# Patient Record
Sex: FEMALE | Race: WHITE | Employment: OTHER | URBAN - METROPOLITAN AREA
[De-identification: names, ages, dates, MRNs, and addresses within clinical notes are randomized per-mention and may not be internally consistent; named-entity substitution may affect disease eponyms.]

---

## 2017-01-27 ENCOUNTER — HOSPITAL ENCOUNTER (OUTPATIENT)
Dept: WOMENS IMAGING | Age: 82
Discharge: HOME | End: 2017-01-27
Attending: FAMILY MEDICINE | Admitting: FAMILY MEDICINE

## 2017-01-27 DIAGNOSIS — Z12.31 VISIT FOR SCREENING MAMMOGRAM: ICD-10-CM

## 2018-01-30 ENCOUNTER — HOSPITAL ENCOUNTER (OUTPATIENT)
Dept: WOMENS IMAGING | Age: 83
Discharge: HOME | End: 2018-01-30
Attending: FAMILY MEDICINE | Admitting: FAMILY MEDICINE

## 2018-01-30 DIAGNOSIS — Z12.31 VISIT FOR SCREENING MAMMOGRAM: ICD-10-CM

## 2018-02-02 ENCOUNTER — HOSPITAL ENCOUNTER (OUTPATIENT)
Dept: WOMENS IMAGING | Age: 83
Discharge: HOME | End: 2018-02-02
Attending: FAMILY MEDICINE | Admitting: FAMILY MEDICINE

## 2018-02-02 DIAGNOSIS — R92.8 ABNORMAL MAMMOGRAM: ICD-10-CM

## 2018-02-02 DIAGNOSIS — R92.8 OTHER ABNORMAL AND INCONCLUSIVE FINDINGS ON DIAGNOSTIC IMAGING OF BREAST: ICD-10-CM

## 2018-02-02 NOTE — PLAN OF CARE
Dr. Helen العلي spoke to patient regarding recommendation for breast biopsy. RN and patient discussed medical history, allergies, and current medication list. Biopsy procedure explained to patient and printed education and instructions were provided as well. Patient denies any further questions at this time. Doctor's office was called and report was faxed. Requesting order for biopsy from MD at this time.

## 2018-02-09 ENCOUNTER — HOSPITAL ENCOUNTER (OUTPATIENT)
Dept: WOMENS IMAGING | Age: 83
Discharge: HOME | End: 2018-02-09
Attending: FAMILY MEDICINE | Admitting: FAMILY MEDICINE

## 2018-02-09 VITALS
DIASTOLIC BLOOD PRESSURE: 53 MMHG | OXYGEN SATURATION: 96 % | SYSTOLIC BLOOD PRESSURE: 122 MMHG | RESPIRATION RATE: 18 BRPM | HEART RATE: 81 BPM

## 2018-02-09 DIAGNOSIS — R92.8 ABNORMAL MAMMOGRAM: ICD-10-CM

## 2018-02-09 DIAGNOSIS — R92.8 OTHER ABNORMAL AND INCONCLUSIVE FINDINGS ON DIAGNOSTIC IMAGING OF BREAST: ICD-10-CM

## 2018-02-13 ENCOUNTER — TELEPHONE (OUTPATIENT)
Dept: WOMENS IMAGING | Age: 83
End: 2018-02-13

## 2018-02-13 NOTE — TELEPHONE ENCOUNTER
Spoke to [place] to give her the breast biopsy results. An appt has been set up for her to see Dr. Bella Magana tomorrow, 2/14 at 10:30. Her  will go with her. She states her breast is a little tender and bruised but otherwise she feels fine. Dr. Bernstein Ree aware of path results and will make his addendum when he returns. Will send results to PCP now.

## 2018-02-14 ENCOUNTER — OFFICE VISIT (OUTPATIENT)
Dept: SURGERY | Age: 83
End: 2018-02-14

## 2018-02-14 VITALS — DIASTOLIC BLOOD PRESSURE: 83 MMHG | SYSTOLIC BLOOD PRESSURE: 137 MMHG | HEART RATE: 104 BPM

## 2018-02-14 DIAGNOSIS — Z17.0 MALIGNANT NEOPLASM OF UPPER-OUTER QUADRANT OF LEFT BREAST IN FEMALE, ESTROGEN RECEPTOR POSITIVE (HCC): Primary | ICD-10-CM

## 2018-02-14 DIAGNOSIS — C50.412 MALIGNANT NEOPLASM OF UPPER-OUTER QUADRANT OF LEFT BREAST IN FEMALE, ESTROGEN RECEPTOR POSITIVE (HCC): Primary | ICD-10-CM

## 2018-02-14 PROCEDURE — 4040F PNEUMOC VAC/ADMIN/RCVD: CPT | Performed by: SURGERY

## 2018-02-14 PROCEDURE — G8419 CALC BMI OUT NRM PARAM NOF/U: HCPCS | Performed by: SURGERY

## 2018-02-14 PROCEDURE — 1036F TOBACCO NON-USER: CPT | Performed by: SURGERY

## 2018-02-14 PROCEDURE — 1123F ACP DISCUSS/DSCN MKR DOCD: CPT | Performed by: SURGERY

## 2018-02-14 PROCEDURE — G8400 PT W/DXA NO RESULTS DOC: HCPCS | Performed by: SURGERY

## 2018-02-14 PROCEDURE — 1090F PRES/ABSN URINE INCON ASSESS: CPT | Performed by: SURGERY

## 2018-02-14 PROCEDURE — 99204 OFFICE O/P NEW MOD 45 MIN: CPT | Performed by: SURGERY

## 2018-02-14 PROCEDURE — G8427 DOCREV CUR MEDS BY ELIG CLIN: HCPCS | Performed by: SURGERY

## 2018-02-14 PROCEDURE — G8484 FLU IMMUNIZE NO ADMIN: HCPCS | Performed by: SURGERY

## 2018-02-14 RX ORDER — VENLAFAXINE HYDROCHLORIDE 150 MG/1
150 CAPSULE, EXTENDED RELEASE ORAL EVERY EVENING
COMMUNITY

## 2018-02-14 RX ORDER — CYANOCOBALAMIN (VITAMIN B-12) 1000 MCG
1 TABLET, EXTENDED RELEASE ORAL 2 TIMES DAILY WITH MEALS
COMMUNITY
End: 2021-11-02

## 2018-02-14 RX ORDER — CLONAZEPAM 0.5 MG/1
0.5 TABLET ORAL 2 TIMES DAILY
COMMUNITY

## 2018-02-14 RX ORDER — MULTIVIT WITH MINERALS/LUTEIN
250 TABLET ORAL 3 TIMES DAILY
COMMUNITY
End: 2021-11-02

## 2018-02-14 RX ORDER — ALENDRONATE SODIUM 70 MG/1
70 TABLET ORAL
COMMUNITY

## 2018-02-14 NOTE — PATIENT INSTRUCTIONS
Testing results were reviewed with patient and  today. Exam done today in office was unremarkable. Surgical options were reviewed in office (mastectomy vs lumpectomy)  Will need consultation with oncology (Dr Ruiz Snyder) to discuss further treatment (radiation). Risks and benefits of surgery were discussed  Outpatient procedure with general anesthesia. Will need to schedule H&P with PCP for surgical clearance.   Discuss options and return to office with decision

## 2018-02-14 NOTE — PROGRESS NOTES
regular rhythm, normal S1, S2, no murmurs, rubs, clicks or gallops  Abdomen: soft, non-tender, non-distended, no masses or organomegaly  Extremities: bilateral upper extremities without edema      Imaging: all imaging pertinent to this visit/exam was reviewed      Assessment:  1. Malignant neoplasm of upper-outer quadrant of left breast in female, estrogen receptor positive ([address]         Plan:  1. Discussed breast biopsy results with patient. Reviewed surgical options, including lumpectomy, mastectomy, and mastectomy with reconstruction. Also reviewed the sentinal node procedure. Recommendations were made following standard NCCN guidelines. She wishes to think about her options and I will see her back next week for further discussion.

## 2018-02-21 ENCOUNTER — OFFICE VISIT (OUTPATIENT)
Dept: SURGERY | Age: 83
End: 2018-02-21

## 2018-02-21 VITALS — SYSTOLIC BLOOD PRESSURE: 132 MMHG | HEART RATE: 96 BPM | DIASTOLIC BLOOD PRESSURE: 78 MMHG

## 2018-02-21 DIAGNOSIS — C50.412 MALIGNANT NEOPLASM OF UPPER-OUTER QUADRANT OF LEFT BREAST IN FEMALE, ESTROGEN RECEPTOR POSITIVE (HCC): Primary | ICD-10-CM

## 2018-02-21 DIAGNOSIS — Z17.0 MALIGNANT NEOPLASM OF UPPER-OUTER QUADRANT OF LEFT BREAST IN FEMALE, ESTROGEN RECEPTOR POSITIVE (HCC): Primary | ICD-10-CM

## 2018-02-21 PROCEDURE — 4040F PNEUMOC VAC/ADMIN/RCVD: CPT | Performed by: SURGERY

## 2018-02-21 PROCEDURE — G8484 FLU IMMUNIZE NO ADMIN: HCPCS | Performed by: SURGERY

## 2018-02-21 PROCEDURE — 99213 OFFICE O/P EST LOW 20 MIN: CPT | Performed by: SURGERY

## 2018-02-21 PROCEDURE — 1123F ACP DISCUSS/DSCN MKR DOCD: CPT | Performed by: SURGERY

## 2018-02-21 PROCEDURE — 1036F TOBACCO NON-USER: CPT | Performed by: SURGERY

## 2018-02-21 PROCEDURE — 1090F PRES/ABSN URINE INCON ASSESS: CPT | Performed by: SURGERY

## 2018-02-21 PROCEDURE — G8419 CALC BMI OUT NRM PARAM NOF/U: HCPCS | Performed by: SURGERY

## 2018-02-21 PROCEDURE — G8400 PT W/DXA NO RESULTS DOC: HCPCS | Performed by: SURGERY

## 2018-02-21 PROCEDURE — G8427 DOCREV CUR MEDS BY ELIG CLIN: HCPCS | Performed by: SURGERY

## 2018-02-21 NOTE — LETTER
CONSENT TO OPERATION  AND/OR OTHER PROCEDURE(S)          PATIENT : Leanne Bajwa   YOB: 1935      DATE : 2/21/18          1. I request and consent that Dr. Nikhil Nichole,  and/or his associates or assistants perform an operation and/or procedures on the above patient at  [facility] [place], to treat the condition(s) which appear indicated by the diagnostic studies already performed. I have been told that in general terms the nature, purpose and reasonable expectations of the operation and/or procedure(s) are:     Left Breast Needle Localized Lumpectomy X 2 with Left Axillary [place] Node Biopsy with Radio Isotope with Gamma Probe       2. It has been explained to me by the informing physician that during the course of the operation and/or procedure(s) unforeseen conditions may be revealed that necessitate an extension of the original operation and/or procedure(s) or different operation and/or procedures than those set forth in Paragraph 1. I therefore authorize and request that my physician and/or his associates or assistants perform such operations and/or procedures as are necessary and desirable in the exercise of professional judgment. The authority granted under this Paragraph 2 shall extend to all conditions that require treatment and are known to my physician at the time the operation is commenced. 3. I have been made aware by the informing physician of certain risks and consequences that are associated with the operation and/or procedure(s) described in Paragraph 1, the reasonable alternative methods or treatment, the possible consequences, the possibility that the operation and/or procedure(s) may be unsuccessful and the possibility of complications. I understand the reasonably known risks to be:  ? Bleeding  ? Infection  ? Poor Healing  ? Possible Damage to Nerve, Vessel, Tendon/Muscle or Bone  ? Need for further Treatment/Surgery (re excision)  ? Stiffness  ?  Pain ? Residual or Recurrent Symptoms  ? Anesthetic and/or Medical Risks  ? 4. I have also been informed by the informing physician that there are other risks from both known and unknown causes that are attendant to the performance of any surgical procedure. I am aware that the practice of medicine and surgery is not an exact science, and that no guarantees have been made to me concerning the results of the operation and/or procedure(s). 5. I   CONSENT / REFUSE CONSENT  (strike the phrase that does not apply) to the taking of photographs before, during and/or after the operation or procedure for scientific/educational purposes. 6. I consent to the administration of anesthesia and to the use of such anesthetics as may be deemed advisable by the anesthesiologist who has been engaged by me or my physician. 7. I certify that I have read and understand the above consent to operation and/or other procedure(s); that the explanations therein referred to were made to me by the informing physician in advance of my signing this consent; that all blanks or statements requiring insertion or completion were filled in and inapplicable paragraphs, if any, were stricken before I signed; and that all questions asked by me about the operation and/or procedure(s) which I have consented to have been fully answered in a satisfactory manner.               _______________________  Witness        Signature Of Patient          _______________________  Informing Physician         Signature of Informing Physician           If patient is unable to sign or is a minor, complete one of the following:    (A)  Patient is a minor   years of age. (B)  Patient is unable to sign because: The undersigned represents that he or she is duly authorized to execute this consent for and on behalf of the above named patient.                Witness               o  Parent  o  Guardian   o  Spouse       o  Other (specify)

## 2018-02-21 NOTE — PROGRESS NOTES
Patient Name: Janna Hinojosa  YOB: 1935  Primary Care Physician: Martina Avelar MD    Chief Complaint:    Chief Complaint   Patient presents with   [facility] Surgical Consult     Patient is here today to discuss surgical details regarding mastectomy vs lumpectomy    Pt had a recent mammogram showing 2 areas of microcalcification in the upper outer left breast. The more lateral area is 1.1 cm, 2:00, 5 cm from nipple. The biopsy showed DCIS with microinvasion. 3.5 cm medial to this is a second area 3 mm, 2-3:00, 2.7 cm from nipple. This showed DCIS. Both ER/GA positive. She has not felt any masses. No breast pain or nipple discharge. No family history of breast cancer. No history of HRT. Here to discuss surgery.  and daughter present. Breast History:  History of Previous Breast Biopsy:no  Self Breast Exams Completed:no-  Family History of Breast Cancer:no-  Family History of Other Cancers:yes-mother rectal cancer age 80  [employer] Decent:no  Bra Size: 39 C    Gyne History:  : 2, Para: 2,   Age of Menarche: 17 years  Age of Menopause: 42 years   LMP: 42  Age of first pregnancy: 42 years  Age of first live Birth: 42 years  Breast Feeding (total time): no  History of Hysterectomy / JAMIL-BSO: no  History of OCP's/HRT:no  History of Ovarian Cancer: no  Family History of Ovarian Cancer: no  History of Gyne Surgery: yes-tubal ligation    Past Medical History:   Diagnosis Date    Asthma     GERD (gastroesophageal reflux disease)        Past Surgical History:   Procedure Laterality Date     SECTION            Current Outpatient Prescriptions   Medication Sig Dispense Refill    alendronate (FOSAMAX) 70 MG tablet Take 70 mg by mouth every 7 days      clonazePAM (KLONOPIN) 0.5 MG tablet Take 0.5 mg by mouth 2 times daily as needed.       RaNITidine HCl (RANITIDINE 75 PO) Take by mouth      venlafaxine (EFFEXOR XR) 150 MG extended release capsule Take 150 mg by mouth daily ([facility]         Plan:  1. She wishes to proceed with needle localized left breast lumpectomy x 2 for clinical stage 1 (TmiN0) ductal carcinoma with left axillary sentinel node biopsy. Risks, benefits, and alternatives were reviewed with the patient. Questions were answered, and she is agreeable to proceed. Patient was seen with total face to face time of 22 minutes. More than 50% of this visit was counseling and education regarding future surgery.

## 2018-03-08 ENCOUNTER — TELEPHONE (OUTPATIENT)
Dept: SURGERY | Age: 83
End: 2018-03-08

## 2018-03-13 ENCOUNTER — SURG/PROC ORDERS (OUTPATIENT)
Dept: ANESTHESIOLOGY | Age: 83
End: 2018-03-13

## 2018-03-13 ENCOUNTER — PAT TELEPHONE (OUTPATIENT)
Dept: PREADMISSION TESTING | Age: 83
End: 2018-03-13

## 2018-03-13 VITALS — BODY MASS INDEX: 29.88 KG/M2 | WEIGHT: 175 LBS | HEIGHT: 64 IN

## 2018-03-13 RX ORDER — SODIUM CHLORIDE 9 MG/ML
INJECTION, SOLUTION INTRAVENOUS CONTINUOUS
Status: CANCELLED | OUTPATIENT
Start: 2018-03-13

## 2018-03-13 RX ORDER — SODIUM CHLORIDE 0.9 % (FLUSH) 0.9 %
10 SYRINGE (ML) INJECTION EVERY 12 HOURS SCHEDULED
Status: CANCELLED | OUTPATIENT
Start: 2018-03-13

## 2018-03-13 RX ORDER — SODIUM CHLORIDE 0.9 % (FLUSH) 0.9 %
10 SYRINGE (ML) INJECTION PRN
Status: CANCELLED | OUTPATIENT
Start: 2018-03-13

## 2018-03-13 RX ORDER — BISMUTH SUBSALICYLATE 262 MG/1
262 TABLET, CHEWABLE ORAL 4 TIMES DAILY PRN
COMMUNITY
Start: 2018-01-21 | End: 2021-11-02

## 2018-03-13 RX ORDER — TRIAMTERENE AND HYDROCHLOROTHIAZIDE 75; 50 MG/1; MG/1
TABLET ORAL
COMMUNITY
Start: 2018-03-02 | End: 2021-11-02

## 2018-03-13 RX ORDER — ALBUTEROL SULFATE 90 UG/1
2 AEROSOL, METERED RESPIRATORY (INHALATION) EVERY 6 HOURS PRN
COMMUNITY

## 2018-03-13 NOTE — PRE-PROCEDURE INSTRUCTIONS
at the hospital until the child is discharged. Please do not bring other children with you. For your comfort, please wear simple loose fitting clothing to the hospital.  Please do not bring valuables. Do not wear any make-up or nail polish on your fingers or toes      For your safety, please do not wear any jewelry or body piercing's on the day of surgery. All jewelry must be removed. If you have dentures, they will be removed before going to operating room. For your convenience, we will provide you with a container. If you wear contact lenses or glasses, they will be removed, please bring a case for them. If you have a living will and a durable power of  for healthcare, please bring in a copy. As part of our patient safety program to minimize surgical site infections, we ask you to do the following:    · Please notify your surgeon if you develop any illness between         now and the  day of your surgery. · This includes a cough, cold, fever, sore throat, nausea,         or vomiting, and diarrhea, etc.  ·  Please notify your surgeon if you experience dizziness, shortness         of breath or blurred vision between now and the time of your surgery. Do not shave your operative site 96 hours prior to surgery. For face and neck surgery, men may use an electric razor 48 hours   prior to surgery. You may shower the night before surgery or the morning of   your surgery with an antibacterial soap. You will need to bring a photo ID and insurance card    [facility] has an onsite pharmacy, would you like to utilize our pharmacy     If you will be staying overnight and use a C-pap machine, please bring   your C-pap to hospital     Our goal is to provide you with excellent care, therefore, visitors will be limited to two(2) in the room at a time so that we may focus on providing this care for you.           Please contact pre-admission testing if you have any further

## 2018-03-15 ENCOUNTER — HOSPITAL ENCOUNTER (OUTPATIENT)
Dept: SURGERY | Age: 83
Discharge: HOME | End: 2018-03-15
Admitting: SURGERY

## 2018-03-15 VITALS
DIASTOLIC BLOOD PRESSURE: 78 MMHG | RESPIRATION RATE: 18 BRPM | HEART RATE: 85 BPM | SYSTOLIC BLOOD PRESSURE: 145 MMHG | WEIGHT: 175 LBS | BODY MASS INDEX: 29.88 KG/M2 | TEMPERATURE: 98.5 F | HEIGHT: 64 IN | OXYGEN SATURATION: 93 %

## 2018-03-15 DIAGNOSIS — R92.8 ABNORMAL MAMMOGRAM: ICD-10-CM

## 2018-03-15 DIAGNOSIS — C50.412 MALIGNANT NEOPLASM OF UPPER-OUTER QUADRANT OF LEFT BREAST IN FEMALE, ESTROGEN RECEPTOR POSITIVE (HCC): ICD-10-CM

## 2018-03-15 DIAGNOSIS — Z17.0 MALIGNANT NEOPLASM OF UPPER-OUTER QUADRANT OF LEFT BREAST IN FEMALE, ESTROGEN RECEPTOR POSITIVE (HCC): ICD-10-CM

## 2018-03-15 LAB
ANION GAP SERPL CALCULATED.3IONS-SCNC: 10 MMOL/L (ref 3–16)
BUN BLDV-MCNC: 30 MG/DL (ref 7–20)
CALCIUM SERPL-MCNC: 9.8 MG/DL (ref 8.3–10.6)
CHLORIDE BLD-SCNC: 104 MMOL/L (ref 99–110)
CO2: 28 MMOL/L (ref 21–32)
CREAT SERPL-MCNC: 0.9 MG/DL (ref 0.6–1.2)
GFR AFRICAN AMERICAN: >60
GFR NON-AFRICAN AMERICAN: >60
GLUCOSE BLD-MCNC: 98 MG/DL (ref 70–99)
HCT VFR BLD CALC: 38.7 % (ref 36–48)
HEMOGLOBIN: 12.8 G/DL (ref 12–16)
MCH RBC QN AUTO: 31.5 PG (ref 26–34)
MCHC RBC AUTO-ENTMCNC: 33.2 G/DL (ref 31–36)
MCV RBC AUTO: 94.7 FL (ref 80–100)
PDW BLD-RTO: 13.2 % (ref 12.4–15.4)
PLATELET # BLD: 204 K/UL (ref 135–450)
PMV BLD AUTO: 8.3 FL (ref 5–10.5)
POTASSIUM REFLEX MAGNESIUM: 4.3 MMOL/L (ref 3.5–5.1)
RBC # BLD: 4.08 M/UL (ref 4–5.2)
SODIUM BLD-SCNC: 142 MMOL/L (ref 136–145)
WBC # BLD: 6.7 K/UL (ref 4–11)

## 2018-03-15 PROCEDURE — 38525 BIOPSY/REMOVAL LYMPH NODES: CPT | Performed by: SURGERY

## 2018-03-15 PROCEDURE — 14001 TIS TRNFR TRUNK 10.1-30SQCM: CPT | Performed by: SURGERY

## 2018-03-15 PROCEDURE — 38900 IO MAP OF SENT LYMPH NODE: CPT | Performed by: SURGERY

## 2018-03-15 PROCEDURE — 19301 PARTIAL MASTECTOMY: CPT | Performed by: SURGERY

## 2018-03-15 RX ORDER — ONDANSETRON 2 MG/ML
4 INJECTION INTRAMUSCULAR; INTRAVENOUS
Status: ACTIVE | OUTPATIENT
Start: 2018-03-15 | End: 2018-03-15

## 2018-03-15 RX ORDER — OXYCODONE HYDROCHLORIDE AND ACETAMINOPHEN 5; 325 MG/1; MG/1
2 TABLET ORAL PRN
Status: ACTIVE | OUTPATIENT
Start: 2018-03-15 | End: 2018-03-15

## 2018-03-15 RX ORDER — SODIUM CHLORIDE 9 MG/ML
INJECTION, SOLUTION INTRAVENOUS CONTINUOUS
Status: DISCONTINUED | OUTPATIENT
Start: 2018-03-15 | End: 2018-03-16 | Stop reason: HOSPADM

## 2018-03-15 RX ORDER — MORPHINE SULFATE 2 MG/ML
2 INJECTION, SOLUTION INTRAMUSCULAR; INTRAVENOUS EVERY 5 MIN PRN
Status: DISCONTINUED | OUTPATIENT
Start: 2018-03-15 | End: 2018-03-16 | Stop reason: HOSPADM

## 2018-03-15 RX ORDER — FENTANYL CITRATE 50 UG/ML
50 INJECTION, SOLUTION INTRAMUSCULAR; INTRAVENOUS EVERY 5 MIN PRN
Status: DISCONTINUED | OUTPATIENT
Start: 2018-03-15 | End: 2018-03-16 | Stop reason: HOSPADM

## 2018-03-15 RX ORDER — MORPHINE SULFATE 2 MG/ML
1 INJECTION, SOLUTION INTRAMUSCULAR; INTRAVENOUS EVERY 5 MIN PRN
Status: DISCONTINUED | OUTPATIENT
Start: 2018-03-15 | End: 2018-03-16 | Stop reason: HOSPADM

## 2018-03-15 RX ORDER — ACETAMINOPHEN 325 MG/1
650 TABLET ORAL
Status: COMPLETED | OUTPATIENT
Start: 2018-03-15 | End: 2018-03-15

## 2018-03-15 RX ORDER — MEPERIDINE HYDROCHLORIDE 25 MG/ML
12.5 INJECTION INTRAMUSCULAR; INTRAVENOUS; SUBCUTANEOUS EVERY 5 MIN PRN
Status: DISCONTINUED | OUTPATIENT
Start: 2018-03-15 | End: 2018-03-16 | Stop reason: HOSPADM

## 2018-03-15 RX ORDER — OXYCODONE HYDROCHLORIDE AND ACETAMINOPHEN 5; 325 MG/1; MG/1
1 TABLET ORAL PRN
Status: ACTIVE | OUTPATIENT
Start: 2018-03-15 | End: 2018-03-15

## 2018-03-15 RX ORDER — HYDROCODONE BITARTRATE AND ACETAMINOPHEN 5; 325 MG/1; MG/1
1 TABLET ORAL EVERY 6 HOURS PRN
Qty: 8 TABLET | Refills: 0 | Status: SHIPPED | OUTPATIENT
Start: 2018-03-15 | End: 2018-03-22

## 2018-03-15 RX ORDER — SODIUM CHLORIDE 0.9 % (FLUSH) 0.9 %
10 SYRINGE (ML) INJECTION PRN
Status: DISCONTINUED | OUTPATIENT
Start: 2018-03-15 | End: 2018-03-16 | Stop reason: HOSPADM

## 2018-03-15 RX ORDER — SODIUM CHLORIDE 0.9 % (FLUSH) 0.9 %
10 SYRINGE (ML) INJECTION EVERY 12 HOURS SCHEDULED
Status: DISCONTINUED | OUTPATIENT
Start: 2018-03-15 | End: 2018-03-16 | Stop reason: HOSPADM

## 2018-03-15 RX ORDER — LIDOCAINE AND PRILOCAINE 25; 25 MG/G; MG/G
CREAM TOPICAL ONCE
Status: COMPLETED | OUTPATIENT
Start: 2018-03-15 | End: 2018-03-15

## 2018-03-15 RX ORDER — FENTANYL CITRATE 50 UG/ML
25 INJECTION, SOLUTION INTRAMUSCULAR; INTRAVENOUS EVERY 5 MIN PRN
Status: DISCONTINUED | OUTPATIENT
Start: 2018-03-15 | End: 2018-03-16 | Stop reason: HOSPADM

## 2018-03-15 RX ADMIN — LIDOCAINE AND PRILOCAINE: 25; 25 CREAM TOPICAL at 07:27

## 2018-03-15 RX ADMIN — ACETAMINOPHEN 650 MG: 325 TABLET ORAL at 09:26

## 2018-03-15 RX ADMIN — SODIUM CHLORIDE: 9 INJECTION, SOLUTION INTRAVENOUS at 09:25

## 2018-03-15 ASSESSMENT — LIFESTYLE VARIABLES: SMOKING_STATUS: 0

## 2018-03-15 ASSESSMENT — PAIN - FUNCTIONAL ASSESSMENT: PAIN_FUNCTIONAL_ASSESSMENT: 0-10

## 2018-03-15 ASSESSMENT — PAIN SCALES - GENERAL
PAINLEVEL_OUTOF10: 0
PAINLEVEL_OUTOF10: 4
PAINLEVEL_OUTOF10: 0

## 2018-03-15 ASSESSMENT — PAIN DESCRIPTION - ONSET: ONSET: ON-GOING

## 2018-03-15 ASSESSMENT — ENCOUNTER SYMPTOMS: SHORTNESS OF BREATH: 1

## 2018-03-15 ASSESSMENT — PAIN DESCRIPTION - PROGRESSION: CLINICAL_PROGRESSION: NOT CHANGED

## 2018-03-15 ASSESSMENT — PAIN DESCRIPTION - ORIENTATION: ORIENTATION: LEFT

## 2018-03-15 ASSESSMENT — PAIN DESCRIPTION - PAIN TYPE: TYPE: SURGICAL PAIN

## 2018-03-15 ASSESSMENT — PAIN DESCRIPTION - DESCRIPTORS: DESCRIPTORS: ACHING

## 2018-03-15 ASSESSMENT — PAIN DESCRIPTION - FREQUENCY: FREQUENCY: INTERMITTENT

## 2018-03-15 ASSESSMENT — PAIN DESCRIPTION - LOCATION: LOCATION: BREAST

## 2018-03-15 NOTE — ANESTHESIA POST-OP
[facility] Department of Anesthesiology  Post-Anesthesia Note       Name:  Ajit Alejandro                                         Age:  [de-identified] y.o.   MRN:  [id number]     Last Vitals & Oxygen Saturation: BP (!) 145/78   Pulse 85   Temp 98.5 °F (36.9 °C) (Temporal)   Resp 18   Ht 5' 4\" (1.626 m)   Wt 175 lb (79.4 kg)   SpO2 93%   BMI 30.04 kg/m²   Patient Vitals for the past 4 hrs:   BP Temp Temp src Pulse Resp SpO2   03/15/18 1300 (!) 145/78 - - 85 18 93 %   03/15/18 1225 135/67 - - 82 20 93 %   03/15/18 1217 (!) 140/73 - - 88 23 93 %   03/15/18 1202 137/71 - - 79 24 91 %   03/15/18 1158 - - - 81 - -   03/15/18 1152 130/72 - - 80 22 93 %   03/15/18 1148 - - - 77 20 93 %   03/15/18 1147 129/67 - - 78 21 95 %   03/15/18 1142 136/62 - - 75 20 98 %   03/15/18 1137 139/68 - - 76 21 99 %   03/15/18 1136 (!) 141/73 98.5 °F (36.9 °C) Temporal 77 20 99 %       Level of consciousness: awake, alert and oriented    Respiratory: stable     Cardiovascular: stable     Hydration: stable     PONV: stable     Post-op pain: adequate analgesia    Post-op assessment: no apparent anesthetic complications and tolerated procedure well    Complications:  none    Paulina Christiansen MD  March 15, 2018   1:21 PM

## 2018-03-15 NOTE — H&P
H&P Update    Patient's History and Physical from February 28, 2018 was reviewed. Patient examined. There has been no change.     Kaylan Whelan

## 2018-03-19 ENCOUNTER — TELEPHONE (OUTPATIENT)
Dept: SURGERY | Age: 83
End: 2018-03-19

## 2018-03-19 NOTE — TELEPHONE ENCOUNTER
Patient was given her pathology results and she scheduled post op visit for next week. She was given Dr Ad Fletcher name and phone number to schedule appointment.

## 2018-03-28 ENCOUNTER — OFFICE VISIT (OUTPATIENT)
Dept: SURGERY | Age: 83
End: 2018-03-28

## 2018-03-28 VITALS
WEIGHT: 175 LBS | DIASTOLIC BLOOD PRESSURE: 77 MMHG | BODY MASS INDEX: 30.04 KG/M2 | HEART RATE: 84 BPM | SYSTOLIC BLOOD PRESSURE: 135 MMHG

## 2018-03-28 DIAGNOSIS — C50.412 MALIGNANT NEOPLASM OF UPPER-OUTER QUADRANT OF LEFT BREAST IN FEMALE, ESTROGEN RECEPTOR POSITIVE (HCC): Primary | ICD-10-CM

## 2018-03-28 DIAGNOSIS — Z17.0 MALIGNANT NEOPLASM OF UPPER-OUTER QUADRANT OF LEFT BREAST IN FEMALE, ESTROGEN RECEPTOR POSITIVE (HCC): Primary | ICD-10-CM

## 2018-03-28 PROCEDURE — 99024 POSTOP FOLLOW-UP VISIT: CPT | Performed by: SURGERY

## 2018-03-28 NOTE — LETTER
[address] [facility] Vascular Surgery  [address]  Phone: [phone]  Fax: [phone]    Deisy Cochran MD        March 28, 2018     Sushil oRwe MD  [address] [facility] [address]  [address]    Patient: Tyrel Dekcer  MR Number: [id number]  YOB: 1935  Date of Visit: 3/28/2018    Dear Liana Shoemaker:    Freddie Baker returned today following her lumpectomy and sentinel node biopsy. 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 Microinvasion had been noted on prior bx. TisN0. ER/[place] positive. Wounds healing well. Some axillary discomfort. To see oncology tomorrow. f/u with me in 6 months with left breast diagnostic mammogram.      If you have questions, please do not hesitate to call me. I look forward to following Michelle Bruno along with you.     Sincerely,          Deisy Cochran MD

## 2018-04-11 ENCOUNTER — HOSPITAL ENCOUNTER (OUTPATIENT)
Dept: WOMENS IMAGING | Age: 83
Discharge: HOME | End: 2018-04-11
Attending: INTERNAL MEDICINE | Admitting: INTERNAL MEDICINE

## 2018-04-11 DIAGNOSIS — M81.0 AGE-RELATED OSTEOPOROSIS WITHOUT CURRENT PATHOLOGICAL FRACTURE: ICD-10-CM

## 2018-04-11 DIAGNOSIS — Z78.0 ASYMPTOMATIC AGE-RELATED POSTMENOPAUSAL STATE: ICD-10-CM

## 2018-08-14 ENCOUNTER — HOSPITAL ENCOUNTER (OUTPATIENT)
Dept: WOMENS IMAGING | Age: 83
Discharge: HOME | End: 2018-08-14
Attending: SURGERY | Admitting: SURGERY

## 2018-08-14 DIAGNOSIS — C50.412 MALIGNANT NEOPLASM OF UPPER-OUTER QUADRANT OF LEFT BREAST IN FEMALE, ESTROGEN RECEPTOR POSITIVE (HCC): ICD-10-CM

## 2018-08-14 DIAGNOSIS — Z17.0 MALIGNANT NEOPLASM OF UPPER-OUTER QUADRANT OF LEFT BREAST IN FEMALE, ESTROGEN RECEPTOR POSITIVE (HCC): ICD-10-CM

## 2018-08-14 DIAGNOSIS — C50.412 MALIGNANT NEOPLASM OF UPPER-OUTER QUADRANT OF LEFT FEMALE BREAST (HCC): ICD-10-CM

## 2018-08-22 ENCOUNTER — OFFICE VISIT (OUTPATIENT)
Dept: BREAST CENTER | Age: 83
End: 2018-08-22

## 2018-08-22 VITALS
BODY MASS INDEX: 30.04 KG/M2 | DIASTOLIC BLOOD PRESSURE: 65 MMHG | WEIGHT: 175 LBS | SYSTOLIC BLOOD PRESSURE: 148 MMHG | HEART RATE: 87 BPM

## 2018-08-22 DIAGNOSIS — Z17.0 MALIGNANT NEOPLASM OF UPPER-OUTER QUADRANT OF LEFT BREAST IN FEMALE, ESTROGEN RECEPTOR POSITIVE (HCC): Primary | ICD-10-CM

## 2018-08-22 DIAGNOSIS — C50.412 MALIGNANT NEOPLASM OF UPPER-OUTER QUADRANT OF LEFT BREAST IN FEMALE, ESTROGEN RECEPTOR POSITIVE (HCC): Primary | ICD-10-CM

## 2018-08-22 PROCEDURE — 1123F ACP DISCUSS/DSCN MKR DOCD: CPT | Performed by: SURGERY

## 2018-08-22 PROCEDURE — 1036F TOBACCO NON-USER: CPT | Performed by: SURGERY

## 2018-08-22 PROCEDURE — 1090F PRES/ABSN URINE INCON ASSESS: CPT | Performed by: SURGERY

## 2018-08-22 PROCEDURE — G8417 CALC BMI ABV UP PARAM F/U: HCPCS | Performed by: SURGERY

## 2018-08-22 PROCEDURE — G8427 DOCREV CUR MEDS BY ELIG CLIN: HCPCS | Performed by: SURGERY

## 2018-08-22 PROCEDURE — 99213 OFFICE O/P EST LOW 20 MIN: CPT | Performed by: SURGERY

## 2018-08-22 PROCEDURE — 4040F PNEUMOC VAC/ADMIN/RCVD: CPT | Performed by: SURGERY

## 2018-08-22 PROCEDURE — G8399 PT W/DXA RESULTS DOCUMENT: HCPCS | Performed by: SURGERY

## 2018-08-22 PROCEDURE — 1101F PT FALLS ASSESS-DOCD LE1/YR: CPT | Performed by: SURGERY

## 2018-08-22 RX ORDER — ANASTROZOLE 1 MG/1
1 TABLET ORAL EVERY EVENING
COMMUNITY

## 2018-08-22 ASSESSMENT — ENCOUNTER SYMPTOMS
BACK PAIN: 0
CONSTIPATION: 0
COLOR CHANGE: 0
DIARRHEA: 0
TROUBLE SWALLOWING: 0
NAUSEA: 0
SHORTNESS OF BREATH: 0
RECTAL PAIN: 0
BLOOD IN STOOL: 0
ABDOMINAL DISTENTION: 0
VOMITING: 0
ABDOMINAL PAIN: 0
COUGH: 0

## 2018-08-22 NOTE — PROGRESS NOTES
Subjective:      Patient ID: Stephenie Torres is a [de-identified] y.o. female. HPI   Chief Complaint   Patient presents with    6 Month Follow-Up     Patient presents 6 month follow up with mammogram       S/p left lumpectomy, snbx 3/2018. 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 Microinvasion had been noted on prior bx. TisN0. ER/[place] positive. Postop radiation. On Arimidex. Left mammogram 8/14/2018 showed post-therapy changes, probable benign. Recommend bilateral mammogram 2/2019. Review of Systems   Constitutional: Negative for activity change, appetite change, chills, fatigue, fever and unexpected weight change. HENT: Negative for trouble swallowing. Eyes: Negative for visual disturbance. Respiratory: Negative for cough and shortness of breath. Cardiovascular: Negative for chest pain and leg swelling. Gastrointestinal: Negative for abdominal distention, abdominal pain, blood in stool, constipation, diarrhea, nausea, rectal pain and vomiting. Genitourinary: Negative for difficulty urinating, dysuria, flank pain, frequency and hematuria. Musculoskeletal: Negative for arthralgias, back pain, neck pain and neck stiffness. Skin: Negative for color change, rash and wound. Neurological: Negative for dizziness, weakness, numbness and headaches. Hematological: Negative for adenopathy. Does not bruise/bleed easily. Psychiatric/Behavioral: Negative for confusion and sleep disturbance. All other systems reviewed and are negative. Objective:   Physical Exam   Constitutional: She is oriented to person, place, and time. She appears well-developed and well-nourished. No distress. HENT:   Head: Normocephalic and atraumatic. Neck: Normal range of motion. No tracheal deviation present. Cardiovascular: Normal rate. Pulmonary/Chest: Effort normal. No respiratory distress. She has no wheezes. Abdominal: Soft. She exhibits no distension. There is no hepatosplenomegaly.    Musculoskeletal:

## 2019-01-11 ENCOUNTER — APPOINTMENT (OUTPATIENT)
Dept: CT IMAGING | Age: 84
End: 2019-01-11
Payer: MEDICARE

## 2019-01-11 ENCOUNTER — APPOINTMENT (OUTPATIENT)
Dept: GENERAL RADIOLOGY | Age: 84
End: 2019-01-11
Payer: MEDICARE

## 2019-01-11 ENCOUNTER — HOSPITAL ENCOUNTER (EMERGENCY)
Age: 84
Discharge: HOME | End: 2019-01-11
Payer: MEDICARE

## 2019-01-11 VITALS
WEIGHT: 169.53 LBS | TEMPERATURE: 97.9 F | SYSTOLIC BLOOD PRESSURE: 133 MMHG | BODY MASS INDEX: 29.1 KG/M2 | HEART RATE: 73 BPM | OXYGEN SATURATION: 100 % | DIASTOLIC BLOOD PRESSURE: 79 MMHG | RESPIRATION RATE: 16 BRPM

## 2019-01-11 DIAGNOSIS — S70.01XA CONTUSION OF RIGHT HIP, INITIAL ENCOUNTER: ICD-10-CM

## 2019-01-11 DIAGNOSIS — S40.012A CONTUSION OF LEFT SHOULDER, INITIAL ENCOUNTER: ICD-10-CM
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
## 2019-01-11 DIAGNOSIS — S16.1XXA STRAIN OF NECK MUSCLE, INITIAL ENCOUNTER: ICD-10-CM

## 2019-01-11 PROCEDURE — 73502 X-RAY EXAM HIP UNI 2-3 VIEWS: CPT

## 2019-01-11 PROCEDURE — 70450 CT HEAD/BRAIN W/O DYE: CPT

## 2019-01-11 PROCEDURE — 73030 X-RAY EXAM OF SHOULDER: CPT

## 2019-01-11 PROCEDURE — 72125 CT NECK SPINE W/O DYE: CPT

## 2019-01-11 PROCEDURE — 6370000000 HC RX 637 (ALT 250 FOR IP): Performed by: PHYSICIAN ASSISTANT

## 2019-01-11 PROCEDURE — 99284 EMERGENCY DEPT VISIT MOD MDM: CPT

## 2019-01-11 RX ORDER — ACETAMINOPHEN 500 MG
1000 TABLET ORAL ONCE
Status: COMPLETED | OUTPATIENT
Start: 2019-01-11 | End: 2019-01-11

## 2019-01-11 RX ADMIN — ACETAMINOPHEN 1000 MG: 500 TABLET ORAL at 09:55

## 2019-01-11 ASSESSMENT — PAIN DESCRIPTION - PROGRESSION: CLINICAL_PROGRESSION: GRADUALLY WORSENING

## 2019-01-11 ASSESSMENT — ENCOUNTER SYMPTOMS
BACK PAIN: 0
SHORTNESS OF BREATH: 0
ABDOMINAL PAIN: 0
NAUSEA: 0

## 2019-01-11 ASSESSMENT — PAIN DESCRIPTION - DESCRIPTORS: DESCRIPTORS: ACHING

## 2019-01-11 ASSESSMENT — PAIN DESCRIPTION - ONSET: ONSET: GRADUAL

## 2019-01-11 ASSESSMENT — PAIN SCALES - GENERAL
PAINLEVEL_OUTOF10: 4
PAINLEVEL_OUTOF10: 4
PAINLEVEL_OUTOF10: 3

## 2019-01-11 ASSESSMENT — PAIN DESCRIPTION - LOCATION: LOCATION: SHOULDER;HIP

## 2019-01-11 ASSESSMENT — PAIN DESCRIPTION - FREQUENCY: FREQUENCY: CONTINUOUS

## 2019-02-25 ENCOUNTER — OFFICE VISIT (OUTPATIENT)
Dept: BREAST CENTER | Age: 84
End: 2019-02-25
Payer: MEDICARE

## 2019-02-25 ENCOUNTER — HOSPITAL ENCOUNTER (OUTPATIENT)
Dept: WOMENS IMAGING | Age: 84
Discharge: HOME | End: 2019-02-25
Payer: MEDICARE

## 2019-02-25 ENCOUNTER — HOSPITAL ENCOUNTER (OUTPATIENT)
Dept: ULTRASOUND IMAGING | Age: 84
Discharge: HOME | End: 2019-02-25
Payer: MEDICARE

## 2019-02-25 VITALS
DIASTOLIC BLOOD PRESSURE: 75 MMHG | BODY MASS INDEX: 30.04 KG/M2 | HEART RATE: 95 BPM | WEIGHT: 175 LBS | SYSTOLIC BLOOD PRESSURE: 153 MMHG

## 2019-02-25 DIAGNOSIS — Z17.0 MALIGNANT NEOPLASM OF UPPER-OUTER QUADRANT OF LEFT BREAST IN FEMALE, ESTROGEN RECEPTOR POSITIVE (HCC): ICD-10-CM

## 2019-02-25 DIAGNOSIS — Z17.0 MALIGNANT NEOPLASM OF UPPER-OUTER QUADRANT OF LEFT BREAST IN FEMALE, ESTROGEN RECEPTOR POSITIVE (HCC): Primary | ICD-10-CM

## 2019-02-25 DIAGNOSIS — C50.412 MALIGNANT NEOPLASM OF UPPER-OUTER QUADRANT OF LEFT BREAST IN FEMALE, ESTROGEN RECEPTOR POSITIVE (HCC): ICD-10-CM

## 2019-02-25 DIAGNOSIS — C50.412 MALIGNANT NEOPLASM OF UPPER-OUTER QUADRANT OF LEFT BREAST IN FEMALE, ESTROGEN RECEPTOR POSITIVE (HCC): Primary | ICD-10-CM

## 2019-02-25 DIAGNOSIS — N63.0 BREAST LUMP: ICD-10-CM

## 2019-02-25 DIAGNOSIS — Z90.12 STATUS POST PARTIAL MASTECTOMY OF LEFT BREAST: ICD-10-CM

## 2019-02-25 PROCEDURE — 76642 ULTRASOUND BREAST LIMITED: CPT

## 2019-02-25 PROCEDURE — 99213 OFFICE O/P EST LOW 20 MIN: CPT | Performed by: SURGERY

## 2019-02-25 PROCEDURE — G8399 PT W/DXA RESULTS DOCUMENT: HCPCS | Performed by: SURGERY

## 2019-02-25 PROCEDURE — G8427 DOCREV CUR MEDS BY ELIG CLIN: HCPCS | Performed by: SURGERY

## 2019-02-25 PROCEDURE — G0279 TOMOSYNTHESIS, MAMMO: HCPCS

## 2019-02-25 PROCEDURE — G8484 FLU IMMUNIZE NO ADMIN: HCPCS | Performed by: SURGERY

## 2019-02-25 PROCEDURE — 1036F TOBACCO NON-USER: CPT | Performed by: SURGERY

## 2019-02-25 PROCEDURE — G8417 CALC BMI ABV UP PARAM F/U: HCPCS | Performed by: SURGERY

## 2019-02-25 PROCEDURE — 1101F PT FALLS ASSESS-DOCD LE1/YR: CPT | Performed by: SURGERY

## 2019-02-25 PROCEDURE — 1090F PRES/ABSN URINE INCON ASSESS: CPT | Performed by: SURGERY

## 2019-02-25 PROCEDURE — 1123F ACP DISCUSS/DSCN MKR DOCD: CPT | Performed by: SURGERY

## 2019-02-25 PROCEDURE — 4040F PNEUMOC VAC/ADMIN/RCVD: CPT | Performed by: SURGERY

## 2020-03-27 ENCOUNTER — TELEPHONE (OUTPATIENT)
Dept: BREAST CENTER | Age: 85
End: 2020-03-27

## 2020-03-27 NOTE — TELEPHONE ENCOUNTER
Called patient left message on voice mail for patient to called back to reschedule with [place] only on Wednesday @ [facility] from 06/03/20 through 07/08/20

## 2020-05-19 ENCOUNTER — TELEPHONE (OUTPATIENT)
Dept: SURGERY | Age: 85
End: 2020-05-19

## 2020-05-19 NOTE — TELEPHONE ENCOUNTER
Left message on voice mail, rescheduled appointment from 07/01/20 to 07/08/20, 10:30 mammogram see practitioner at 11:30 due to Molly Pickards not in office on 07/01/20, Schedule has changed due to COVID-19.   At [facility]

## 2020-07-08 ENCOUNTER — OFFICE VISIT (OUTPATIENT)
Dept: BREAST CENTER | Age: 85
End: 2020-07-08
Payer: MEDICARE

## 2020-07-08 ENCOUNTER — HOSPITAL ENCOUNTER (OUTPATIENT)
Dept: WOMENS IMAGING | Age: 85
Discharge: HOME | End: 2020-07-08
Payer: MEDICARE

## 2020-07-08 VITALS — HEART RATE: 83 BPM | SYSTOLIC BLOOD PRESSURE: 128 MMHG | DIASTOLIC BLOOD PRESSURE: 70 MMHG

## 2020-07-08 PROCEDURE — G8399 PT W/DXA RESULTS DOCUMENT: HCPCS | Performed by: NURSE PRACTITIONER

## 2020-07-08 PROCEDURE — 1090F PRES/ABSN URINE INCON ASSESS: CPT | Performed by: NURSE PRACTITIONER

## 2020-07-08 PROCEDURE — 77067 SCR MAMMO BI INCL CAD: CPT

## 2020-07-08 PROCEDURE — 1123F ACP DISCUSS/DSCN MKR DOCD: CPT | Performed by: NURSE PRACTITIONER

## 2020-07-08 PROCEDURE — 99213 OFFICE O/P EST LOW 20 MIN: CPT | Performed by: NURSE PRACTITIONER

## 2020-07-08 PROCEDURE — 1036F TOBACCO NON-USER: CPT | Performed by: NURSE PRACTITIONER

## 2020-07-08 PROCEDURE — 4040F PNEUMOC VAC/ADMIN/RCVD: CPT | Performed by: NURSE PRACTITIONER

## 2020-07-08 PROCEDURE — G8421 BMI NOT CALCULATED: HCPCS | Performed by: NURSE PRACTITIONER

## 2020-07-08 PROCEDURE — G8427 DOCREV CUR MEDS BY ELIG CLIN: HCPCS | Performed by: NURSE PRACTITIONER

## 2020-07-08 NOTE — PROGRESS NOTES
Surgical Breast Oncology       CC: One year [id number] for breast check and mammogram     HPI: Karley Bar is a 82 y.o. woman here for annual follow up for breast check secondary to personal history of breast cancer. She is [facility]. 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 Microinvasion had been noted on prior bx. TisN0. ER/[place] positive. Postop radiation. On Arimidex and tolerating it well. She has no breast related concerns today. She states that she does not perform routine self breast evaluations and has not noticed any new abnormalities such as masses, skin changes, color changes,nipple discharge, or changes to the nipple-areolar complex. Mammogram today reveals no direct or indirect signs of malignancy. BI-RADS 2.           Past Medical History:   Diagnosis Date    Asthma     CKD (chronic kidney disease)     GERD (gastroesophageal reflux disease)     Neuropathy     Osteoporosis     Pernicious anemia     Scoliosis        Past Surgical History:   Procedure Laterality Date     SECTION       SECTION      x`s       Family History   Problem Relation Age of Onset    Cancer Mother     Heart Disease Father        Allergies as of 2020 - Review Complete 2020   Allergen Reaction Noted    Latex Hives and Itching 2015       Social History     Tobacco Use    Smoking status: Never Smoker    Smokeless tobacco: Never Used   Substance Use Topics    Alcohol use: No    Drug use: No       Current Outpatient Medications on File Prior to Visit   Medication Sig Dispense Refill    anastrozole (ARIMIDEX) 1 MG tablet Take 1 mg by mouth daily      albuterol sulfate  (90 Base) MCG/ACT inhaler Inhale 2 puffs into the lungs every 6 hours as needed for Wheezing      fluticasone-salmeterol (ADVAIR HFA) 115-21 MCG/ACT inhaler Inhale 2 puffs into the lungs 2 times daily      bismuth subsalicylate (PEPTO BISMOL) 262 MG chewable tablet 262 mg      triamterene-hydrochlorothiazide (MAXZIDE) 75-50 MG per tablet       Ergocalciferol (VITAMIN D2) 2000 units TABS Take 50,000 Units by mouth Twice weekly      alendronate (FOSAMAX) 70 MG tablet Take 70 mg by mouth every 7 days      clonazePAM (KLONOPIN) 0.5 MG tablet Take 0.5 mg by mouth 2 times daily as needed.  RaNITidine HCl (RANITIDINE 75 PO) Take by mouth      venlafaxine (EFFEXOR XR) 150 MG extended release capsule Take 150 mg by mouth daily      Ascorbic Acid (VITAMIN C) 250 MG tablet Take 250 mg by mouth daily      calcium citrate-vitamin D (CITRICAL + D) 315-250 MG-UNIT TABS per tablet Take 1 tablet by mouth 2 times daily (with meals)      omeprazole (PRILOSEC) 40 MG delayed release capsule Take 1 capsule by mouth daily 30 capsule 0    ALBUTEROL IN Inhale into the lungs       No current facility-administered medications on file prior to visit. Medications: documentation has been reviewed in the electronic medical record and patient office intake form. REVIEW OF SYSTEMS:  Constitutional: Negative for fever  HENT: Negative for sore throat  Eyes: Negative for redness   Respiratory: Negative for dyspnea, cough  Cardiovascular: Negative for chest pain  Gastrointestinal: Negative for vomiting, diarrhea   Genitourinary: Negative for hematuria   Musculoskeletal: Negative for arthralgias   Skin: Negative for rash  Neurological: Negative for syncope  Hematological: Negative for adenopathy  Psychiatric/Behavorial: Negative for anxiety         PHYSICAL EXAM:  /70 (Site: Left Upper Arm, Position: Sitting, Cuff Size: Medium Adult)   Pulse 83   Constitutional: She appearswell-nourished. No apparent distress. Breast: The patient was examined in the upright and supine position. Breasts are symmetrically ptotic. Right: No new masses or changes in breast contour. No skin changes of the breast or nipple areolar complex. No nipple inversion or discharge.  No erythema, thickening (peau d'orange), or dimpling. Left: well healed scar and expected post surgical changes. No new masses or changes in breast contour. No skin changes of the breast or nipple areolar complex. No nipple inversion or discharge. No erythema, thickening (peau d'orange), or dimpling. There is no axillary lymphadenopathy palpated bilaterally. Head: Normocephalic and atraumatic:   Eyes: EOM are normal. Pupils are equal, round, and reactive to light. Neck: Neck supple. No tracheal deviation present. No obvious mass. Cardiovascular: regular rate. Pulmonary: No accessory muscle use. Respirations non-labored and no wheezing. Lymphatics: No palpable supraclavicular, cervical, or axillary lymphadenopathy  Skin: No rash noted. No erythema. Neurologic: alert and oriented. Extremities: appear well perfused. No edema. No joint deformity             ASSESSMENT:  - Screening Breast Examination - stable breast examination and stable bilateral screening mammogram.    - Personal History of Breast Cancer - s/p left partial mastectomy, SLNB, [facility] 3/2018. 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 Microinvasion had been noted on prior bx. TisN0. ER/[place] positive. Postop radiation. On Arimidex  - Encounter for follow-up surveillance of breast cancer       PLAN:    Continue annual screening mammography with tomosynthesis   Continue annual clinical breast exams    Continue endocrine therapy per medical oncology    Signs/symptoms of recurrence were reviewed. She verbalizes understanding that she should notify the office if she identifies any abnormalities on self evaluation.    Follow up cancer surveillance discussed    Discussed the importance of breast awareness including the importance and technique of self breast exams   Today's imaging was reviewed and the results were discussed with the patient, all questions answered   Education provided for Healthy Lifestyle Recommendations: healthy diet, routine exercise, weight control, decreased alcohol consumption.  Follow up after annual mammogram in 1 year          LEXIS Astorga  [facility])   Surgical Breast Oncology   [phone]    All of the patient's questions were answered at this time however, she was encouraged to call the office with any further inquiries. Approximately 15 minutes of time were spent in this visit of which 50% or more of the time was related to coordination of care.

## 2020-07-31 ENCOUNTER — APPOINTMENT (OUTPATIENT)
Dept: GENERAL RADIOLOGY | Age: 85
DRG: 581 | End: 2020-07-31
Payer: MEDICARE

## 2020-07-31 ENCOUNTER — ANESTHESIA (OUTPATIENT)
Dept: OPERATING ROOM | Age: 85
DRG: 581 | End: 2020-07-31
Payer: MEDICARE

## 2020-07-31 ENCOUNTER — ANESTHESIA EVENT (OUTPATIENT)
Dept: OPERATING ROOM | Age: 85
DRG: 581 | End: 2020-07-31
Payer: MEDICARE

## 2020-07-31 ENCOUNTER — HOSPITAL ENCOUNTER (INPATIENT)
Age: 85
LOS: 3 days | Discharge: NURSING, REHAB OR LONG-TERM CARE | DRG: 581 | End: 2020-08-03
Attending: EMERGENCY MEDICINE | Admitting: INTERNAL MEDICINE
Payer: MEDICARE

## 2020-07-31 VITALS
DIASTOLIC BLOOD PRESSURE: 58 MMHG | SYSTOLIC BLOOD PRESSURE: 119 MMHG | TEMPERATURE: 96.8 F | RESPIRATION RATE: 2 BRPM | OXYGEN SATURATION: 100 %

## 2020-07-31 PROBLEM — S81.012A KNEE LACERATION, LEFT, INITIAL ENCOUNTER: Status: ACTIVE | Noted: 2020-07-31

## 2020-07-31 LAB
ANION GAP SERPL CALCULATED.3IONS-SCNC: 11 MMOL/L (ref 3–16)
APTT: 31.5 SEC (ref 24.2–36.2)
BASOPHILS ABSOLUTE: 0 K/UL (ref 0–0.2)
BASOPHILS RELATIVE PERCENT: 0.5 %
BUN BLDV-MCNC: 30 MG/DL (ref 7–20)
CALCIUM SERPL-MCNC: 9.8 MG/DL (ref 8.3–10.6)
CHLORIDE BLD-SCNC: 106 MMOL/L (ref 99–110)
CO2: 26 MMOL/L (ref 21–32)
CREAT SERPL-MCNC: 1 MG/DL (ref 0.6–1.2)
EOSINOPHILS ABSOLUTE: 0.2 K/UL (ref 0–0.6)
EOSINOPHILS RELATIVE PERCENT: 3.2 %
GFR AFRICAN AMERICAN: >60
GFR NON-AFRICAN AMERICAN: 53
GLUCOSE BLD-MCNC: 111 MG/DL (ref 70–99)
HCT VFR BLD CALC: 36.1 % (ref 36–48)
HEMOGLOBIN: 11.8 G/DL (ref 12–16)
INR BLD: 0.94 (ref 0.86–1.14)
LYMPHOCYTES ABSOLUTE: 1.3 K/UL (ref 1–5.1)
LYMPHOCYTES RELATIVE PERCENT: 21.9 %
MCH RBC QN AUTO: 31.4 PG (ref 26–34)
MCHC RBC AUTO-ENTMCNC: 32.8 G/DL (ref 31–36)
MCV RBC AUTO: 95.8 FL (ref 80–100)
MONOCYTES ABSOLUTE: 0.7 K/UL (ref 0–1.3)
MONOCYTES RELATIVE PERCENT: 11.7 %
NEUTROPHILS ABSOLUTE: 3.6 K/UL (ref 1.7–7.7)
NEUTROPHILS RELATIVE PERCENT: 62.7 %
PDW BLD-RTO: 13.7 % (ref 12.4–15.4)
PLATELET # BLD: 192 K/UL (ref 135–450)
PMV BLD AUTO: 8.2 FL (ref 5–10.5)
POTASSIUM REFLEX MAGNESIUM: 4 MMOL/L (ref 3.5–5.1)
PROTHROMBIN TIME: 10.9 SEC (ref 10–13.2)
RBC # BLD: 3.76 M/UL (ref 4–5.2)
SARS-COV-2, NAAT: NOT DETECTED
SODIUM BLD-SCNC: 143 MMOL/L (ref 136–145)
WBC # BLD: 5.7 K/UL (ref 4–11)

## 2020-07-31 PROCEDURE — 2700000000 HC OXYGEN THERAPY PER DAY

## 2020-07-31 PROCEDURE — 6360000002 HC RX W HCPCS: Performed by: NURSE ANESTHETIST, CERTIFIED REGISTERED

## 2020-07-31 PROCEDURE — U0002 COVID-19 LAB TEST NON-CDC: HCPCS

## 2020-07-31 PROCEDURE — 85025 COMPLETE CBC W/AUTO DIFF WBC: CPT

## 2020-07-31 PROCEDURE — 90715 TDAP VACCINE 7 YRS/> IM: CPT | Performed by: EMERGENCY MEDICINE

## 2020-07-31 PROCEDURE — 94760 N-INVAS EAR/PLS OXIMETRY 1: CPT

## 2020-07-31 PROCEDURE — 97530 THERAPEUTIC ACTIVITIES: CPT

## 2020-07-31 PROCEDURE — 7100000001 HC PACU RECOVERY - ADDTL 15 MIN: Performed by: ORTHOPAEDIC SURGERY

## 2020-07-31 PROCEDURE — 3700000000 HC ANESTHESIA ATTENDED CARE: Performed by: ORTHOPAEDIC SURGERY

## 2020-07-31 PROCEDURE — 2580000003 HC RX 258: Performed by: EMERGENCY MEDICINE

## 2020-07-31 PROCEDURE — 6360000002 HC RX W HCPCS: Performed by: ORTHOPAEDIC SURGERY

## 2020-07-31 PROCEDURE — 6370000000 HC RX 637 (ALT 250 FOR IP): Performed by: INTERNAL MEDICINE

## 2020-07-31 PROCEDURE — 99284 EMERGENCY DEPT VISIT MOD MDM: CPT

## 2020-07-31 PROCEDURE — 3600000002 HC SURGERY LEVEL 2 BASE: Performed by: ORTHOPAEDIC SURGERY

## 2020-07-31 PROCEDURE — 96365 THER/PROPH/DIAG IV INF INIT: CPT

## 2020-07-31 PROCEDURE — 99222 1ST HOSP IP/OBS MODERATE 55: CPT | Performed by: ORTHOPAEDIC SURGERY

## 2020-07-31 PROCEDURE — 20103 EXPL PENTRG WOUND EXTREMITY: CPT | Performed by: ORTHOPAEDIC SURGERY

## 2020-07-31 PROCEDURE — 90471 IMMUNIZATION ADMIN: CPT | Performed by: EMERGENCY MEDICINE

## 2020-07-31 PROCEDURE — 73560 X-RAY EXAM OF KNEE 1 OR 2: CPT

## 2020-07-31 PROCEDURE — 6360000002 HC RX W HCPCS: Performed by: EMERGENCY MEDICINE

## 2020-07-31 PROCEDURE — 97166 OT EVAL MOD COMPLEX 45 MIN: CPT

## 2020-07-31 PROCEDURE — 3700000001 HC ADD 15 MINUTES (ANESTHESIA): Performed by: ORTHOPAEDIC SURGERY

## 2020-07-31 PROCEDURE — 80048 BASIC METABOLIC PNL TOTAL CA: CPT

## 2020-07-31 PROCEDURE — 85610 PROTHROMBIN TIME: CPT

## 2020-07-31 PROCEDURE — 2580000003 HC RX 258: Performed by: ORTHOPAEDIC SURGERY

## 2020-07-31 PROCEDURE — 2580000003 HC RX 258: Performed by: NURSE ANESTHETIST, CERTIFIED REGISTERED

## 2020-07-31 PROCEDURE — 6360000002 HC RX W HCPCS: Performed by: INTERNAL MEDICINE

## 2020-07-31 PROCEDURE — 97162 PT EVAL MOD COMPLEX 30 MIN: CPT

## 2020-07-31 PROCEDURE — 97535 SELF CARE MNGMENT TRAINING: CPT

## 2020-07-31 PROCEDURE — 6370000000 HC RX 637 (ALT 250 FOR IP): Performed by: ORTHOPAEDIC SURGERY

## 2020-07-31 PROCEDURE — 85730 THROMBOPLASTIN TIME PARTIAL: CPT

## 2020-07-31 PROCEDURE — 7100000000 HC PACU RECOVERY - FIRST 15 MIN: Performed by: ORTHOPAEDIC SURGERY

## 2020-07-31 PROCEDURE — 36415 COLL VENOUS BLD VENIPUNCTURE: CPT

## 2020-07-31 PROCEDURE — 97116 GAIT TRAINING THERAPY: CPT

## 2020-07-31 PROCEDURE — 1200000000 HC SEMI PRIVATE

## 2020-07-31 PROCEDURE — 94640 AIRWAY INHALATION TREATMENT: CPT

## 2020-07-31 PROCEDURE — 0JQP0ZZ REPAIR LEFT LOWER LEG SUBCUTANEOUS TISSUE AND FASCIA, OPEN APPROACH: ICD-10-PCS | Performed by: ORTHOPAEDIC SURGERY

## 2020-07-31 PROCEDURE — 2500000003 HC RX 250 WO HCPCS: Performed by: NURSE ANESTHETIST, CERTIFIED REGISTERED

## 2020-07-31 PROCEDURE — 2709999900 HC NON-CHARGEABLE SUPPLY: Performed by: ORTHOPAEDIC SURGERY

## 2020-07-31 PROCEDURE — 2580000003 HC RX 258: Performed by: INTERNAL MEDICINE

## 2020-07-31 PROCEDURE — 3600000012 HC SURGERY LEVEL 2 ADDTL 15MIN: Performed by: ORTHOPAEDIC SURGERY

## 2020-07-31 RX ORDER — PANTOPRAZOLE SODIUM 40 MG/1
40 TABLET, DELAYED RELEASE ORAL
Status: DISCONTINUED | OUTPATIENT
Start: 2020-08-01 | End: 2020-08-03 | Stop reason: HOSPADM

## 2020-07-31 RX ORDER — SODIUM CHLORIDE 0.9 % (FLUSH) 0.9 %
10 SYRINGE (ML) INJECTION PRN
Status: DISCONTINUED | OUTPATIENT
Start: 2020-07-31 | End: 2020-08-03 | Stop reason: HOSPADM

## 2020-07-31 RX ORDER — ALENDRONATE SODIUM 35 MG/1
70 TABLET ORAL
Status: DISCONTINUED | OUTPATIENT
Start: 2020-07-31 | End: 2020-07-31 | Stop reason: RX

## 2020-07-31 RX ORDER — BISMUTH SUBSALICYLATE 262 MG/1
262 TABLET, CHEWABLE ORAL DAILY PRN
Status: DISCONTINUED | OUTPATIENT
Start: 2020-07-31 | End: 2020-08-03 | Stop reason: HOSPADM

## 2020-07-31 RX ORDER — FENTANYL CITRATE 50 UG/ML
50 INJECTION, SOLUTION INTRAMUSCULAR; INTRAVENOUS EVERY 5 MIN PRN
Status: DISCONTINUED | OUTPATIENT
Start: 2020-07-31 | End: 2020-07-31 | Stop reason: HOSPADM

## 2020-07-31 RX ORDER — 0.9 % SODIUM CHLORIDE 0.9 %
500 INTRAVENOUS SOLUTION INTRAVENOUS ONCE
Status: COMPLETED | OUTPATIENT
Start: 2020-07-31 | End: 2020-07-31

## 2020-07-31 RX ORDER — MAGNESIUM HYDROXIDE 1200 MG/15ML
LIQUID ORAL CONTINUOUS PRN
Status: COMPLETED | OUTPATIENT
Start: 2020-07-31 | End: 2020-07-31

## 2020-07-31 RX ORDER — SODIUM CHLORIDE 0.9 % (FLUSH) 0.9 %
10 SYRINGE (ML) INJECTION PRN
Status: DISCONTINUED | OUTPATIENT
Start: 2020-07-31 | End: 2020-07-31 | Stop reason: SDUPTHER

## 2020-07-31 RX ORDER — ASPIRIN 81 MG/1
81 TABLET ORAL 2 TIMES DAILY
Qty: 28 TABLET | Refills: 0 | Status: SHIPPED | OUTPATIENT
Start: 2020-07-31 | End: 2021-11-02

## 2020-07-31 RX ORDER — ONDANSETRON 2 MG/ML
4 INJECTION INTRAMUSCULAR; INTRAVENOUS EVERY 6 HOURS PRN
Status: DISCONTINUED | OUTPATIENT
Start: 2020-07-31 | End: 2020-08-03 | Stop reason: HOSPADM

## 2020-07-31 RX ORDER — GLYCOPYRROLATE 0.2 MG/ML
INJECTION INTRAMUSCULAR; INTRAVENOUS PRN
Status: DISCONTINUED | OUTPATIENT
Start: 2020-07-31 | End: 2020-07-31 | Stop reason: SDUPTHER

## 2020-07-31 RX ORDER — VENLAFAXINE HYDROCHLORIDE 75 MG/1
150 CAPSULE, EXTENDED RELEASE ORAL DAILY
Status: DISCONTINUED | OUTPATIENT
Start: 2020-07-31 | End: 2020-08-03 | Stop reason: HOSPADM

## 2020-07-31 RX ORDER — CALCIUM CARBONATE/VITAMIN D3 250-3.125
1 TABLET ORAL 2 TIMES DAILY WITH MEALS
Status: DISCONTINUED | OUTPATIENT
Start: 2020-07-31 | End: 2020-08-03 | Stop reason: HOSPADM

## 2020-07-31 RX ORDER — SODIUM CHLORIDE 9 MG/ML
INJECTION, SOLUTION INTRAVENOUS CONTINUOUS PRN
Status: DISCONTINUED | OUTPATIENT
Start: 2020-07-31 | End: 2020-07-31 | Stop reason: SDUPTHER

## 2020-07-31 RX ORDER — ONDANSETRON 2 MG/ML
4 INJECTION INTRAMUSCULAR; INTRAVENOUS
Status: DISCONTINUED | OUTPATIENT
Start: 2020-07-31 | End: 2020-07-31 | Stop reason: HOSPADM

## 2020-07-31 RX ORDER — BUPIVACAINE HYDROCHLORIDE 5 MG/ML
INJECTION, SOLUTION EPIDURAL; INTRACAUDAL
Status: DISCONTINUED | OUTPATIENT
Start: 2020-07-31 | End: 2020-07-31 | Stop reason: ALTCHOICE

## 2020-07-31 RX ORDER — CEFAZOLIN SODIUM 1 G/3ML
2 INJECTION, POWDER, FOR SOLUTION INTRAMUSCULAR; INTRAVENOUS
Status: DISCONTINUED | OUTPATIENT
Start: 2020-07-31 | End: 2020-07-31 | Stop reason: CLARIF

## 2020-07-31 RX ORDER — ERGOCALCIFEROL 1.25 MG/1
50000 CAPSULE ORAL WEEKLY
Status: DISCONTINUED | OUTPATIENT
Start: 2020-08-04 | End: 2020-08-03 | Stop reason: HOSPADM

## 2020-07-31 RX ORDER — SODIUM CHLORIDE 9 MG/ML
INJECTION, SOLUTION INTRAVENOUS CONTINUOUS
Status: DISCONTINUED | OUTPATIENT
Start: 2020-07-31 | End: 2020-07-31 | Stop reason: SDUPTHER

## 2020-07-31 RX ORDER — SENNA AND DOCUSATE SODIUM 50; 8.6 MG/1; MG/1
1 TABLET, FILM COATED ORAL 2 TIMES DAILY
Status: DISCONTINUED | OUTPATIENT
Start: 2020-07-31 | End: 2020-08-03 | Stop reason: HOSPADM

## 2020-07-31 RX ORDER — SODIUM CHLORIDE 0.9 % (FLUSH) 0.9 %
10 SYRINGE (ML) INJECTION EVERY 12 HOURS SCHEDULED
Status: DISCONTINUED | OUTPATIENT
Start: 2020-07-31 | End: 2020-08-03 | Stop reason: HOSPADM

## 2020-07-31 RX ORDER — MEPERIDINE HYDROCHLORIDE 25 MG/ML
12.5 INJECTION INTRAMUSCULAR; INTRAVENOUS; SUBCUTANEOUS
Status: DISCONTINUED | OUTPATIENT
Start: 2020-07-31 | End: 2020-07-31 | Stop reason: HOSPADM

## 2020-07-31 RX ORDER — IPRATROPIUM BROMIDE AND ALBUTEROL SULFATE 2.5; .5 MG/3ML; MG/3ML
1 SOLUTION RESPIRATORY (INHALATION) EVERY 4 HOURS PRN
Status: DISCONTINUED | OUTPATIENT
Start: 2020-07-31 | End: 2020-08-03 | Stop reason: HOSPADM

## 2020-07-31 RX ORDER — ASCORBIC ACID 500 MG
250 TABLET ORAL DAILY
Status: DISCONTINUED | OUTPATIENT
Start: 2020-07-31 | End: 2020-08-03 | Stop reason: HOSPADM

## 2020-07-31 RX ORDER — FENTANYL CITRATE 50 UG/ML
25 INJECTION, SOLUTION INTRAMUSCULAR; INTRAVENOUS EVERY 5 MIN PRN
Status: DISCONTINUED | OUTPATIENT
Start: 2020-07-31 | End: 2020-07-31 | Stop reason: HOSPADM

## 2020-07-31 RX ORDER — HYDROCODONE BITARTRATE AND ACETAMINOPHEN 5; 325 MG/1; MG/1
1 TABLET ORAL PRN
Status: DISCONTINUED | OUTPATIENT
Start: 2020-07-31 | End: 2020-07-31 | Stop reason: HOSPADM

## 2020-07-31 RX ORDER — TRIAMTERENE AND HYDROCHLOROTHIAZIDE 75; 50 MG/1; MG/1
1 TABLET ORAL DAILY
Status: DISCONTINUED | OUTPATIENT
Start: 2020-07-31 | End: 2020-08-03 | Stop reason: HOSPADM

## 2020-07-31 RX ORDER — HYDROCODONE BITARTRATE AND ACETAMINOPHEN 5; 325 MG/1; MG/1
2 TABLET ORAL PRN
Status: DISCONTINUED | OUTPATIENT
Start: 2020-07-31 | End: 2020-07-31 | Stop reason: HOSPADM

## 2020-07-31 RX ORDER — ACETAMINOPHEN 325 MG/1
650 TABLET ORAL EVERY 6 HOURS PRN
Status: DISCONTINUED | OUTPATIENT
Start: 2020-07-31 | End: 2020-08-03 | Stop reason: HOSPADM

## 2020-07-31 RX ORDER — ACETAMINOPHEN 650 MG/1
650 SUPPOSITORY RECTAL EVERY 6 HOURS PRN
Status: DISCONTINUED | OUTPATIENT
Start: 2020-07-31 | End: 2020-08-03 | Stop reason: HOSPADM

## 2020-07-31 RX ORDER — PROMETHAZINE HYDROCHLORIDE 25 MG/1
12.5 TABLET ORAL EVERY 6 HOURS PRN
Status: DISCONTINUED | OUTPATIENT
Start: 2020-07-31 | End: 2020-08-03 | Stop reason: HOSPADM

## 2020-07-31 RX ORDER — SODIUM CHLORIDE 0.9 % (FLUSH) 0.9 %
10 SYRINGE (ML) INJECTION EVERY 12 HOURS SCHEDULED
Status: DISCONTINUED | OUTPATIENT
Start: 2020-07-31 | End: 2020-07-31 | Stop reason: SDUPTHER

## 2020-07-31 RX ORDER — ANASTROZOLE 1 MG/1
1 TABLET ORAL DAILY
Status: DISCONTINUED | OUTPATIENT
Start: 2020-07-31 | End: 2020-08-03 | Stop reason: HOSPADM

## 2020-07-31 RX ORDER — BUDESONIDE AND FORMOTEROL FUMARATE DIHYDRATE 160; 4.5 UG/1; UG/1
2 AEROSOL RESPIRATORY (INHALATION) 2 TIMES DAILY
Status: DISCONTINUED | OUTPATIENT
Start: 2020-07-31 | End: 2020-08-03 | Stop reason: HOSPADM

## 2020-07-31 RX ORDER — SODIUM CHLORIDE 450 MG/100ML
INJECTION, SOLUTION INTRAVENOUS CONTINUOUS
Status: DISCONTINUED | OUTPATIENT
Start: 2020-07-31 | End: 2020-07-31

## 2020-07-31 RX ORDER — PROPOFOL 10 MG/ML
INJECTION, EMULSION INTRAVENOUS PRN
Status: DISCONTINUED | OUTPATIENT
Start: 2020-07-31 | End: 2020-07-31 | Stop reason: SDUPTHER

## 2020-07-31 RX ORDER — CLONAZEPAM 0.5 MG/1
0.5 TABLET ORAL 2 TIMES DAILY PRN
Status: DISCONTINUED | OUTPATIENT
Start: 2020-07-31 | End: 2020-08-03 | Stop reason: HOSPADM

## 2020-07-31 RX ORDER — POLYETHYLENE GLYCOL 3350 17 G/17G
17 POWDER, FOR SOLUTION ORAL DAILY PRN
Status: DISCONTINUED | OUTPATIENT
Start: 2020-07-31 | End: 2020-08-03 | Stop reason: HOSPADM

## 2020-07-31 RX ORDER — HYDRALAZINE HYDROCHLORIDE 20 MG/ML
5 INJECTION INTRAMUSCULAR; INTRAVENOUS EVERY 10 MIN PRN
Status: DISCONTINUED | OUTPATIENT
Start: 2020-07-31 | End: 2020-07-31 | Stop reason: HOSPADM

## 2020-07-31 RX ORDER — LIDOCAINE HYDROCHLORIDE 20 MG/ML
INJECTION, SOLUTION EPIDURAL; INFILTRATION; INTRACAUDAL; PERINEURAL PRN
Status: DISCONTINUED | OUTPATIENT
Start: 2020-07-31 | End: 2020-07-31 | Stop reason: SDUPTHER

## 2020-07-31 RX ORDER — PROMETHAZINE HYDROCHLORIDE 25 MG/ML
6.25 INJECTION, SOLUTION INTRAMUSCULAR; INTRAVENOUS
Status: DISCONTINUED | OUTPATIENT
Start: 2020-07-31 | End: 2020-07-31 | Stop reason: HOSPADM

## 2020-07-31 RX ADMIN — TETANUS TOXOID, REDUCED DIPHTHERIA TOXOID AND ACELLULAR PERTUSSIS VACCINE, ADSORBED 0.5 ML: 5; 2.5; 8; 8; 2.5 SUSPENSION INTRAMUSCULAR at 06:28

## 2020-07-31 RX ADMIN — Medication 250 MG: at 11:39

## 2020-07-31 RX ADMIN — SODIUM CHLORIDE 500 ML: 9 INJECTION, SOLUTION INTRAVENOUS at 06:29

## 2020-07-31 RX ADMIN — BUDESONIDE AND FORMOTEROL FUMARATE DIHYDRATE 2 PUFF: 160; 4.5 AEROSOL RESPIRATORY (INHALATION) at 19:40

## 2020-07-31 RX ADMIN — CALCIUM CARBONATE-CHOLECALCIFEROL TAB 250 MG-125 UNIT 250 MG: 250-125 TAB at 11:39

## 2020-07-31 RX ADMIN — TRIAMTERENE AND HYDROCHLOROTHIAZIDE 1 TABLET: 75; 50 TABLET ORAL at 13:48

## 2020-07-31 RX ADMIN — SODIUM CHLORIDE: 4.5 INJECTION, SOLUTION INTRAVENOUS at 11:38

## 2020-07-31 RX ADMIN — ACETAMINOPHEN 650 MG: 325 TABLET ORAL at 22:15

## 2020-07-31 RX ADMIN — SODIUM CHLORIDE: 9 INJECTION, SOLUTION INTRAVENOUS at 07:49

## 2020-07-31 RX ADMIN — CEFAZOLIN SODIUM 2 G: 10 INJECTION, POWDER, FOR SOLUTION INTRAVENOUS at 22:15

## 2020-07-31 RX ADMIN — BUDESONIDE AND FORMOTEROL FUMARATE DIHYDRATE 2 PUFF: 160; 4.5 AEROSOL RESPIRATORY (INHALATION) at 12:30

## 2020-07-31 RX ADMIN — CEFAZOLIN SODIUM 2 G: 10 INJECTION, POWDER, FOR SOLUTION INTRAVENOUS at 07:40

## 2020-07-31 RX ADMIN — SODIUM CHLORIDE, PRESERVATIVE FREE 10 ML: 5 INJECTION INTRAVENOUS at 11:40

## 2020-07-31 RX ADMIN — PROPOFOL 120 MG: 10 INJECTION, EMULSION INTRAVENOUS at 07:59

## 2020-07-31 RX ADMIN — CEFAZOLIN SODIUM 2 G: 10 INJECTION, POWDER, FOR SOLUTION INTRAVENOUS at 06:34

## 2020-07-31 RX ADMIN — LIDOCAINE HYDROCHLORIDE 50 MG: 20 INJECTION, SOLUTION EPIDURAL; INFILTRATION; INTRACAUDAL; PERINEURAL at 07:58

## 2020-07-31 RX ADMIN — DOCUSATE SODIUM 50 MG AND SENNOSIDES 8.6 MG 1 TABLET: 8.6; 5 TABLET, FILM COATED ORAL at 22:15

## 2020-07-31 RX ADMIN — CALCIUM CARBONATE-CHOLECALCIFEROL TAB 250 MG-125 UNIT 250 MG: 250-125 TAB at 17:40

## 2020-07-31 RX ADMIN — ANASTROZOLE 1 MG: 1 TABLET ORAL at 13:48

## 2020-07-31 RX ADMIN — SODIUM CHLORIDE, PRESERVATIVE FREE 10 ML: 5 INJECTION INTRAVENOUS at 22:17

## 2020-07-31 RX ADMIN — ENOXAPARIN SODIUM 40 MG: 40 INJECTION SUBCUTANEOUS at 11:39

## 2020-07-31 RX ADMIN — DOCUSATE SODIUM 50 MG AND SENNOSIDES 8.6 MG 1 TABLET: 8.6; 5 TABLET, FILM COATED ORAL at 11:38

## 2020-07-31 RX ADMIN — GLYCOPYRROLATE 0.2 MG: 0.2 INJECTION, SOLUTION INTRAMUSCULAR; INTRAVENOUS at 07:40

## 2020-07-31 RX ADMIN — VENLAFAXINE HYDROCHLORIDE 150 MG: 75 CAPSULE, EXTENDED RELEASE ORAL at 11:39

## 2020-07-31 RX ADMIN — CEFAZOLIN SODIUM 2 G: 10 INJECTION, POWDER, FOR SOLUTION INTRAVENOUS at 13:49

## 2020-07-31 ASSESSMENT — PULMONARY FUNCTION TESTS
PIF_VALUE: 19
PIF_VALUE: 0
PIF_VALUE: 6
PIF_VALUE: 3
PIF_VALUE: 0
PIF_VALUE: 1
PIF_VALUE: 9
PIF_VALUE: 8
PIF_VALUE: 0
PIF_VALUE: 8
PIF_VALUE: 0
PIF_VALUE: 0
PIF_VALUE: 1
PIF_VALUE: 7
PIF_VALUE: 3
PIF_VALUE: 3
PIF_VALUE: 2
PIF_VALUE: 8
PIF_VALUE: 0
PIF_VALUE: 3
PIF_VALUE: 3
PIF_VALUE: 7
PIF_VALUE: 0
PIF_VALUE: 5
PIF_VALUE: 1
PIF_VALUE: 1
PIF_VALUE: 3
PIF_VALUE: 8
PIF_VALUE: 4
PIF_VALUE: 2
PIF_VALUE: 6
PIF_VALUE: 3
PIF_VALUE: 0
PIF_VALUE: 16
PIF_VALUE: 3
PIF_VALUE: 8
PIF_VALUE: 2
PIF_VALUE: 3
PIF_VALUE: 0
PIF_VALUE: 3
PIF_VALUE: 17
PIF_VALUE: 8
PIF_VALUE: 8
PIF_VALUE: 3
PIF_VALUE: 9
PIF_VALUE: 27
PIF_VALUE: 3
PIF_VALUE: 3

## 2020-07-31 ASSESSMENT — PAIN SCALES - GENERAL
PAINLEVEL_OUTOF10: 0
PAINLEVEL_OUTOF10: 4
PAINLEVEL_OUTOF10: 5
PAINLEVEL_OUTOF10: 3
PAINLEVEL_OUTOF10: 0

## 2020-07-31 ASSESSMENT — ENCOUNTER SYMPTOMS
EYE DISCHARGE: 0
CHOKING: 0
EYE ITCHING: 0
APNEA: 0
WHEEZING: 0
SHORTNESS OF BREATH: 0
ABDOMINAL DISTENTION: 0
COUGH: 0
PHOTOPHOBIA: 0
EYE PAIN: 0
SHORTNESS OF BREATH: 0
EYE REDNESS: 0
CHEST TIGHTNESS: 0
STRIDOR: 0

## 2020-07-31 ASSESSMENT — PAIN - FUNCTIONAL ASSESSMENT
PAIN_FUNCTIONAL_ASSESSMENT: PREVENTS OR INTERFERES WITH MANY ACTIVE NOT PASSIVE ACTIVITIES
PAIN_FUNCTIONAL_ASSESSMENT: ACTIVITIES ARE NOT PREVENTED
PAIN_FUNCTIONAL_ASSESSMENT: PREVENTS OR INTERFERES SOME ACTIVE ACTIVITIES AND ADLS
PAIN_FUNCTIONAL_ASSESSMENT: 0-10

## 2020-07-31 ASSESSMENT — PAIN DESCRIPTION - LOCATION
LOCATION: KNEE

## 2020-07-31 ASSESSMENT — PAIN DESCRIPTION - ORIENTATION
ORIENTATION: LEFT

## 2020-07-31 ASSESSMENT — PAIN DESCRIPTION - ONSET
ONSET: SUDDEN
ONSET: SUDDEN
ONSET: ON-GOING

## 2020-07-31 ASSESSMENT — PAIN DESCRIPTION - FREQUENCY
FREQUENCY: CONTINUOUS
FREQUENCY: CONTINUOUS
FREQUENCY: INTERMITTENT

## 2020-07-31 ASSESSMENT — LIFESTYLE VARIABLES: SMOKING_STATUS: 0

## 2020-07-31 ASSESSMENT — PAIN SCALES - WONG BAKER
WONGBAKER_NUMERICALRESPONSE: 0
WONGBAKER_NUMERICALRESPONSE: 6
WONGBAKER_NUMERICALRESPONSE: 0

## 2020-07-31 ASSESSMENT — PAIN DESCRIPTION - DESCRIPTORS
DESCRIPTORS: DISCOMFORT
DESCRIPTORS: ACHING
DESCRIPTORS: ACHING

## 2020-07-31 ASSESSMENT — PAIN DESCRIPTION - PAIN TYPE
TYPE: ACUTE PAIN;SURGICAL PAIN
TYPE: ACUTE PAIN
TYPE: ACUTE PAIN

## 2020-07-31 ASSESSMENT — PAIN DESCRIPTION - PROGRESSION
CLINICAL_PROGRESSION: NOT CHANGED
CLINICAL_PROGRESSION: GRADUALLY IMPROVING
CLINICAL_PROGRESSION: GRADUALLY WORSENING

## 2020-07-31 NOTE — ED NOTES
Bed: B-34  Expected date:   Expected time:   Means of arrival:   Comments:  [address]     [address] [facility]  07/31/20 0884

## 2020-07-31 NOTE — H&P
Hospital Medicine History & Physical      PCP: Stef Aparicio MD    Date of Admission: 2020    Date of Service: Pt seen/examined on 2020 and Admitted to Inpatient    Chief Complaint: fall at home, left knee pain. History Of Present Illness: The patient is a 82 y.o. female w Hx of asthma, GERD, degenerative disc disease, who presents to [facility] with left knee pain after a fall. Pt reports she fell asleep in front of the TV last night. Woke up at 5am and while making her way to bed decided to make sure that her back door and garage doors are closed as she usually does. While she was standing on the lower step reaching out for the garage door she lost balance and followed the door till she collapsed on the ground. She was unable to get up. She grabbed a CD and knocked on the door till her  heard her and came to help. They called EMS. While waiting for the EMS, he brought her water and towels and she was patting her knee till she was able to slow the bleeding down and realized she had a large laceration on her left knee. Pt denies syncope. No chest pain, no SOB, no vertigo. Denies focal neurological symptoms prior or after the fall.  Reports chronic neck and low back pain that has been unchanged - follows with a rheumatologist.         Past Medical History:        Diagnosis Date    Asthma     CKD (chronic kidney disease)     Depression     GERD (gastroesophageal reflux disease)     Neuropathy     Osteoporosis     Pernicious anemia     Scoliosis        Past Surgical History:        Procedure Laterality Date     SECTION       SECTION      x`s    KNEE SURGERY Left 2020    INCISION AND DRAINAGE LEFT KNEE performed by Stephanie Gunderson MD at [place] [address]       Medications Prior to Admission:    Prior to Admission medications    Medication Sig Start Date End Date Taking? Authorizing Provider   anastrozole (ARIMIDEX) 1 MG tablet Take 1 mg by mouth daily   Yes Historical Provider, MD   albuterol sulfate  (90 Base) MCG/ACT inhaler Inhale 2 puffs into the lungs every 6 hours as needed for Wheezing   Yes Historical Provider, MD   fluticasone-salmeterol (ADVAIR HFA) 115-21 MCG/ACT inhaler Inhale 2 puffs into the lungs 2 times daily   Yes Historical Provider, MD   bismuth subsalicylate (PEPTO BISMOL) 262 MG chewable tablet 262 mg 1/21/18  Yes Historical Provider, MD   triamterene-hydrochlorothiazide (MAXZIDE) 75-50 MG per tablet  3/2/18  Yes Historical Provider, MD   Ergocalciferol (VITAMIN D2) 2000 units TABS Take 50,000 Units by mouth Twice weekly   Yes Historical Provider, MD   alendronate (FOSAMAX) 70 MG tablet Take 70 mg by mouth every 7 days   Yes Historical Provider, MD   RaNITidine HCl (RANITIDINE 75 PO) Take by mouth   Yes Historical Provider, MD   venlafaxine (EFFEXOR XR) 150 MG extended release capsule Take 150 mg by mouth daily   Yes Historical Provider, MD   Ascorbic Acid (VITAMIN C) 250 MG tablet Take 250 mg by mouth daily   Yes Historical Provider, MD   calcium citrate-vitamin D (CITRICAL + D) 315-250 MG-UNIT TABS per tablet Take 1 tablet by mouth 2 times daily (with meals)   Yes Historical Provider, MD   omeprazole (PRILOSEC) 40 MG delayed release capsule Take 1 capsule by mouth daily 2/10/18  Yes COLBY Carrasco   clonazePAM (KLONOPIN) 0.5 MG tablet Take 0.5 mg by mouth 2 times daily as needed. Historical Provider, MD   ALBUTEROL IN Inhale into the lungs    Historical Provider, MD       Allergies:  Latex    Social History:  The patient currently lives at home with . TOBACCO:   reports that she has never smoked. She has never used smokeless tobacco.  ETOH:   reports no history of alcohol use. Family History:  Reviewed in detail and negative for DM, Early CAD, Cancer, CVA.

## 2020-07-31 NOTE — PLAN OF CARE
Problem: Falls - Risk of:  Goal: Will remain free from falls  Description: Will remain free from falls  Outcome: Ongoing   Will remain free from falls. Fall prevention measures are in place. Call light, telephone and bedside table are within reach. Hourly rounding per unit protocols. Problem: Falls - Risk of:  Goal: Absence of physical injury  Description: Absence of physical injury  Outcome: Ongoing   Absence of physical injury   Problem: Pain:  Goal: Pain level will decrease  Description: Pain level will decrease  Outcome: Ongoing   Pain level will decrease  Problem: Pain:  Goal: Control of acute pain  Description: Control of acute pain  Outcome: Ongoing   Patient educated on acute pain. Taught patient to use call light to ask for pain medication. PRN pain medication given for acute pain. Will continue to monitor pain per unit protocol.     Problem: Pain:  Goal: Control of chronic pain  Description: Control of chronic pain  Outcome: Ongoing

## 2020-07-31 NOTE — ED NOTES
Patient put in hospital gown.  OR staff notified patient not to receive blood      Mable Nathan RN  07/31/20 [id number]

## 2020-07-31 NOTE — PROGRESS NOTES
Patient is resting in chair, awake and quiet. Patient is on room air. Chair alarm on and feet elevated. Call light, telephone and bedside table are within reach. Patient denies pain and refuses pain medication at this time. Will continue to monitor patient per unit protocols.  Electronically signed by Ana Ray RN on 7/31/2020 at 6:48 PM

## 2020-07-31 NOTE — ANESTHESIA POSTPROCEDURE EVALUATION
Department of Anesthesiology  Postprocedure Note    Patient: Laura Cornelius  MRN: [id number]  YOB: 1935  Date of evaluation: 7/31/2020  Time:  10:19 AM     Procedure Summary     Date:  07/31/20 Room / Location:  [address]    Anesthesia Start:  5529 Anesthesia Stop:  4827    Procedure:  INCISION AND DRAINAGE LEFT KNEE (Left Knee) Diagnosis:  (.)    Surgeon:  Leeanna Gosselin, MD Responsible Provider:  Renetta Young MD    Anesthesia Type:  general ASA Status:  3          Anesthesia Type: general    Emerald Phase I: Emerald Score: 8    Emerald Phase II:      Last vitals: Reviewed and per EMR flowsheets.        Anesthesia Post Evaluation    Patient location during evaluation: PACU  Patient participation: complete - patient participated  Level of consciousness: awake and alert  Pain score: 2  Airway patency: patent  Nausea & Vomiting: no nausea and no vomiting  Complications: no  Cardiovascular status: blood pressure returned to baseline  Respiratory status: acceptable  Hydration status: euvolemic

## 2020-07-31 NOTE — PROGRESS NOTES
To pacu from OR. Pt awake. Denies pain at present. Dressing to left knee dry and intact. Left pedal pulse palpable. IV infusing. Monitor in sinus rhythm.

## 2020-07-31 NOTE — ANESTHESIA PRE PROCEDURE
capsule by mouth daily 30 capsule 0    ALBUTEROL IN Inhale into the lungs       Vital Signs (Current)   Vitals:    20 0559 20 0700 20 0721   BP: (!) 167/79 (!) 161/74 (!) 189/83   Pulse: 70 73 76   Resp: 18 14    Temp: 98.4 °F (36.9 °C)  97.7 °F (36.5 °C)   TempSrc: Oral  Oral   SpO2: 98% 98% 97%   Weight: 169 lb 6.4 oz (76.8 kg)     Height: 5' 4\" (1.626 m)                                            BP Readings from Last 3 Encounters:   20 (!) 189/83   20 128/19 (!) 153/75     Vital Signs Statistics (for past 48 hrs)     Temp  Av.1 °F (36.7 °C)  Min: 97.7 °F (36.5 °C)   Min taken time: 20  Max: 98.4 °F (36.9 °C)   Max taken time: 20  Pulse  Av  Min: 79   Min taken time: 20 0559  Max: 68   Max taken time: 20 0721  Resp  Av  Min: 15   Min taken time: 2000  Max: 25   Max taken time: 20 0559  BP  Min: 161/74   Min taken time: 20 0700  Max: 189/83   Max taken time: 20 0721  MAP (mmHg)  Av  Min: 103   Min taken time: 20 0559  Max: 119   Max taken time: 2021  SpO2  Av.7 %  Min: 97 %   Min taken time: 20  Max: 98 %   Max taken time: 20 07  BP Readings from Last 3 Encounters:   20 (!) 189/83   20 128/70   19 (!) 153/75       BMI  Body mass index is 29.08 kg/m². Estimated body mass index is 29.08 kg/m² as calculated from the following:    Height as of this encounter: 5' 4\" (1.626 m). Weight as of this encounter: 169 lb 6.4 oz (76.8 kg).     CBC   Lab Results   Component Value Date    WBC 5.7 2020    RBC 3.76 2020    HGB 11.8 2020    HCT 36.1 2020    MCV 95.8 2020    RDW 13.7 2020     2020     CMP    Lab Results   Component Value Date     2020    K 4.0 2020     2020    CO2 26 2020    BUN 30 2020    CREATININE 1.0 2020    GFRAA >60 2020 AGRATIO 1.4 02/10/2018    LABGLOM 53 07/31/2020    GLUCOSE 111 07/31/2020    PROT 7.3 02/10/2018    CALCIUM 9.8 07/31/2020    BILITOT 0.6 02/10/2018    ALKPHOS 62 02/10/2018    AST 26 02/10/2018    ALT 15 02/10/2018     BMP    Lab Results   Component Value Date     07/31/2020    K 4.0 07/31/2020     07/31/2020    CO2 26 07/31/2020    BUN 30 07/31/2020    CREATININE 1.0 07/31/2020    CALCIUM 9.8 07/31/2020    GFRAA >60 07/31/2020    LABGLOM 53 07/31/2020    GLUCOSE 111 07/31/2020     POCGlucose  Recent Labs     07/31/20  0630   GLUCOSE 111*      Coags    Lab Results   Component Value Date    PROTIME 10.9 07/31/2020    INR 0.94 07/31/2020    APTT 31.5 38/34/6521     HCG (If Applicable) No results found for: PREGTESTUR, PREGSERUM, HCG, HCGQUANT   ABGs No results found for: PHART, PO2ART, PAS1JVF, GIH8XHU, BEART, O2TSEWKE   Type & Screen (If Applicable)  No results found for: LABABO, LABRH                         BMI: Wt Readings from Last 3 Encounters:       NPO Status:  >8h                          Anesthesia Evaluation  Patient summary reviewed no history of anesthetic complications:   Airway: Mallampati: II  TM distance: >3 FB   Neck ROM: full  Mouth opening: > = 3 FB Dental: normal exam         Pulmonary: breath sounds clear to auscultation  (+) asthma:     (-) COPD, shortness of breath, sleep apnea and not a current smoker                           Cardiovascular:  Exercise tolerance: good (>4 METS),       (-) hypertension, past MI and CABG/stent      Rhythm: regular  Rate: normal                    Neuro/Psych:   (+) psychiatric history:   (-) seizures, TIA and CVA           GI/Hepatic/Renal:   (+) GERD:, renal disease: CRI,           Endo/Other:        (-) hypothyroidism, hyperthyroidism               Abdominal:           Vascular:                                        Anesthesia Plan      general     ASA 3       Induction: intravenous.     MIPS: Postoperative opioids intended and Prophylactic antiemetics administered. Anesthetic plan and risks discussed with patient. Plan discussed with CRNA. This pre-anesthesia assessment may be used as a history and physical.    DOS STAFF ADDENDUM:    Pt seen and examined, chart reviewed (including anesthesia, drug and allergy history). No interval changes to history and physical examination. Anesthetic plan, risks, benefits, alternatives, and personnel involved discussed with patient. Patient verbalized an understanding and agrees to proceed.       Renetta Young MD  July 31, 2020  7:27 AM

## 2020-07-31 NOTE — PROGRESS NOTES
amb 10' CGA w/ RW from EOB to commode and voided bladder. Pt then amb 21' CGA w/ RW from bathroom to [facility] chair with good control of RW. Currenty patient is performing below baseline and would benefit from continued therapy while in the acute setting. D/t decreased help from family members and patient presentation, anticipate pt to tolerate mod/high intensity therapy to improve strength, endurance, and independence. Will continue to follow and progress and progress as patient tolerates. Prognosis: Good  Decision Making: Medium Complexity  History: as noted  PT Education: Goals;PT Role;Plan of Care  REQUIRES PT FOLLOW UP: Yes  Activity Tolerance  Activity Tolerance: Patient Tolerated treatment well       Patient Diagnosis(es): The encounter diagnosis was Laceration of knee with complication, initial encounter. has a past medical history of Asthma, CKD (chronic kidney disease), Depression, GERD (gastroesophageal reflux disease), Neuropathy, Osteoporosis, Pernicious anemia, and Scoliosis. has a past surgical history that includes  section;  section; and knee surgery (Left, 2020). Restrictions  Restrictions/Precautions  Restrictions/Precautions: Fall Risk, Weight Bearing  Lower Extremity Weight Bearing Restrictions  Left Lower Extremity Weight Bearing: Weight Bearing As Tolerated  Position Activity Restriction  Other position/activity restrictions: Per Dr. Sergo Helm consult note 2020: \"Ms. Bajwa 82 y.o.  female presents today to [facility] ED via EMS for evaluation of a left knee injury which occurred when she was at home and fell on her left knee with open large laceration. \"  Vision/Hearing  Vision: Impaired  Vision Exceptions: Wears glasses at all times  Hearing: Within functional limits     Subjective  General  Chart Reviewed: Yes  Patient assessed for rehabilitation services?: Yes  Additional Pertinent Hx: Per Dr. Sergo Helm consult note 2020: \"Ms. Bajwa 82 y.o.   female presents today to [facility] ED via EMS for evaluation of a left knee injury which occurred when she was at home and fell on her left knee with open large laceration. \"  Family / Caregiver Present: No  Referring Practitioner: Bella Solo MD  Referral Date : 07/31/20  Follows Commands: Within Functional Limits  Subjective  Subjective: Pt lying in bed reports having to go to the bathroom. Agreeable to therapy. Pleasant patient. Pain Screening  Patient Currently in Pain: (Reporting mild/moderate pain in knee)  Orientation  Orientation  Overall Orientation Status: Within Functional Limits  Social/Functional History  Social/Functional History  Lives With: Spouse  Type of Home: House  Home Layout: Multi-level(3 floors 7 steps to bedroom and bathroom)  Home Access: Stairs to enter with rails  Entrance Stairs - Number of Steps: 7, 14 total steps to get to bedroom and bathroom  Entrance Stairs - Rails: Left  Bathroom Shower/Tub: Walk-in shower  Bathroom Toilet: Standard(one toliet is taller in the othe bathroom)  Bathroom Equipment: Shower chair, Grab bars in shower  Bathroom Accessibility: Walker accessible  Home Equipment: 4 wheeled walker, Rolling walker, [address], [employer] Help From: Family  ADL Assistance: [address]: Independent  Homemaking Responsibilities: Yes(Does need assist wit grocery shopping)  Ambulation Assistance: Independent  Transfer Assistance: Independent  Active :  Yes  Additional Comments: Reports having decreased balance for about a month, but no falls  Cognition   Cognition  Overall Cognitive Status: WFL    Objective  AROM RLE (degrees)  RLE AROM: WFL  AROM LLE (degrees)  LLE AROM : WFL  Strength RLE  Strength RLE: WFL  Strength LLE  Comment: did not assess  Bed mobility  Supine to Sit: Stand by assistance(HOB elevated, use of hand rails, and increased time)  Sit to Supine: Unable to assess(Pt in BS chair at end of session)  Transfers  Sit to Stand: Minimal Assistance(w/ cues for proper hand placement)  Stand to sit: Contact guard assistance;Minimal Assistance  Comment: Pt min A for 1st sit> stand transfer, had a small LOB and had to sit back down.  Second sit>stand attempt CGA/min A. Sit>stand from commode min/mod A  Ambulation  Ambulation?: Yes  WB Status: WBAT  Ambulation 1  Surface: level tile  Device: Rolling Walker  Assistance: Contact guard assistance  Quality of Gait: slow, step to pattern w/ decreased step length and height. good control of RW  Gait Deviations: Slow Sindi;Decreased step length;Decreased step height  Distance: 10', 21'  Comments: Pt amb to commode and voided bladder and then amb to BS chair  Stairs/Curb  Stairs?: No  Balance  Posture: Good(improved w/ cues to stand upright)  Sitting - Static: Good  Sitting - Dynamic: Good  Standing - Static: [email or web address], small LOB that required pt to sit back on EOB)  Standing - Dynamic: [email or web address])    Plan   Plan  Times per week: 5-7  Current Treatment Recommendations: Strengthening, Endurance Training, Balance Training, Stair training, Gait Training, Home Exercise Program  Safety Devices  Type of devices: Left in chair, Call light within reach, Chair alarm in place, Nurse notified, Patient at risk for falls    AM-PAC Score  AM-PAC Inpatient Mobility Raw Score : 17 (07/31/20 1614)  AM-PAC Inpatient T-Scale Score : 42.13 (07/31/20 1614)  Mobility Inpatient CMS 0-100% Score: 50.57 (07/31/20 1614)  Mobility Inpatient CMS G-Code Modifier : CK (07/31/20 1614)    Goals  Short term goals  Time Frame for Short term goals: acute DC  Short term goal 1: amb 50' SBA w/ RW  Short term goal 2: transfers CGA/SBA  Short term goal 3: ascend/descend stairs CGA  Patient Goals   Patient goals : none stated at this time       Therapy Time   Individual Concurrent Group Co-treatment   Time In [address] [place] [address]         Time Out 1605         Minutes [address] Physical Therapist  I attest that I was present for and made a skilled & mindful clinical judgement during the evaluation and/or treatment of this patient on 7/31/2020  Electronically signed by Alma Delia Bazan, PT [id number] on 7/31/2020 at 4:48 PM

## 2020-07-31 NOTE — CONSULTS
[facility] Orthopedic Surgery  Consult Note        This patient is seen in consultation at the request of Dr Asa Diehl MD    Reason for Consult:  Left knee traumatic open wound. CHIEF COMPLAINT:  Left knee cut wound/ fall    History Obtained From:  patient, spouse, electronic medical record    HISTORY OF PRESENT ILLNESS:    Ms. Tammie Myers 82 y.o.  female presents today to [facility] ED via EMS for evaluation of a left knee injury which occurred when she was at home and fell on her left knee with open large laceration. She was first seen and evaluated in [facility] ED, where she was evaluated, and I was consulted. She is complaining of left knee pain, open wound and swelling. This is better with elevation and worse with bearing any wt. The pain is sharp and not radiating. No other complaint. Past Medical History:        Diagnosis Date    Asthma     CKD (chronic kidney disease)     Depression     GERD (gastroesophageal reflux disease)     Neuropathy     Osteoporosis     Pernicious anemia     Scoliosis        Past Surgical History:        Procedure Laterality Date     SECTION       SECTION      x`s       Medications prior to admission:   Prior to Admission medications    Medication Sig Start Date End Date Taking?  Authorizing Provider   anastrozole (ARIMIDEX) 1 MG tablet Take 1 mg by mouth daily    Historical Provider, MD   albuterol sulfate  (90 Base) MCG/ACT inhaler Inhale 2 puffs into the lungs every 6 hours as needed for Wheezing    Historical Provider, MD   fluticasone-salmeterol (ADVAIR HFA) 115-21 MCG/ACT inhaler Inhale 2 puffs into the lungs 2 times daily    Historical Provider, MD   bismuth subsalicylate (PEPTO BISMOL) 262 MG chewable tablet 262 mg 18   Historical Provider, MD   triamterene-hydrochlorothiazide (MAXZIDE) 75-50 MG per tablet  3/2/18   Historical Provider, MD   Ergocalciferol (VITAMIN D2) 2000 units TABS Take 50,000 Units by mouth Twice weekly Historical Provider, MD   alendronate (FOSAMAX) 70 MG tablet Take 70 mg by mouth every 7 days    Historical Provider, MD   clonazePAM (KLONOPIN) 0.5 MG tablet Take 0.5 mg by mouth 2 times daily as needed.     Historical Provider, MD   RaNITidine HCl (RANITIDINE 75 PO) Take by mouth    Historical Provider, MD   venlafaxine (EFFEXOR XR) 150 MG extended release capsule Take 150 mg by mouth daily    Historical Provider, MD   Ascorbic Acid (VITAMIN C) 250 MG tablet Take 250 mg by mouth daily    Historical Provider, MD   calcium citrate-vitamin D (CITRICAL + D) 315-250 MG-UNIT TABS per tablet Take 1 tablet by mouth 2 times daily (with meals)    Historical Provider, MD   omeprazole (PRILOSEC) 40 MG delayed release capsule Take 1 capsule by mouth daily 2/10/18   COLBY Cao   ALBUTEROL IN Inhale into the lungs    Historical Provider, MD       Current Medications:   Current Facility-Administered Medications: ceFAZolin (ANCEF) 2 g in dextrose 5 % 100 mL IVPB, 2 g, Intravenous, Once  0.9 % sodium chloride bolus, 500 mL, Intravenous, Once    Allergies:  Latex    Social History     Socioeconomic History    Marital status:      Spouse name: Not on file    Number of children: Not on file    Years of education: Not on file    Highest education level: Not on file   Occupational History    Not on file   Social Needs    Financial resource strain: Not on file    Food insecurity     Worry: Not on file     Inability: Not on file    Transportation needs     Medical: Not on file     Non-medical: Not on file   Tobacco Use    Smoking status: Never Smoker    Smokeless tobacco: Never Used   Substance and Sexual Activity    Alcohol use: No    Drug use: No    Sexual activity: Not Currently   Lifestyle    Physical activity     Days per week: Not on file     Minutes per session: Not on file    Stress: Not on file   Relationships    Social connections     Talks on phone: Not on file     Gets together: Not on file Attends Zoroastrianism service: Not on file     Active member of club or organization: Not on file     Attends meetings of clubs or organizations: Not on file     Relationship status: Not on file    Intimate partner violence     Fear of current or ex partner: Not on file     Emotionally abused: Not on file     Physically abused: Not on file     Forced sexual activity: Not on file   Other Topics Concern    Not on file   Social History Narrative    Not on file       Family History:  Family History   Problem Relation Age of Onset    Cancer Mother     Heart Disease Father        REVIEW OF SYSTEMS:   CONSTITUTIONAL: Denies unexplained weight loss, fevers, chills or fatigue  NEUROLOGICAL: Denies unsteady gait or progressive weakness    PSYCHOLOGICAL: Denies anxiety, depression   SKIN: Denies skin changes, delayed healing, rash, itching   HEMATOLOGIC: Denies easy bleeding or bruising  ENDOCRINE: Denies excessive thirst, urination, heat/cold  RESPIRATORY: Denies current dyspnea, cough  CARDIOVASCULAR: Negative for chest pain at this time. EYES: Negative for photophobia and visual disturbance. ENT:  Negative for rhinorrhea, epistaxis, sore throat, or hearing loss. GI: Denies nausea, vomiting, diarrhea   : Denies bowel or bladder issues   MUSCULOSKELETAL: Left knee pain and wound. All other ROS reviewed in chart or with patient or family and are grossly negative. PHYSICAL EXAMINATION:  Ms. Kelly Beavers is a very pleasant 82 y.o. female who seen today in no acute distress, awake, alert, and oriented. She is well nourished and groomed. Patient with normal affect. Body mass index is 29.08 kg/m². . Skin warm and dry. Resting respiratory rate is 16. Resp deep and easy.  Pulse is with regular rate and rhythm    BP (!) 167/79   Pulse 70   Temp 98.4 °F (36.9 °C) (Oral)   Resp 18   Ht 5' 4\" (1.626 m)   Wt 169 lb 6.4 oz (76.8 kg)   SpO2 98%   BMI 29.08 kg/m²        Airway is intact  Chest: chest clear, no wheezing, rales, normal symmetric air entry, no tachypnea, retractions or cyanosis  Heart: regular rate and rhythm ; heart sounds normal   Hearing intact, pupil equal and reactive bilateral  Lymphatics; No groin or axillary enlarged lymph nodes. Neck; No swelling  Abdomen; soft, non distended. MUSCULOSKELETAL:   Examination of both knees showing a decreased range of motion of the left knee compare to the other side. There is moderate swelling that can be seen, as well as ecchymosis with large anterior laceration with ability to activley extend the knee. She has intact sensation and good distal pulses. She has tenderness on deep palpation over the left knee. NEUROLOGIC:   Sensory:    Touch:                     Right Upper Extremity:  normal                   Left Upper Extremity:  normal                  Right Lower Extremity:  normal                  Left Lower Extremity:  normal                DATA:    CBC:   Lab Results   Component Value Date    WBC 5.7 07/31/2020    RBC 3.76 07/31/2020    HGB 11.8 07/31/2020    HCT 36.1 07/31/2020    MCV 95.8 07/31/2020    MCH 31.4 07/31/2020    MCHC 32.8 07/31/2020    RDW 13.7 07/31/2020     07/31/2020    MPV 8.2 07/31/2020     WBC:    Lab Results   Component Value Date    WBC 5.7 07/31/2020     PT/INR:  No results found for: PROTIME, INR  PTT:  No results found for: APTT[APTT    IMAGING: X-rays were taken in the ED today, 3 views of the left knee, and showed no fracture. No other abnormality. IMPRESSION: Left knee traumatic open wound      PLAN:  I discussed with Desmond Steele and her  the treatment options including both surgical and non-surgical treatment, and that my recommendation is an open exploration of left knee wound, débridement and closure.     I discussed the risks and benefits of surgery with the patient, including but not limited to infection, bleeding, pain, injury to nerves or blood vessels failure of the surgery and need for additional surgery. All the patient's questions were answered. We discussed an expected post-operative course. She  is understanding of this and wishes to proceed. Surgery urgently now. Thank you very much for the kind consultation and allowing me to participate in this patient's care. I will continue to keep you apprised of her progress.          Feng Napier MD   7/31/2020  6:49 AM

## 2020-07-31 NOTE — PROGRESS NOTES
Occupational Therapy   Occupational Therapy Initial Assessment  Date: 2020   Patient Name: Mallika Sanchez  MRN: [id number]     : 1935    Date of Service: 2020    Assessment: Pt is 82 y.o.  female presents today to [facility] ED via EMS for evaluation of a left knee injury which occurred when she was at home and fell on her left knee with open large laceration. Pt is s/p debridement and closure of L knee. PTA pt lives in multilevel home with at least 14 steps to floor with bedroom/bathroom. Pt reports independence in self-care tasks, shares homemaking responsibilities with  and completes functional mobility with 4WW when in community. Currently, pt presents with ROM/strength in [facility] for self-care and transfers. Pt completed bed mobility with SBA and si t,> stand transfers with varying assist from CGA to mod A from commode. Pt completed functional mobility with RW with CGA, no overt LOB however slow pace. Pt required assist for LB ADLs and toileting needs - anticipate requiring mod A for ADL needs at d/c. Pt would benefit from continued therapy to increase mobility/balance, strength, and independence prior to d/c home. Anticipate pt able to tolerate high frequency therapy. Discharge Recommendations:  5-7 sessions per week  OT Equipment Recommendations  Other: TBD    Mallika Sanchez scored a 17/24 on the AM-PAC ADL Inpatient form. Current research shows that an AM-PAC score of 17 or less is typically not associated with a discharge to the patient's home setting. Based on the patient's AM-PAC score and their current ADL deficits, it is recommended that the patient have 5-7 sessions per week of Occupational Therapy at d/c to increase the patient's independence. At this time, this patient demonstrates the endurance, and/or tolerance for 3 hours of therapy each day, with a treatment frequency of 5-7x/wk. Please see assessment section for further patient specific details.     If patient discharges prior to next session this note will serve as a discharge summary. Please see below for the latest assessment towards goals. Assessment   Performance deficits / Impairments: Decreased functional mobility ; Decreased balance;Decreased ADL status; Decreased endurance;Decreased strength  Assessment: Pt is 82 y.o.  female presents today to [facility] ED via EMS for evaluation of a left knee injury which occurred when she was at home and fell on her left knee with open large laceration. Pt is s/p debridement and closure of L knee. PTA pt lives in multilevel home with at least 14 steps to floor with bedroom/bathroom. Pt reports independence in self-care tasks, shares homemaking responsibilities with  and completes functional mobility with 4WW when in community. Currently, pt presents with ROM/strength in [facility] for self-care and transfers. Pt completed bed mobility with SBA and si t,> stand transfers with varying assist from CGA to mod A from commode. Pt completed functional mobility with RW with CGA, no overt LOB however slow pace. Pt required assist for LB ADLs and toileting needs - anticipate requiring mod A for ADL needs at d/c. Pt would benefit from continued therapy to increase mobility/balance, strength, and independence prior to d/c home. Anticipate pt able to tolerate high frequency therapy. Prognosis: Fair;Good  Decision Making: Medium Complexity  History: PMH; scoliosis, CKD, GERD  Exam: ADLs, transfers, func mob, bed mob  OT Education: OT Role;Transfer Training;Plan of Care;Precautions; ADL Adaptive Strategies  REQUIRES OT FOLLOW UP: Yes  Activity Tolerance  Activity Tolerance: Patient limited by fatigue;Patient limited by pain; Patient Tolerated treatment well  Safety Devices  Safety Devices in place: Yes([employer]) aware)  Type of devices: Nurse notified;Gait belt;Call light within reach; Chair alarm in place; Left in chair           Patient Diagnosis(es): The encounter diagnosis was Laceration of knee with complication, initial encounter. has a past medical history of Asthma, CKD (chronic kidney disease), Depression, GERD (gastroesophageal reflux disease), Neuropathy, Osteoporosis, Pernicious anemia, and Scoliosis. has a past surgical history that includes  section;  section; and knee surgery (Left, 2020). Restrictions  Restrictions/Precautions  Restrictions/Precautions: Fall Risk, Weight Bearing  Lower Extremity Weight Bearing Restrictions  Left Lower Extremity Weight Bearing: Weight Bearing As Tolerated  Position Activity Restriction  Other position/activity restrictions: Per Dr. Chintan Roman consult note 2020: \"Ms. Bajwa 82 y.o.  female presents today to [facility] ED via EMS for evaluation of a left knee injury which occurred when she was at home and fell on her left knee with open large laceration. \"    Subjective   General  Chart Reviewed: Yes  Patient assessed for rehabilitation services?: Yes  Additional Pertinent Hx: Per Dr. Chintan Roman consult note 2020: \"Ms. Bajwa 82 y.o.  female presents today to [facility] ED via EMS for evaluation of a left knee injury which occurred when she was at home and fell on her left knee with open large laceration. \"  Family / Caregiver Present: No  Referring Practitioner: Chintan Roman MD  Diagnosis: L knee laceration  Subjective  Subjective: Pt met bedside, agreeable for therapy evaluation and mobility tasks - requesting to use restroom ASAP.   Patient Currently in Pain: (Reporting mild/moderate pain in knee)  Vital Signs  Temp: 98.1 °F (36.7 °C)  Temp Source: Oral  Pulse: 76  Heart Rate Source: Monitor  Resp: 18  BP: (!) 160/76  BP Location: Left Arm  BP Upper/Lower: Upper  MAP (mmHg): 104  Patient Currently in Pain: (Reporting mild/moderate pain in knee)  Oxygen Therapy  SpO2: 96 %  O2 Device: None (Room air)     Social/Functional History  Social/Functional History  Lives With: Spouse  Type of Home: House  Home Layout: Multi-level(3 floors 7 steps to bedroom and bathroom)  Home Access: Stairs to enter with rails  Entrance Stairs - Number of Steps: 7, 14 total steps to get to bedroom and bathroom  Entrance Stairs - Rails: Left  Bathroom Shower/Tub: Walk-in shower  Bathroom Toilet: Standard(one toliet is taller in the othe bathroom)  Bathroom Equipment: Shower chair, Grab bars in shower  Bathroom Accessibility: Walker accessible  Home Equipment: 4 wheeled walker, Rolling walker, [employer], [place] [employer] From: Family  ADL Assistance: [address]: Independent  Homemaking Responsibilities: Yes(Does need assist wit grocery shopping)  Ambulation Assistance: Independent  Transfer Assistance: Independent  Active : Yes  Additional Comments: Reports having decreased balance for about a month, but no falls       Objective   Vision: Impaired  Vision Exceptions: Wears glasses at all times  Hearing: Within functional limits      Orientation  Overall Orientation Status: Within Functional Limits     Balance  Sitting Balance: Stand by assistance  Standing Balance: Contact guard assistance  Standing Balance  Time: ~2-3 minutes  Activity: Func mob, transfers, and ADL tasks    Functional Mobility  Functional - Mobility Device: Rolling Walker  Activity: To/from bathroom  Assist Level: Contact guard assistance  Functional Mobility Comments: Pt completed functional mobility with RW with CGA, steady with no overt LOB. Slow pace and cues for safety. Toilet Transfers  Toilet - Technique: Ambulating(RW)  Equipment Used: Grab bars(Comfort height commode)  Toilet Transfer: Minimal assistance; Moderate assistance  Toilet Transfers Comments: Cues to kick LLE out in order for knee to not bend so harshly    ADL  Grooming: Contact guard assistance(Washed hands at sink with CGA)  LE Dressing: (Assist to change socks and thread underwear over feet)  Toileting: (Assist to manage clothing, pt completed pericare while seated)  Additional Comments: PTA pt reports independence in self-care tasks. Anticipate pt to require min/mod A for LB ADLs, SBA for UB ADLs, CGA/min A for toileting. Tone RUE  RUE Tone: Normotonic  Tone LUE  LUE Tone: Normotonic  Coordination  Movements Are Fluid And Coordinated: Yes     Bed mobility  Supine to Sit: (HOB elevated, use of rail, increased time)  Sit to Supine: Unable to assess(Up in chair at end of session)     Transfers  Sit to stand: Contact guard assistance;Minimal assistance  Stand to sit: Contact guard assistance;Minimal assistance  Transfer Comments: Initial sit <> stand from EOB to RW required min A - posterior lean and sat back to EOB. Pt required CGA to stand again to RW with cues to correct. Cognition  Overall Cognitive Status: WFL        Sensation  Overall Sensation Status: WFL        LUE AROM (degrees)  LUE AROM : WFL  Left Hand AROM (degrees)  Left Hand AROM: WFL  RUE AROM (degrees)  RUE AROM : WFL  Right Hand AROM (degrees)  Right Hand AROM: WFL     LUE Strength  Gross LUE Strength: WFL  RUE Strength  Gross RUE Strength: WFL          Plan   Plan  Times per week: 3-5  Current Treatment Recommendations: Strengthening, Endurance Training, Balance Training, Functional Mobility Training, Safety Education & Training, Equipment Evaluation, Education, & procurement, Patient/Caregiver Education & Training, Self-Care / ADL    AM-PAC Score    Mohan Robles scored a 17/24 on the AM-PAC ADL Inpatient form. Current research shows that an AM-PAC score of 17 or less is typically not associated with a discharge to the patient's home setting. Based on the patient's AM-PAC score and their current ADL deficits, it is recommended that the patient have 5-7 sessions per week of Occupational Therapy at d/c to increase the patient's independence.   At this time, this patient demonstrates the endurance, and/or tolerance for 3 hours of therapy each day, with a treatment frequency of 5-7x/wk. Please see assessment section for further patient specific details. If patient discharges prior to next session this note will serve as a discharge summary. Please see below for the latest assessment towards goals. AM-PAC Inpatient Daily Activity Raw Score: 17 (07/31/20 1617)  AM-PAC Inpatient ADL T-Scale Score : 37.26 (07/31/20 1617)  ADL Inpatient CMS 0-100% Score: 50.11 (07/31/20 1617)  ADL Inpatient CMS G-Code Modifier : CK (07/31/20 1617)    Goals  Short term goals  Time Frame for Short term goals: prior to d/c  Short term goal 1: Pt will complete functional mobility and transfers with SBA  Short term goal 2: Pt will complete toileting with SBA  Short term goal 3: Pt will complete dressing with SBA  Short term goal 4: Pt will complete bathing with SBA  Short term goal 5: Pt will tolerate 3+ minutes of standing activity to increase strength and activity tolerance for self-care and transfers  Patient Goals   Patient goals : \"to get better\"       Therapy Time   Individual Concurrent Group Co-treatment   Time In       1525   Time Out       1605   Minutes       40   Timed Code Treatment Minutes: 25 Minutes(15 min eval)     If pt is discharged prior to next OT session, this note will serve as the discharge summary.     Drake Wood, RAHUL/U#[id number]

## 2020-07-31 NOTE — ED PROVIDER NOTES
[address] [facility]      Pt Name: Kofi Early  MRN: [id number]  [place] 1935  Date of evaluation: 7/31/2020  Provider: Tim Bolaños MD    CHIEF COMPLAINT       Chief Complaint   Patient presents with    Laceration     Laceration to left patellar area. Deep 7-8 cm across. No fall, believed to hit side of an opened door. HISTORY OF PRESENT ILLNESS    Kofi Early is a 82 y.o. female who presents to the emergency department with knee laceration. Patient had mechanical fall with left knee laceration. States she thinks she hit it on the door. Pain is acute 9/10 sharp and constant in nature. Nothing makes symptoms better but movement makes symptoms worse. This has never happened before. No other associated symptoms other than previously mentioned. Denies head trauma. Nursing Notes were reviewed. Including nursing noted for FM, Surgical History, Past Medical History, Social History, vitals, and allergies; agree with all. REVIEW OF SYSTEMS       Review of Systems   Constitutional: Negative for activity change, appetite change, chills, diaphoresis, fatigue, fever and unexpected weight change. HENT: Negative for congestion, dental problem, drooling and ear discharge. Eyes: Negative for photophobia, pain, discharge, redness and itching. Respiratory: Negative for apnea, cough, choking, chest tightness, shortness of breath, wheezing and stridor. Cardiovascular: Negative for chest pain and leg swelling. Gastrointestinal: Negative for abdominal distention. Endocrine: Negative for polyphagia and polyuria. Genitourinary: Negative for vaginal bleeding, vaginal discharge and vaginal pain. Musculoskeletal: Positive for arthralgias. Neurological: Negative for dizziness, facial asymmetry and headaches. Hematological: Negative for adenopathy. Does not bruise/bleed easily.    Psychiatric/Behavioral: Negative for agitation, behavioral problems, confusion, decreased concentration, dysphoric mood, hallucinations, self-injury, sleep disturbance and suicidal ideas. The patient is not nervous/anxious and is not hyperactive. Except as noted above the remainder of the review of systems was reviewed and negative. PAST MEDICAL HISTORY     Past Medical History:   Diagnosis Date    Asthma     CKD (chronic kidney disease)     Depression     GERD (gastroesophageal reflux disease)     Neuropathy     Osteoporosis     Pernicious anemia     Scoliosis        SURGICAL HISTORY       Past Surgical History:   Procedure Laterality Date     SECTION       SECTION      x`s       CURRENT MEDICATIONS       Previous Medications    ALBUTEROL IN    Inhale into the lungs    ALBUTEROL SULFATE  (90 BASE) MCG/ACT INHALER    Inhale 2 puffs into the lungs every 6 hours as needed for Wheezing    ALENDRONATE (FOSAMAX) 70 MG TABLET    Take 70 mg by mouth every 7 days    ANASTROZOLE (ARIMIDEX) 1 MG TABLET    Take 1 mg by mouth daily    ASCORBIC ACID (VITAMIN C) 250 MG TABLET    Take 250 mg by mouth daily    BISMUTH SUBSALICYLATE (PEPTO BISMOL) 262 MG CHEWABLE TABLET    262 mg    CALCIUM CITRATE-VITAMIN D (CITRICAL + D) 315-250 MG-UNIT TABS PER TABLET    Take 1 tablet by mouth 2 times daily (with meals)    CLONAZEPAM (KLONOPIN) 0.5 MG TABLET    Take 0.5 mg by mouth 2 times daily as needed.     ERGOCALCIFEROL (VITAMIN D2) 2000 UNITS TABS    Take 50,000 Units by mouth Twice weekly    FLUTICASONE-SALMETEROL (ADVAIR HFA) 115-21 MCG/ACT INHALER    Inhale 2 puffs into the lungs 2 times daily    OMEPRAZOLE (PRILOSEC) 40 MG DELAYED RELEASE CAPSULE    Take 1 capsule by mouth daily    RANITIDINE HCL (RANITIDINE 75 PO)    Take by mouth    TRIAMTERENE-HYDROCHLOROTHIAZIDE (MAXZIDE) 75-50 MG PER TABLET        VENLAFAXINE (EFFEXOR XR) 150 MG EXTENDED RELEASE CAPSULE    Take 150 mg by mouth daily       ALLERGIES     Latex    FAMILY HISTORY        Family History Problem Relation Age of Onset    Cancer Mother     Heart Disease Father        SOCIAL HISTORY       Social History     Socioeconomic History    Marital status:      Spouse name: Not on file    Number of children: Not on file    Years of education: Not on file    Highest education level: Not on file   Occupational History    Not on file   Social Needs    Financial resource strain: Not on file    Food insecurity     Worry: Not on file     Inability: Not on file    Transportation needs     Medical: Not on file     Non-medical: Not on file   Tobacco Use    Smoking status: Never Smoker    Smokeless tobacco: Never Used   Substance and Sexual Activity    Alcohol use: No    Drug use: No    Sexual activity: Not Currently   Lifestyle    Physical activity     Days per week: Not on file     Minutes per session: Not on file    Stress: Not on file   Relationships    Social connections     Talks on phone: Not on file     Gets together: Not on file     Attends Episcopal service: Not on file     Active member of club or organization: Not on file     Attends meetings of clubs or organizations: Not on file     Relationship status: Not on file    Intimate partner violence     Fear of current or ex partner: Not on file     Emotionally abused: Not on file     Physically abused: Not on file     Forced sexual activity: Not on file   Other Topics Concern    Not on file   Social History Narrative    Not on file       PHYSICAL EXAM       ED Triage Vitals [07/31/20 0559]   BP Temp Temp Source Pulse Resp SpO2 Height Weight   (!) 167/79 98.4 °F (36.9 °C) Oral 70 18 98 % 5' 4\" (1.626 m) 169 lb 6.4 oz (76.8 kg)       Physical Exam  Vitals signs and nursing note reviewed. Constitutional:       General: She is not in acute distress. Appearance: She is well-developed. She is not ill-appearing, toxic-appearing or diaphoretic. HENT:      Head: Normocephalic and atraumatic.       Right Ear: External ear normal. Left Ear: External ear normal.   Eyes:      General:         Right eye: No discharge. Left eye: No discharge. Conjunctiva/sclera: Conjunctivae normal.      Pupils: Pupils are equal, round, and reactive to light. Neck:      Musculoskeletal: Normal range of motion and neck supple. Cardiovascular:      Rate and Rhythm: Normal rate and regular rhythm. Heart sounds: No murmur. Pulmonary:      Effort: Pulmonary effort is normal. No respiratory distress. Breath sounds: Normal breath sounds. No wheezing or rales. Abdominal:      General: Bowel sounds are normal. There is no distension. Palpations: Abdomen is soft. There is no mass. Tenderness: There is no abdominal tenderness. There is no guarding or rebound. Genitourinary:     Comments: Deferred  Musculoskeletal: Normal range of motion. Right lower leg: No edema. Left lower leg: No edema. Skin:     General: Skin is warm. Findings: No erythema or rash. Comments: Left knee laceration appears to involve joint space. Neurological:      Mental Status: She is alert and oriented to person, place, and time. She is not disoriented. Cranial Nerves: No cranial nerve deficit. Motor: No atrophy or abnormal muscle tone. Coordination: Coordination normal.   Psychiatric:         Behavior: Behavior normal.         Thought Content:  Thought content normal.       DIAGNOSTIC RESULTS     EKG: All EKG's are interpreted by the Emergency Department Physician who either signs or Co-signs this chart in the absence of acardiologist.    None    RADIOLOGY:   Non-plain film images such as CT, Ultrasoundand MRI are read by the radiologist. Plain radiographic images are visualized and preliminarily interpreted by the emergency physician with the below findings:    XR of knee shows     ED BEDSIDE ULTRASOUND:   Performed by ED Physician - none    LABS:  Labs Reviewed   CBC WITH AUTO DIFFERENTIAL - Abnormal; Notable for the following components:       Result Value    RBC 3.76 (*)     Hemoglobin 11.8 (*)     All other components within normal limits    Narrative:     Performed at:  [facility]  [address], [facility] [employer] [address]   Phone [phone]   BASIC METABOLIC PANEL W/ REFLEX TO MG FOR LOW K - Abnormal; Notable for the following components:    Glucose 111 (*)     BUN 30 (*)     GFR Non- 53 (*)     All other components within normal limits    Narrative:     Performed at:  [facility]  [address], [facility] [employer] [place] [address]   Phone [phone]   APTT    Narrative:     Performed at:  [address] [facility] [employer] [place] [address]   Phone [phone]   PROTIME-INR    Narrative:     Performed at:  [facility]  [address] [place] [address] [facility] [employer] [address]   Phone [phone]    Narrative:     Performed at:  [facility]  [address] [place] [address] [facility] [employer] [place] [address]   Phone [phone]   HEMOGLOBIN AND HEMATOCRIT, BLOOD     All other labs were withinnormal range or not returned as of this dictation. EMERGENCY DEPARTMENT COURSE and DIFFERENTIAL DIAGNOSIS/MDM:     PMH, Surgical Hx, FH, Social Hx reviewed by myself (ETOH usage, Tobacco usage, Drug usage reviewed by myself, no pertinent Hx)- No Pertinent Hx     Old records were reviewed by me    Dr Aj Mcguire came to ER and evaluated patient. Thinks patient would benefit from washout. Ancef given. Admission for washout. CRITICAL CARE TIME   Total Critical Caretime was 23 minutes, excluding separately reportable procedures. There was a high probability of clinically significant/life threatening deterioration in the patient's condition which required my urgent intervention. PROCEDURES:  Unlessotherwise noted below, none    FINAL IMPRESSION      1.  Laceration of knee with complication, initial encounter          DISPOSITION/PLAN   DISPOSITION      Admission    (Please note that portions ofthis note were completed with a voice recognition program.  Efforts were made to edit the dictations but occasionally words are mis-transcribed.)    Paula Perez MD(electronically signed)  Attending Emergency Physician            Paula Perez MD  07/31/20 6558

## 2020-07-31 NOTE — PLAN OF CARE
Problem: Falls - Risk of:  Goal: Will remain free from falls  Description: Will remain free from falls  7/31/2020 1935 by Mony Willingham RN  Outcome: Ongoing  Note: Patient educated on fall prevention. Call light is within reach, bed locked in lowest position, personal items within reach, and bed alarm is on. Will round on patient per unit guidelines. Problem: Falls - Risk of:  Goal: Absence of physical injury  Description: Absence of physical injury  7/31/2020 1935 by Mony Willingham RN  Outcome: Ongoing  Note: Pt is free of injury. No injury noted. Fall precautions in place. Call light within reach. Will monitor. Problem: Pain:  Goal: Pain level will decrease  Description: Pain level will decrease  7/31/2020 1935 by Mony Willingham RN  Outcome: Ongoing  Note: Pain /discomfort being managed with PRN analgesics per MD orders. Patient able to express presence and absence of pain and rate pain appropriately using numerical scale.         Problem: Pain:  Goal: Control of acute pain  Description: Control of acute pain  7/31/2020 1935 by Mony Willingham RN  Outcome: Ongoing     Problem: Pain:  Goal: Control of chronic pain  Description: Control of chronic pain  7/31/2020 1935 by Mony Willingham RN  Outcome: Ongoing

## 2020-07-31 NOTE — PROGRESS NOTES
Patient has arrived to the unit from PACU. Patient is resting in bed, awake and quiet. Patient is on 2L of O2 NC. Side rails are up x3. Fall precautions are in place. Bed alarm on. Bed is in lowest position. Call light, telephone and bedside table are within reach. Patient oriented to room and use of call light. VSS taken. Dressing to left knee is C, D, I. Will continue to monitor patient per unit protocols.  Electronically signed by Natalie Ty RN on 7/31/2020 at 11:02 AM

## 2020-07-31 NOTE — ANESTHESIA POSTPROCEDURE EVALUATION
Department of Anesthesiology  Postprocedure Note    Patient: Melanie Charles  MRN: [id number]  YOB: 1935  Date of evaluation: 7/31/2020  Time:  3:47 PM     Procedure Summary     Date:  07/31/20 Room / Location:  [place] [address] / [facility]    Anesthesia Start:  4660 Anesthesia Stop:  2787    Procedure:  INCISION AND DRAINAGE LEFT KNEE (Left Knee) Diagnosis:  (.)    Surgeon:  Karma Aargon MD Responsible Provider:  Lauri Nur MD    Anesthesia Type:  general ASA Status:  3          Anesthesia Type: general    Emerald Phase I: Emerald Score: 8    Emerald Phase II:      Last vitals: Reviewed and per EMR flowsheets.        Anesthesia Post Evaluation    Patient location during evaluation: PACU  Patient participation: complete - patient participated  Level of consciousness: awake and alert  Pain score: 3  Airway patency: patent  Nausea & Vomiting: no nausea and no vomiting  Complications: no  Cardiovascular status: blood pressure returned to baseline  Respiratory status: acceptable  Hydration status: euvolemic

## 2020-07-31 NOTE — DISCHARGE INSTR - COC
Continuity of Care Form    Patient Name: Viraj Brush   :  1935  MRN:  [id number]    Admit date:  2020  Discharge date:  ***    Code Status Order: Full Code   Advance Directives:   Advance Care Flowsheet Documentation     Date/Time Healthcare Directive Type of Healthcare Directive Copy in [address] Agent's Name Healthcare Agent's Phone Number    20 1025  No, patient does not have an advance directive for healthcare treatment -- -- -- -- --          Admitting Physician:  Debby Marquez MD  PCP: Annalee Schaumann, MD    Discharging Nurse: [facility] Unit/Room#: V0B-4148/5779-07  Discharging Unit Phone Number: ***    Emergency Contact:   Extended Emergency Contact Information  Primary Emergency Contact: Ventura PERKINS  [facility] [address] [place] [address] of [address] [place] [address] Phone: [phone]  Relation: Spouse  Secondary Emergency Contact: Florecita Mcclain [address] [place] [address] Phone: [phone]  Work Phone: [phone]  Relation: Child    Past Surgical History:  Past Surgical History:   Procedure Laterality Date     SECTION       SECTION      x`s    KNEE SURGERY Left 2020    INCISION AND DRAINAGE LEFT KNEE performed by Radha Rollins MD at [place] [address]       Immunization History:   Immunization History   Administered Date(s) Administered    Tdap (Boostrix, Adacel) 2015, 2020       Active Problems:  Patient Active Problem List   Diagnosis Code    Malignant neoplasm of upper-outer quadrant of left breast in female, estrogen receptor positive ([facility] [address] C50.412, Z17.0    Status post partial mastectomy of left breast Z90.12    Knee laceration, left, initial encounter S81.012A       Isolation/Infection:   Isolation          No Isolation        Patient Infection Status     Infection Onset Added Last Indicated Last Indicated By Review Planned thickness:15079}  Daily Fluid Restriction: {CHP DME Yes amt example:684626997}  Last Modified Barium Swallow with Video (Video Swallowing Test): {Done Not Done OIZQ:679004946}    Treatments at the Time of Hospital Discharge:   Respiratory Treatments: ***  Oxygen Therapy:  {Therapy; copd oxygen:08879}  Ventilator:    {MH CC Vent WDBW:625019200}    Rehab Therapies: Physical Therapy, Occupational Therapy and nursing  Weight Bearing Status/Restrictions: No weight bearing restirctions  Other Medical Equipment (for information only, NOT a DME order): Other Treatments: ***    Patient's personal belongings (please select all that are sent with patient):  {CHP DME Belongings:968001975}    RN SIGNATURE:  {Esignature:740386109}    CASE MANAGEMENT/SOCIAL WORK SECTION    Inpatient Status Date: ***    Readmission Risk Assessment Score:  Readmission Risk              Risk of Unplanned Readmission:        13           Discharging to Facility/ Agency   · Name:   · Address:  · Phone:  · Fax:    Dialysis Facility (if applicable)   · Name:  · Address:  · Dialysis Schedule:  · Phone:  · Fax:    / signature: {Esignature:911271018}    PHYSICIAN SECTION    Prognosis: Fair    Condition at Discharge: Stable    Rehab Potential (if transferring to Rehab): Good    Recommended Labs or Other Treatments After Discharge:   PT/OT    Physician Certification: I certify the above information and transfer of Desmond Steele  is necessary for the continuing treatment of the diagnosis listed and that she requires Acute Rehab for less 30 days.      Update Admission H&P: No change in H&P    PHYSICIAN SIGNATURE:  Electronically signed by LATASHA Baker CNP on 8/3/20 at 10:53 AM SUNNYT/ Dr Asuncion Foote in office in 2 weeks   [facility] and [facility], [address] [facility], [address] [place] [address]. [address], [phone]

## 2020-07-31 NOTE — BRIEF OP NOTE
Brief Postoperative Note      Patient: João Contreras  YOB: 1935  MRN: [id number]    Date of Procedure: 7/31/2020    Pre-Op Diagnosis: Left knee traumatic complex deep laceration, 10 cm. Post-Op Diagnosis: Same       Procedure(s):  DÉBRIDEMENT AND EXPLORATION LEFT KNEE COMPLEX LACERATION WITH REPAIR.     Surgeon(s):  Kris Colon MD    Assistant:  Surgical Assistant: Alexsandra Almanza    Anesthesia: General    Estimated Blood Loss (mL): Minimal    Complications: None    Specimens:   * No specimens in log *    Implants:  * No implants in log *      Drains: * No LDAs found *    Findings: SAME    Electronically signed by Kris Colon MD on 7/31/2020 at 8:44 AM

## 2020-07-31 NOTE — PROGRESS NOTES
Offered patient PRN pain medication for left knee. atient denies pain and refuses pain medication at this time. Will continue to monitor patient per unit protocols.

## 2020-07-31 NOTE — PROGRESS NOTES
Pt voids per bedpan. Denies pain. Report to [facility] [place]. To room per bed.

## 2020-08-01 LAB
HCT VFR BLD CALC: 33.6 % (ref 36–48)
HEMOGLOBIN: 11.4 G/DL (ref 12–16)

## 2020-08-01 PROCEDURE — 85014 HEMATOCRIT: CPT

## 2020-08-01 PROCEDURE — 6370000000 HC RX 637 (ALT 250 FOR IP): Performed by: INTERNAL MEDICINE

## 2020-08-01 PROCEDURE — 1200000000 HC SEMI PRIVATE

## 2020-08-01 PROCEDURE — 94640 AIRWAY INHALATION TREATMENT: CPT

## 2020-08-01 PROCEDURE — 2580000003 HC RX 258: Performed by: INTERNAL MEDICINE

## 2020-08-01 PROCEDURE — 6370000000 HC RX 637 (ALT 250 FOR IP): Performed by: NURSE PRACTITIONER

## 2020-08-01 PROCEDURE — 94760 N-INVAS EAR/PLS OXIMETRY 1: CPT

## 2020-08-01 PROCEDURE — 36415 COLL VENOUS BLD VENIPUNCTURE: CPT

## 2020-08-01 PROCEDURE — 94761 N-INVAS EAR/PLS OXIMETRY MLT: CPT

## 2020-08-01 PROCEDURE — 6370000000 HC RX 637 (ALT 250 FOR IP): Performed by: ORTHOPAEDIC SURGERY

## 2020-08-01 PROCEDURE — 6360000002 HC RX W HCPCS: Performed by: INTERNAL MEDICINE

## 2020-08-01 PROCEDURE — 85018 HEMOGLOBIN: CPT

## 2020-08-01 RX ORDER — CALCIUM CARBONATE 200(500)MG
500 TABLET,CHEWABLE ORAL 3 TIMES DAILY PRN
Status: DISCONTINUED | OUTPATIENT
Start: 2020-08-01 | End: 2020-08-03 | Stop reason: HOSPADM

## 2020-08-01 RX ADMIN — TRIAMTERENE AND HYDROCHLOROTHIAZIDE 1 TABLET: 75; 50 TABLET ORAL at 07:57

## 2020-08-01 RX ADMIN — DOCUSATE SODIUM 50 MG AND SENNOSIDES 8.6 MG 1 TABLET: 8.6; 5 TABLET, FILM COATED ORAL at 07:57

## 2020-08-01 RX ADMIN — ACETAMINOPHEN 650 MG: 325 TABLET ORAL at 11:47

## 2020-08-01 RX ADMIN — BUDESONIDE AND FORMOTEROL FUMARATE DIHYDRATE 2 PUFF: 160; 4.5 AEROSOL RESPIRATORY (INHALATION) at 08:44

## 2020-08-01 RX ADMIN — DOCUSATE SODIUM 50 MG AND SENNOSIDES 8.6 MG 1 TABLET: 8.6; 5 TABLET, FILM COATED ORAL at 20:50

## 2020-08-01 RX ADMIN — SODIUM CHLORIDE, PRESERVATIVE FREE 10 ML: 5 INJECTION INTRAVENOUS at 20:50

## 2020-08-01 RX ADMIN — ANASTROZOLE 1 MG: 1 TABLET ORAL at 07:57

## 2020-08-01 RX ADMIN — BUDESONIDE AND FORMOTEROL FUMARATE DIHYDRATE 2 PUFF: 160; 4.5 AEROSOL RESPIRATORY (INHALATION) at 19:38

## 2020-08-01 RX ADMIN — CLONAZEPAM 0.5 MG: 0.5 TABLET ORAL at 17:13

## 2020-08-01 RX ADMIN — SODIUM CHLORIDE, PRESERVATIVE FREE 10 ML: 5 INJECTION INTRAVENOUS at 07:59

## 2020-08-01 RX ADMIN — Medication 250 MG: at 07:58

## 2020-08-01 RX ADMIN — ENOXAPARIN SODIUM 40 MG: 40 INJECTION SUBCUTANEOUS at 07:58

## 2020-08-01 RX ADMIN — ACETAMINOPHEN 650 MG: 325 TABLET ORAL at 18:17

## 2020-08-01 RX ADMIN — CALCIUM CARBONATE-CHOLECALCIFEROL TAB 250 MG-125 UNIT 250 MG: 250-125 TAB at 17:10

## 2020-08-01 RX ADMIN — CALCIUM CARBONATE-CHOLECALCIFEROL TAB 250 MG-125 UNIT 250 MG: 250-125 TAB at 07:58

## 2020-08-01 RX ADMIN — VENLAFAXINE HYDROCHLORIDE 150 MG: 75 CAPSULE, EXTENDED RELEASE ORAL at 07:58

## 2020-08-01 RX ADMIN — ANTACID TABLETS 500 MG: 500 TABLET, CHEWABLE ORAL at 16:14

## 2020-08-01 RX ADMIN — PANTOPRAZOLE SODIUM 40 MG: 40 TABLET, DELAYED RELEASE ORAL at 05:29

## 2020-08-01 ASSESSMENT — PAIN - FUNCTIONAL ASSESSMENT
PAIN_FUNCTIONAL_ASSESSMENT: PREVENTS OR INTERFERES SOME ACTIVE ACTIVITIES AND ADLS

## 2020-08-01 ASSESSMENT — PAIN DESCRIPTION - PAIN TYPE
TYPE: ACUTE PAIN

## 2020-08-01 ASSESSMENT — PAIN DESCRIPTION - PROGRESSION
CLINICAL_PROGRESSION: GRADUALLY WORSENING
CLINICAL_PROGRESSION: GRADUALLY WORSENING
CLINICAL_PROGRESSION: GRADUALLY IMPROVING
CLINICAL_PROGRESSION: GRADUALLY IMPROVING

## 2020-08-01 ASSESSMENT — PAIN SCALES - GENERAL
PAINLEVEL_OUTOF10: 0
PAINLEVEL_OUTOF10: 5
PAINLEVEL_OUTOF10: 2
PAINLEVEL_OUTOF10: 6
PAINLEVEL_OUTOF10: 2

## 2020-08-01 ASSESSMENT — PAIN DESCRIPTION - LOCATION
LOCATION: KNEE

## 2020-08-01 ASSESSMENT — PAIN DESCRIPTION - DESCRIPTORS
DESCRIPTORS: ACHING;DISCOMFORT

## 2020-08-01 ASSESSMENT — PAIN DESCRIPTION - FREQUENCY
FREQUENCY: INTERMITTENT

## 2020-08-01 ASSESSMENT — PAIN DESCRIPTION - ONSET
ONSET: ON-GOING

## 2020-08-01 ASSESSMENT — PAIN DESCRIPTION - ORIENTATION
ORIENTATION: LEFT

## 2020-08-01 NOTE — PROGRESS NOTES
[facility] Medicine Progress Note      Admit Date: 2020         Overnight Events: No    CC: F/U for Fall with knee laceration    HPI: The patient is a 82 yrs old female, who  has a past medical history of Asthma, CKD (chronic kidney disease), Depression, GERD (gastroesophageal reflux disease), Neuropathy, Osteoporosis, Pernicious anemia, and Scoliosis. She presented to [facility] ER following a fall at home. The patient had fallen asleep on the couch. She got up around 2 am on the date of admission and made her bed. Prior to going to bed she went to make sure all of her doors were locked. While reaching for the garage door she lost her balance. She was unable to get up. Her  came and called EMS. The patient was noted to have bleeding from her L knee due to a laceration. She was brought to [facility] ER for further treatment. Ortho was consulted and she was taken to the OR  for exploration and repair of laceration. She was seen by PT/OT. Interval History/Subjective: No new complaints. No new events. She does have 14 steps at home and is still having some knee pain. Review of Systems:     The patient denied headaches, visual changes, LOC, SOB, CP, ABD pain, N/V/D, skin changes, new or worsening weakness or neuromuscular deficits. She is still having a bit of knee pain. Comprehensive ROS negative except as mentioned above. Past Medical History:        Diagnosis Date    Asthma     CKD (chronic kidney disease)     Depression     GERD (gastroesophageal reflux disease)     Neuropathy     Osteoporosis     Pernicious anemia     Scoliosis        Past Surgical History:        Procedure Laterality Date     SECTION       SECTION      x`s    KNEE SURGERY Left 2020    INCISION AND DRAINAGE LEFT KNEE performed by Monika Smith MD at [address] [place] [address]:  [facility]    Past medical and surgical history reviewed. Any changes have been noted.      PHYSICAL EXAM:  /70   Pulse 56 Temp 98 °F (36.7 °C) (Oral)   Resp 16   Ht 5' 4\" (1.626 m)   Wt 163 lb 5.8 oz (74.1 kg)   SpO2 93%   BMI 28.04 kg/m²       Intake/Output Summary (Last 24 hours) at 8/1/2020 0853  Last data filed at 8/1/2020 0813  Gross per 24 hour   Intake 950 ml   Output 1650 ml   Net -700 ml       General: Alert and oriented. Resting in bed in no apparent distress. Pleasant and cooperative. HEENT: Normocephalic. Atraumatic. Pupils equal and reactive. EOM intact. Oral mucosa pink/moist/intact. Neck: Supple. Symmetrical. Trachea midline. Lungs: Clear to auscultation bilaterally. Respirations even and unlabored. Chest: Exam unremarkable. Cardiac: S1/S2 noted. Regular Rhythm and rate. Abdomen/GI: Soft. Non-tender. Non-distended. BS+. Extremities: PP+. No redness/cyanosis/edema noted. Brisk cap refill. ACE wrap to L knee. Skin: Dry and intact. No lesions noted, except as above. Neuro: Grossly intact. No focal deficits noted. LABS:    Lab Results   Component Value Date    WBC 5.7 07/31/2020    HGB 11.4 (L) 08/01/2020    HCT 33.6 (L) 08/01/2020    MCV 95.8 07/31/2020     07/31/2020    LYMPHOPCT 21.9 07/31/2020    RBC 3.76 (L) 07/31/2020    MCH 31.4 07/31/2020    MCHC 32.8 07/31/2020    RDW 13.7 07/31/2020       Lab Results   Component Value Date    CREATININE 1.0 07/31/2020    BUN 30 (H) 07/31/2020     07/31/2020    K 4.0 07/31/2020     07/31/2020    CO2 26 07/31/2020       No results found for: MG    Lab Results   Component Value Date    ALT 15 02/10/2018    AST 26 02/10/2018    ALKPHOS 62 02/10/2018    BILITOT 0.6 02/10/2018        No flowsheet data found. No results found for: LABA1C    Imaging:  XR KNEE LEFT (1-2 VIEWS)   Final Result   Prepatellar soft tissue defect, compatible with given history of laceration. Tricompartmental degenerative change.       Overlap of the medial tibial plateau and medial femoral condyle on the   frontal view is likely projectional.  If there is strong clinical suspicion   for tibial plateau fracture or patient is unable to bear weight, follow-up   may be considered. Scheduled and prn Medications:    Scheduled Meds:   anastrozole  1 mg Oral Daily    vitamin C  250 mg Oral Daily    oyster shell calcium/vitamin D  1 tablet Oral BID WC    [START ON 8/4/2020] vitamin D  50,000 Units Oral Weekly    budesonide-formoterol  2 puff Inhalation BID    pantoprazole  40 mg Oral QAM AC    triamterene-hydrochlorothiazide  1 tablet Oral Daily    venlafaxine  150 mg Oral Daily    sodium chloride flush  10 mL Intravenous 2 times per day    enoxaparin  40 mg Subcutaneous Daily    sennosides-docusate sodium  1 tablet Oral BID     Continuous Infusions:  PRN Meds:.ipratropium-albuterol, bismuth subsalicylate, clonazePAM, sodium chloride flush, acetaminophen **OR** acetaminophen, polyethylene glycol, promethazine **OR** ondansetron, magnesium hydroxide    Assessment & Plan:      L knee laceration  S/p OR exploration and repair by ortho on 7/31  PRN pain management  PT/OT    Fall at home  2/2 mechanical cause   Treat underlying cause  PT/OT    Asthma, not currently in exacerbation  Continue current medical regimen  Nebulizer treatments  Supportive therapies  Supplemental oxygen as needed (titrate to SpO2 88-92%)   Monitor for s/s of exacerbation    Chronic Kidney Disease III  Avoid nephrotoxic medications as able. Renally dose medications. Monitor for electrolyte abnormalities. Depression/anxiety  Continue current meds  Supportive therapies    Gerd  PPI    Hx of breast cancer  On arimidex  Follows with Dr. Hernandez Galindo current regimen/therapies. Monitor. Adjust medical regimen as appropriate. Body mass index is 28.04 kg/m². The patient and / or the family were informed of the results of any tests, a time was given to answer questions, a plan was proposed and they agreed with plan.     DVT prophylaxis: [x] Lovenox  [] SQ Heparin  [] SCDs

## 2020-08-01 NOTE — CARE COORDINATION
Referral to [address] [place] [address] received. No bed is available until Tuesday and anticipate patient will likely be ready for discharge prior to this date unless she is worked up for new complaint of frequent urination.

## 2020-08-01 NOTE — PLAN OF CARE
Problem: Falls - Risk of:  Goal: Will remain free from falls  Description: Will remain free from falls  Outcome: Met This Shift  Note: Patient has been assessed for fall risk, fall precautions are in place, environment has been cleared of obstacles and reassessed during rounding, bed is in lowest positiion with the wheels locked, call light is within reach. ID band has been verified, appropriate transfer methods have been utilized and patient has been educated on safety   Goal: Absence of physical injury  Description: Absence of physical injury  Outcome: Met This Shift  Note: The patient has not had any physical injures this shift. Problem: Pain:  Goal: Pain level will decrease  Description: Pain level will decrease  Outcome: Met This Shift  Note: Patient has been assessed for pain on a regular bases, patient has been given pain medication as appropriate, patient has been reassessed for pain after medication has been administered  Goal: Control of acute pain  Description: Control of acute pain  Outcome: Met This Shift  Note: Patient is alert and oriented X4. Patient is able to identify the sensation of pain and communicate the need for pain medication appropriately. Goal: Control of chronic pain  Description: Control of chronic pain  Outcome: Met This Shift  Note: Patient reports chronic pain to her coccyx from a previous fall.  Pain controled with PRN pain medications

## 2020-08-01 NOTE — PROGRESS NOTES
Dr Fátima Hudson called to check on the patients progress this morning, he reported it is alright for her to be discharged per ortho

## 2020-08-01 NOTE — OP NOTE
[address] Darcy [place] [address]                                OPERATIVE REPORT    PATIENT NAME: Agnieszka Cuevas                      :        1935  MED REC NO:   [id number]                          ROOM:       3106  ACCOUNT NO:   [de-identified]                           ADMIT DATE: 2020  PROVIDER:     Kathy Busby MD      DATE OF PROCEDURE:  2020    PRIMARY CARE PHYSICIAN:  Miriam Guerrero MD    PREOPERATIVE DIAGNOSIS:  Left knee complex large traumatic  laceration, measuring 10 cm. POSTOPERATIVE DIAGNOSIS:  Left knee complex large traumatic  laceration, measuring 10 cm. OPERATION PERFORMED:  1. Left knee wound exploration with extension of the wound and  debridement and coagulation of subcutaneous bleeding. 2.  Complex repair, left knee, 10 cm. SURGEON:  Kathy Busby MD    ANESTHESIA:  General anesthesia. ESTIMATED BLOOD LOSS:  Less than 50 mL. COMPLICATIONS:  None. TOURNIQUET:  Left upper thigh, 350 mmHg. INDICATIONS:  This is an 43-year-old white female, still fairly active,  who was at home and sustained a fall with direct hit to the left knee. She was brought by EMS to [facility] ER, and she was found to have a  complex large laceration over the left knee. I was consulted for her  injury. All risks, benefits, and alternatives were discussed with the  patient. She agreed to proceed with surgical treatment. OPERATIVE PROCEDURE:  The patient's left knee was marked. She received  2 gm of Ancef IV preoperatively. The patient was then brought to the  operating room and underwent general anesthesia. A well-padded  tourniquet was placed, left upper thigh. The left lower extremity was  then prepped and draped in regular sterile routine fashion. A time-out  was called, confirming the patient's name, site, and procedure.     The left leg was elevated for a few minutes and tourniquet was inflated  to 350 mmHg. We then extended the incision both medially and laterally  and we used Pulsavac for thorough debridement. We then also used a #15  blade to remove any unviable soft tissue. The wound was found to be  deep over the lateral side but there was no violation of the knee joint  capsule. We used 3 L of normal saline mixed with gentamicin. At this  point, we were very satisfied with the cleaning and debridement of the  wound. At this point, we let the tourniquet down and we used cautery to control  the bleeding in the wound. At this point, we re-explored the wound and  we did not find any major active bleeding. We then turned attention toward the complex repair. We measured the  wound about 10 cm. We used 3-0 nylon in a transverse mattress fashion  to perform the repair. Dressing was then applied in the form of  Xeroform, 4x4, Kerlix, and Ace wrap. The patient tolerated the procedure well and was taken to the recovery  in stable condition. POSTOPERATIVE PLAN:  The patient will be admitted to the hospital given  her age and the complicated laceration. We will start IV antibiotic for  24 hours. We will start PT/OT with weightbearing as tolerated.         Tim Tinajero MD    D: 07/31/2020 20:34:03       T: 08/01/2020 1:14:07     [id number]  Job#: [id number]     Doc#: [id number]    CC:  Tramaine Tijerina MD

## 2020-08-01 NOTE — PROGRESS NOTES
Patient A&O in the bed. Patient tolerating PO intake well. PM medications given without complications. Patient denies nausea or vomiting. Pt complained of headache, prn tylenol administered per [facility], pt satisfied. Call light within reach, able to make needs known, fall precautions in place. Will monitor. Electronically signed by Erik Lunsford RN on 8/1/2020 at 12:06 AM

## 2020-08-01 NOTE — PROGRESS NOTES
[facility] Orthopedic Surgery  Progress Note        POD # 1, s/p I&D and repair left knee laceration. Pt comfortable, no c/o. Drsg left knee D/C/I,  Mild pain with left knee ROM, NVI    CBC:   Lab Results   Component Value Date    WBC 5.7 07/31/2020    RBC 3.76 07/31/2020    HGB 11.4 08/01/2020    HCT 33.6 08/01/2020    MCV 95.8 07/31/2020    MCH 31.4 07/31/2020    MCHC 32.8 07/31/2020    RDW 13.7 07/31/2020     07/31/2020    MPV 8.2 07/31/2020     PT/INR:    Lab Results   Component Value Date    PROTIME 10.9 07/31/2020    INR 0.94 07/31/2020     PTT:    Lab Results   Component Value Date    APTT 31.5 07/31/2020   [APTT    A/P: s/p  I&D and repair left knee laceration.  - Stable  - PT/OT, WBAT  - Ok to D/C to [facility] from ortho standpoint.  - F/U Dr Geraldine Eldridge in 2 weeks.       Radha Rollins MD 8/1/2020 3:21 PM

## 2020-08-02 LAB
ANION GAP SERPL CALCULATED.3IONS-SCNC: 10 MMOL/L (ref 3–16)
BASOPHILS ABSOLUTE: 0 K/UL (ref 0–0.2)
BASOPHILS RELATIVE PERCENT: 0.5 %
BUN BLDV-MCNC: 18 MG/DL (ref 7–20)
CALCIUM SERPL-MCNC: 11.2 MG/DL (ref 8.3–10.6)
CHLORIDE BLD-SCNC: 100 MMOL/L (ref 99–110)
CO2: 30 MMOL/L (ref 21–32)
CREAT SERPL-MCNC: 1.1 MG/DL (ref 0.6–1.2)
EOSINOPHILS ABSOLUTE: 0.1 K/UL (ref 0–0.6)
EOSINOPHILS RELATIVE PERCENT: 2.8 %
GFR AFRICAN AMERICAN: 57
GFR NON-AFRICAN AMERICAN: 47
GLUCOSE BLD-MCNC: 106 MG/DL (ref 70–99)
HCT VFR BLD CALC: 33 % (ref 36–48)
HEMOGLOBIN: 11.2 G/DL (ref 12–16)
LYMPHOCYTES ABSOLUTE: 1.2 K/UL (ref 1–5.1)
LYMPHOCYTES RELATIVE PERCENT: 23.7 %
MCH RBC QN AUTO: 32.2 PG (ref 26–34)
MCHC RBC AUTO-ENTMCNC: 33.9 G/DL (ref 31–36)
MCV RBC AUTO: 95 FL (ref 80–100)
MONOCYTES ABSOLUTE: 0.6 K/UL (ref 0–1.3)
MONOCYTES RELATIVE PERCENT: 12.7 %
NEUTROPHILS ABSOLUTE: 3 K/UL (ref 1.7–7.7)
NEUTROPHILS RELATIVE PERCENT: 60.3 %
PDW BLD-RTO: 13.3 % (ref 12.4–15.4)
PLATELET # BLD: 170 K/UL (ref 135–450)
PMV BLD AUTO: 8.1 FL (ref 5–10.5)
POTASSIUM REFLEX MAGNESIUM: 4.3 MMOL/L (ref 3.5–5.1)
RBC # BLD: 3.48 M/UL (ref 4–5.2)
SODIUM BLD-SCNC: 140 MMOL/L (ref 136–145)
WBC # BLD: 5.1 K/UL (ref 4–11)

## 2020-08-02 PROCEDURE — 85025 COMPLETE CBC W/AUTO DIFF WBC: CPT

## 2020-08-02 PROCEDURE — 36415 COLL VENOUS BLD VENIPUNCTURE: CPT

## 2020-08-02 PROCEDURE — 94760 N-INVAS EAR/PLS OXIMETRY 1: CPT

## 2020-08-02 PROCEDURE — 94640 AIRWAY INHALATION TREATMENT: CPT

## 2020-08-02 PROCEDURE — 1200000000 HC SEMI PRIVATE

## 2020-08-02 PROCEDURE — 6370000000 HC RX 637 (ALT 250 FOR IP): Performed by: ORTHOPAEDIC SURGERY

## 2020-08-02 PROCEDURE — 2580000003 HC RX 258: Performed by: INTERNAL MEDICINE

## 2020-08-02 PROCEDURE — 6370000000 HC RX 637 (ALT 250 FOR IP): Performed by: INTERNAL MEDICINE

## 2020-08-02 PROCEDURE — 80048 BASIC METABOLIC PNL TOTAL CA: CPT

## 2020-08-02 PROCEDURE — 6360000002 HC RX W HCPCS: Performed by: INTERNAL MEDICINE

## 2020-08-02 RX ADMIN — BUDESONIDE AND FORMOTEROL FUMARATE DIHYDRATE 2 PUFF: 160; 4.5 AEROSOL RESPIRATORY (INHALATION) at 08:13

## 2020-08-02 RX ADMIN — TRIAMTERENE AND HYDROCHLOROTHIAZIDE 1 TABLET: 75; 50 TABLET ORAL at 09:14

## 2020-08-02 RX ADMIN — ENOXAPARIN SODIUM 40 MG: 40 INJECTION SUBCUTANEOUS at 09:14

## 2020-08-02 RX ADMIN — DOCUSATE SODIUM 50 MG AND SENNOSIDES 8.6 MG 1 TABLET: 8.6; 5 TABLET, FILM COATED ORAL at 21:16

## 2020-08-02 RX ADMIN — CALCIUM CARBONATE-CHOLECALCIFEROL TAB 250 MG-125 UNIT 250 MG: 250-125 TAB at 16:32

## 2020-08-02 RX ADMIN — PANTOPRAZOLE SODIUM 40 MG: 40 TABLET, DELAYED RELEASE ORAL at 05:46

## 2020-08-02 RX ADMIN — ACETAMINOPHEN 650 MG: 325 TABLET ORAL at 18:00

## 2020-08-02 RX ADMIN — BUDESONIDE AND FORMOTEROL FUMARATE DIHYDRATE 2 PUFF: 160; 4.5 AEROSOL RESPIRATORY (INHALATION) at 20:01

## 2020-08-02 RX ADMIN — ACETAMINOPHEN 650 MG: 325 TABLET ORAL at 09:13

## 2020-08-02 RX ADMIN — ANASTROZOLE 1 MG: 1 TABLET ORAL at 09:13

## 2020-08-02 RX ADMIN — SODIUM CHLORIDE, PRESERVATIVE FREE 10 ML: 5 INJECTION INTRAVENOUS at 09:15

## 2020-08-02 RX ADMIN — CALCIUM CARBONATE-CHOLECALCIFEROL TAB 250 MG-125 UNIT 250 MG: 250-125 TAB at 09:17

## 2020-08-02 RX ADMIN — SODIUM CHLORIDE, PRESERVATIVE FREE 10 ML: 5 INJECTION INTRAVENOUS at 21:17

## 2020-08-02 RX ADMIN — VENLAFAXINE HYDROCHLORIDE 150 MG: 75 CAPSULE, EXTENDED RELEASE ORAL at 09:13

## 2020-08-02 RX ADMIN — Medication 250 MG: at 09:13

## 2020-08-02 RX ADMIN — DOCUSATE SODIUM 50 MG AND SENNOSIDES 8.6 MG 1 TABLET: 8.6; 5 TABLET, FILM COATED ORAL at 09:12

## 2020-08-02 ASSESSMENT — PAIN DESCRIPTION - DESCRIPTORS
DESCRIPTORS: ACHING;DISCOMFORT

## 2020-08-02 ASSESSMENT — PAIN DESCRIPTION - PAIN TYPE
TYPE: ACUTE PAIN

## 2020-08-02 ASSESSMENT — PAIN DESCRIPTION - PROGRESSION
CLINICAL_PROGRESSION: GRADUALLY IMPROVING
CLINICAL_PROGRESSION: GRADUALLY IMPROVING
CLINICAL_PROGRESSION: GRADUALLY WORSENING
CLINICAL_PROGRESSION: GRADUALLY WORSENING

## 2020-08-02 ASSESSMENT — PAIN DESCRIPTION - FREQUENCY
FREQUENCY: CONTINUOUS
FREQUENCY: INTERMITTENT
FREQUENCY: INTERMITTENT
FREQUENCY: CONTINUOUS

## 2020-08-02 ASSESSMENT — PAIN DESCRIPTION - ORIENTATION
ORIENTATION: LEFT

## 2020-08-02 ASSESSMENT — PAIN SCALES - GENERAL
PAINLEVEL_OUTOF10: 2
PAINLEVEL_OUTOF10: 5
PAINLEVEL_OUTOF10: 5
PAINLEVEL_OUTOF10: 2

## 2020-08-02 ASSESSMENT — PAIN DESCRIPTION - LOCATION
LOCATION: KNEE

## 2020-08-02 ASSESSMENT — PAIN - FUNCTIONAL ASSESSMENT
PAIN_FUNCTIONAL_ASSESSMENT: PREVENTS OR INTERFERES SOME ACTIVE ACTIVITIES AND ADLS

## 2020-08-02 ASSESSMENT — PAIN DESCRIPTION - ONSET
ONSET: ON-GOING

## 2020-08-02 NOTE — PROGRESS NOTES
Patient A&O in the bed Patient tolerating PO intake well. PM medications given without complications. Patient denies pain, nausea or vomiting. Call light within reach, able to make needs known, fall precautions in place. Will monitor. Electronically signed by China Issa RN on 8/1/2020 at 11:21 PM

## 2020-08-02 NOTE — PROGRESS NOTES
Patient in bed, she is alert and oriented x4. She has been up to the chair most all day. She denies nausea, vomiting. Pain well controled with tylenol. Vitals stable. Call light in reach.

## 2020-08-02 NOTE — PROGRESS NOTES
Hospital Medicine Progress Note      Admit Date: 2020         Overnight Events: No    CC: F/U for Fall with knee laceration    HPI: The patient is a 82 yrs old female, who  has a past medical history of Asthma, CKD (chronic kidney disease), Depression, GERD (gastroesophageal reflux disease), Neuropathy, Osteoporosis, Pernicious anemia, and Scoliosis. She presented to [facility] ER following a fall at home. The patient had fallen asleep on the couch. She got up around 2 am on the date of admission and made her bed. Prior to going to bed she went to make sure all of her doors were locked. While reaching for the garage door she lost her balance. She was unable to get up. Her  came and called EMS. The patient was noted to have bleeding from her L knee due to a laceration. She was brought to [facility] ER for further treatment. Ortho was consulted and she was taken to the OR  for exploration and repair of laceration. She was seen by PT/OT. Interval History/Subjective: No new complaints. No new events. Review of Systems:     The patient denied headaches, visual changes, LOC, SOB, CP, ABD pain, N/V/D, skin changes, new or worsening weakness or neuromuscular deficits. She is still having a bit of knee pain. Comprehensive ROS negative except as mentioned above. Past Medical History:        Diagnosis Date    Asthma     CKD (chronic kidney disease)     Depression     GERD (gastroesophageal reflux disease)     Neuropathy     Osteoporosis     Pernicious anemia     Scoliosis        Past Surgical History:        Procedure Laterality Date     SECTION       SECTION      x`s    KNEE SURGERY Left 2020    INCISION AND DRAINAGE LEFT KNEE performed by Radha Rollins MD at [address] [place] [address] Place:  [facility]    Past medical and surgical history reviewed. Any changes have been noted.      PHYSICAL EXAM:  /71   Pulse 63   Temp 98.1 °F (36.7 °C) (Oral)   Resp 18   Ht 5' 4\" (1.626 m) Wt 161 lb 9.6 oz (73.3 kg)   SpO2 95%   BMI 27.74 kg/m²       Intake/Output Summary (Last 24 hours) at 8/2/2020 0825  Last data filed at 8/2/2020 0341  Gross per 24 hour   Intake 480 ml   Output 1450 ml   Net -970 ml     General: Alert and oriented. Up at bedside in NAD. Pleasant and cooperative. HEENT: Normocephalic. Atraumatic. Pupils equal and reactive. EOM intact. Oral mucosa pink/moist/intact. Neck: Supple. Symmetrical. Trachea midline. Lungs: Clear to auscultation bilaterally. Respirations even and unlabored. Chest: Exam unremarkable. Cardiac: S1/S2 noted. Regular Rhythm and rate. Abdomen/GI: Soft. Non-tender. Non-distended. BS+. Extremities: PP+. No redness/cyanosis/edema noted. Brisk cap refill. ACE wrap to L knee. Skin: Dry and intact. No lesions noted, except as above. Neuro: Grossly intact. No focal deficits noted. LABS:    Lab Results   Component Value Date    WBC 5.1 08/02/2020    HGB 11.2 (L) 08/02/2020    HCT 33.0 (L) 08/02/2020    MCV 95.0 08/02/2020     08/02/2020    LYMPHOPCT 23.7 08/02/2020    RBC 3.48 (L) 08/02/2020    MCH 32.2 08/02/2020    MCHC 33.9 08/02/2020    RDW 13.3 08/02/2020       Lab Results   Component Value Date    CREATININE 1.1 08/02/2020    BUN 18 08/02/2020     08/02/2020    K 4.3 08/02/2020     08/02/2020    CO2 30 08/02/2020       No results found for: MG    Lab Results   Component Value Date    ALT 15 02/10/2018    AST 26 02/10/2018    ALKPHOS 62 02/10/2018    BILITOT 0.6 02/10/2018        No flowsheet data found. No results found for: LABA1C    Imaging:  XR KNEE LEFT (1-2 VIEWS)   Final Result   Prepatellar soft tissue defect, compatible with given history of laceration. Tricompartmental degenerative change.       Overlap of the medial tibial plateau and medial femoral condyle on the   frontal view is likely projectional.  If there is strong clinical suspicion   for tibial plateau fracture or patient is unable to bear weight, follow-up   may be considered. Scheduled and prn Medications:    Scheduled Meds:   anastrozole  1 mg Oral Daily    vitamin C  250 mg Oral Daily    oyster shell calcium/vitamin D  1 tablet Oral BID WC    [START ON 8/4/2020] vitamin D  50,000 Units Oral Weekly    budesonide-formoterol  2 puff Inhalation BID    pantoprazole  40 mg Oral QAM AC    triamterene-hydrochlorothiazide  1 tablet Oral Daily    venlafaxine  150 mg Oral Daily    sodium chloride flush  10 mL Intravenous 2 times per day    enoxaparin  40 mg Subcutaneous Daily    sennosides-docusate sodium  1 tablet Oral BID     Continuous Infusions:  PRN Meds:.calcium carbonate, ipratropium-albuterol, bismuth subsalicylate, clonazePAM, sodium chloride flush, acetaminophen **OR** acetaminophen, polyethylene glycol, promethazine **OR** ondansetron, magnesium hydroxide    Assessment & Plan:        L knee laceration  S/p OR exploration and repair by ortho on 7/31  PRN pain management  PT/OT  - The patient and her  have a chairlift at home, however it is not functioning. Her  is contacting somebody to hopefully have this fixed on Monday so she can safely return home if possible. Fall at home  2/2 mechanical cause   Treat underlying cause  PT/OT    Asthma, not currently in exacerbation  Continue current medical regimen  Nebulizer treatments  Supportive therapies  Supplemental oxygen as needed (titrate to SpO2 88-92%)   Monitor for s/s of exacerbation    Chronic Kidney Disease III  Avoid nephrotoxic medications as able. Renally dose medications. Monitor for electrolyte abnormalities. Depression/anxiety  Continue current meds  Supportive therapies    Gerd  PPI    Hx of breast cancer  On arimidex  Follows with Dr. Benjy Ellis current regimen/therapies. Monitor. Adjust medical regimen as appropriate. Body mass index is 27.74 kg/m².     The patient and / or the family were informed of the results of any tests, a time was given to answer questions, a plan was proposed and they agreed with plan. DVT prophylaxis: [x] Lovenox  [] SQ Heparin  [] SCDs because of  [] warfarin/oral direct thrombin inhibitor [] Encourage ambulation    GI prophylaxis: [x] PPI/[id number]  [] not indicated    Probiotic if on abx: [] Yes [] No [x] Not Indicated    Diet: DIET GENERAL;  Dietary Nutrition Supplements: Standard High Calorie Oral Supplement    Consults:  IP CONSULT TO ORTHOPEDIC SURGERY  IP CONSULT TO PHYSICAL MEDICINE REHAB    Disposition:  [] Home  [] Home with home health [] Rehab [] Psych [] SNF  [] LTAC  [] Long term nursing home or group home [] Transfer to ICU  [] Transfer to PCU [x] Other: TBD    Code Status: Full Code    ELOS: TBD. Would like to return home but has 14 steps to get to her bedroom/bathroom. Does have a chairlift but it is broke.  is contacting somebody to hopefully have this fixed Monday.        LATASHA Everett NP  08/02/20

## 2020-08-02 NOTE — PROGRESS NOTES
Patient A&O. No complaints at this time. Shift uneventful. Call light within reach, able to make needs knows, fall precautions in place. Will continue to monitor. Electronically signed by Raj Vega RN on 8/2/2020 at 6:33 AM

## 2020-08-03 ENCOUNTER — HOSPITAL ENCOUNTER (INPATIENT)
Age: 85
LOS: 5 days | Discharge: HOME HEALTH | DRG: 949 | End: 2020-08-08
Attending: PHYSICAL MEDICINE & REHABILITATION | Admitting: PHYSICAL MEDICINE & REHABILITATION
Payer: MEDICARE

## 2020-08-03 VITALS
OXYGEN SATURATION: 92 % | DIASTOLIC BLOOD PRESSURE: 66 MMHG | WEIGHT: 161.6 LBS | TEMPERATURE: 98 F | BODY MASS INDEX: 27.59 KG/M2 | SYSTOLIC BLOOD PRESSURE: 126 MMHG | HEART RATE: 71 BPM | HEIGHT: 64 IN | RESPIRATION RATE: 16 BRPM

## 2020-08-03 LAB
ANION GAP SERPL CALCULATED.3IONS-SCNC: 7 MMOL/L (ref 3–16)
BASOPHILS ABSOLUTE: 0 K/UL (ref 0–0.2)
BASOPHILS RELATIVE PERCENT: 0.5 %
BUN BLDV-MCNC: 31 MG/DL (ref 7–20)
CALCIUM SERPL-MCNC: 11.6 MG/DL (ref 8.3–10.6)
CHLORIDE BLD-SCNC: 103 MMOL/L (ref 99–110)
CO2: 31 MMOL/L (ref 21–32)
CREAT SERPL-MCNC: 1.3 MG/DL (ref 0.6–1.2)
EOSINOPHILS ABSOLUTE: 0.2 K/UL (ref 0–0.6)
EOSINOPHILS RELATIVE PERCENT: 3.1 %
GFR AFRICAN AMERICAN: 47
GFR NON-AFRICAN AMERICAN: 39
GLUCOSE BLD-MCNC: 105 MG/DL (ref 70–99)
HCT VFR BLD CALC: 33.1 % (ref 36–48)
HEMOGLOBIN: 11.1 G/DL (ref 12–16)
LYMPHOCYTES ABSOLUTE: 1.3 K/UL (ref 1–5.1)
LYMPHOCYTES RELATIVE PERCENT: 23.1 %
MCH RBC QN AUTO: 31.9 PG (ref 26–34)
MCHC RBC AUTO-ENTMCNC: 33.4 G/DL (ref 31–36)
MCV RBC AUTO: 95.4 FL (ref 80–100)
MONOCYTES ABSOLUTE: 0.7 K/UL (ref 0–1.3)
MONOCYTES RELATIVE PERCENT: 11.9 %
NEUTROPHILS ABSOLUTE: 3.4 K/UL (ref 1.7–7.7)
NEUTROPHILS RELATIVE PERCENT: 61.4 %
PDW BLD-RTO: 13.7 % (ref 12.4–15.4)
PLATELET # BLD: 183 K/UL (ref 135–450)
PMV BLD AUTO: 8.4 FL (ref 5–10.5)
POTASSIUM REFLEX MAGNESIUM: 4.2 MMOL/L (ref 3.5–5.1)
RBC # BLD: 3.47 M/UL (ref 4–5.2)
SODIUM BLD-SCNC: 141 MMOL/L (ref 136–145)
WBC # BLD: 5.5 K/UL (ref 4–11)

## 2020-08-03 PROCEDURE — 6370000000 HC RX 637 (ALT 250 FOR IP): Performed by: ORTHOPAEDIC SURGERY

## 2020-08-03 PROCEDURE — 6370000000 HC RX 637 (ALT 250 FOR IP): Performed by: PHYSICAL MEDICINE & REHABILITATION

## 2020-08-03 PROCEDURE — 80048 BASIC METABOLIC PNL TOTAL CA: CPT

## 2020-08-03 PROCEDURE — 85025 COMPLETE CBC W/AUTO DIFF WBC: CPT

## 2020-08-03 PROCEDURE — 97530 THERAPEUTIC ACTIVITIES: CPT

## 2020-08-03 PROCEDURE — 97535 SELF CARE MNGMENT TRAINING: CPT

## 2020-08-03 PROCEDURE — 6360000002 HC RX W HCPCS: Performed by: INTERNAL MEDICINE

## 2020-08-03 PROCEDURE — 36415 COLL VENOUS BLD VENIPUNCTURE: CPT

## 2020-08-03 PROCEDURE — 6370000000 HC RX 637 (ALT 250 FOR IP): Performed by: INTERNAL MEDICINE

## 2020-08-03 PROCEDURE — 94640 AIRWAY INHALATION TREATMENT: CPT

## 2020-08-03 PROCEDURE — 94760 N-INVAS EAR/PLS OXIMETRY 1: CPT

## 2020-08-03 PROCEDURE — 1280000000 HC REHAB R&B

## 2020-08-03 RX ORDER — GABAPENTIN 100 MG/1
200 CAPSULE ORAL NIGHTLY
Status: DISCONTINUED | OUTPATIENT
Start: 2020-08-03 | End: 2020-08-08 | Stop reason: HOSPADM

## 2020-08-03 RX ORDER — CLONAZEPAM 0.5 MG/1
0.5 TABLET ORAL 2 TIMES DAILY PRN
Status: DISCONTINUED | OUTPATIENT
Start: 2020-08-03 | End: 2020-08-08 | Stop reason: HOSPADM

## 2020-08-03 RX ORDER — HYDRALAZINE HYDROCHLORIDE 25 MG/1
25 TABLET, FILM COATED ORAL EVERY 6 HOURS PRN
Status: DISCONTINUED | OUTPATIENT
Start: 2020-08-03 | End: 2020-08-08 | Stop reason: HOSPADM

## 2020-08-03 RX ORDER — PANTOPRAZOLE SODIUM 40 MG/1
40 TABLET, DELAYED RELEASE ORAL
Status: DISCONTINUED | OUTPATIENT
Start: 2020-08-04 | End: 2020-08-08 | Stop reason: HOSPADM

## 2020-08-03 RX ORDER — HYDRALAZINE HYDROCHLORIDE 25 MG/1
25 TABLET, FILM COATED ORAL EVERY 6 HOURS PRN
Status: CANCELLED | OUTPATIENT
Start: 2020-08-03

## 2020-08-03 RX ORDER — BISMUTH SUBSALICYLATE 262 MG/1
262 TABLET, CHEWABLE ORAL DAILY PRN
Status: CANCELLED | OUTPATIENT
Start: 2020-08-03

## 2020-08-03 RX ORDER — ACETAMINOPHEN 325 MG/1
650 TABLET ORAL EVERY 6 HOURS PRN
Status: DISCONTINUED | OUTPATIENT
Start: 2020-08-03 | End: 2020-08-08 | Stop reason: HOSPADM

## 2020-08-03 RX ORDER — ASCORBIC ACID 500 MG
250 TABLET ORAL DAILY
Status: DISCONTINUED | OUTPATIENT
Start: 2020-08-04 | End: 2020-08-08 | Stop reason: HOSPADM

## 2020-08-03 RX ORDER — POLYETHYLENE GLYCOL 3350 17 G/17G
17 POWDER, FOR SOLUTION ORAL DAILY PRN
Status: CANCELLED | OUTPATIENT
Start: 2020-08-03

## 2020-08-03 RX ORDER — LANOLIN ALCOHOL/MO/W.PET/CERES
3 CREAM (GRAM) TOPICAL NIGHTLY PRN
Status: CANCELLED | OUTPATIENT
Start: 2020-08-03

## 2020-08-03 RX ORDER — TRIAMTERENE AND HYDROCHLOROTHIAZIDE 75; 50 MG/1; MG/1
1 TABLET ORAL DAILY
Status: CANCELLED | OUTPATIENT
Start: 2020-08-04

## 2020-08-03 RX ORDER — CALCIUM CARBONATE/VITAMIN D3 250-3.125
1 TABLET ORAL 2 TIMES DAILY WITH MEALS
Status: DISCONTINUED | OUTPATIENT
Start: 2020-08-03 | End: 2020-08-08 | Stop reason: HOSPADM

## 2020-08-03 RX ORDER — CALCIUM CARBONATE/VITAMIN D3 250-3.125
1 TABLET ORAL 2 TIMES DAILY WITH MEALS
Status: CANCELLED | OUTPATIENT
Start: 2020-08-03

## 2020-08-03 RX ORDER — BISACODYL 10 MG
10 SUPPOSITORY, RECTAL RECTAL DAILY PRN
Status: CANCELLED | OUTPATIENT
Start: 2020-08-03

## 2020-08-03 RX ORDER — ONDANSETRON 2 MG/ML
4 INJECTION INTRAMUSCULAR; INTRAVENOUS EVERY 6 HOURS PRN
Status: CANCELLED | OUTPATIENT
Start: 2020-08-03

## 2020-08-03 RX ORDER — SENNA AND DOCUSATE SODIUM 50; 8.6 MG/1; MG/1
1 TABLET, FILM COATED ORAL 2 TIMES DAILY
Status: CANCELLED | OUTPATIENT
Start: 2020-08-03

## 2020-08-03 RX ORDER — CALCIUM CARBONATE 200(500)MG
500 TABLET,CHEWABLE ORAL 3 TIMES DAILY PRN
DISCHARGE
Start: 2020-08-03 | End: 2020-09-02

## 2020-08-03 RX ORDER — VENLAFAXINE HYDROCHLORIDE 75 MG/1
150 CAPSULE, EXTENDED RELEASE ORAL DAILY
Status: DISCONTINUED | OUTPATIENT
Start: 2020-08-04 | End: 2020-08-08 | Stop reason: HOSPADM

## 2020-08-03 RX ORDER — IPRATROPIUM BROMIDE AND ALBUTEROL SULFATE 2.5; .5 MG/3ML; MG/3ML
1 SOLUTION RESPIRATORY (INHALATION) EVERY 4 HOURS PRN
Status: CANCELLED | OUTPATIENT
Start: 2020-08-03

## 2020-08-03 RX ORDER — ONDANSETRON 2 MG/ML
4 INJECTION INTRAMUSCULAR; INTRAVENOUS EVERY 6 HOURS PRN
Status: DISCONTINUED | OUTPATIENT
Start: 2020-08-03 | End: 2020-08-08 | Stop reason: HOSPADM

## 2020-08-03 RX ORDER — ANASTROZOLE 1 MG/1
1 TABLET ORAL DAILY
Status: CANCELLED | OUTPATIENT
Start: 2020-08-04

## 2020-08-03 RX ORDER — TRIAMTERENE AND HYDROCHLOROTHIAZIDE 37.5; 25 MG/1; MG/1
2 TABLET ORAL DAILY
Status: DISCONTINUED | OUTPATIENT
Start: 2020-08-04 | End: 2020-08-08 | Stop reason: HOSPADM

## 2020-08-03 RX ORDER — CLONAZEPAM 0.5 MG/1
0.5 TABLET ORAL 2 TIMES DAILY PRN
Status: CANCELLED | OUTPATIENT
Start: 2020-08-03

## 2020-08-03 RX ORDER — GABAPENTIN 100 MG/1
200 CAPSULE ORAL NIGHTLY
COMMUNITY
Start: 2020-07-23 | End: 2021-11-02

## 2020-08-03 RX ORDER — BISACODYL 10 MG
10 SUPPOSITORY, RECTAL RECTAL DAILY PRN
Status: DISCONTINUED | OUTPATIENT
Start: 2020-08-03 | End: 2020-08-08 | Stop reason: HOSPADM

## 2020-08-03 RX ORDER — ERGOCALCIFEROL 1.25 MG/1
50000 CAPSULE ORAL WEEKLY
Status: CANCELLED | OUTPATIENT
Start: 2020-08-04

## 2020-08-03 RX ORDER — SENNA AND DOCUSATE SODIUM 50; 8.6 MG/1; MG/1
1 TABLET, FILM COATED ORAL 2 TIMES DAILY
Status: DISCONTINUED | OUTPATIENT
Start: 2020-08-03 | End: 2020-08-08 | Stop reason: HOSPADM

## 2020-08-03 RX ORDER — PROMETHAZINE HYDROCHLORIDE 25 MG/1
12.5 TABLET ORAL EVERY 6 HOURS PRN
Status: DISCONTINUED | OUTPATIENT
Start: 2020-08-03 | End: 2020-08-08 | Stop reason: HOSPADM

## 2020-08-03 RX ORDER — POLYETHYLENE GLYCOL 3350 17 G/17G
17 POWDER, FOR SOLUTION ORAL DAILY PRN
Status: DISCONTINUED | OUTPATIENT
Start: 2020-08-03 | End: 2020-08-08 | Stop reason: HOSPADM

## 2020-08-03 RX ORDER — PANTOPRAZOLE SODIUM 40 MG/1
40 TABLET, DELAYED RELEASE ORAL
Status: CANCELLED | OUTPATIENT
Start: 2020-08-04

## 2020-08-03 RX ORDER — BUDESONIDE AND FORMOTEROL FUMARATE DIHYDRATE 160; 4.5 UG/1; UG/1
2 AEROSOL RESPIRATORY (INHALATION) 2 TIMES DAILY
Status: CANCELLED | OUTPATIENT
Start: 2020-08-03

## 2020-08-03 RX ORDER — BISMUTH SUBSALICYLATE 262 MG/1
262 TABLET, CHEWABLE ORAL DAILY PRN
Status: DISCONTINUED | OUTPATIENT
Start: 2020-08-03 | End: 2020-08-08 | Stop reason: HOSPADM

## 2020-08-03 RX ORDER — PROMETHAZINE HYDROCHLORIDE 25 MG/1
12.5 TABLET ORAL EVERY 6 HOURS PRN
Status: CANCELLED | OUTPATIENT
Start: 2020-08-03

## 2020-08-03 RX ORDER — VENLAFAXINE HYDROCHLORIDE 75 MG/1
150 CAPSULE, EXTENDED RELEASE ORAL DAILY
Status: CANCELLED | OUTPATIENT
Start: 2020-08-04

## 2020-08-03 RX ORDER — ASCORBIC ACID 500 MG
250 TABLET ORAL DAILY
Status: CANCELLED | OUTPATIENT
Start: 2020-08-04

## 2020-08-03 RX ORDER — IPRATROPIUM BROMIDE AND ALBUTEROL SULFATE 2.5; .5 MG/3ML; MG/3ML
1 SOLUTION RESPIRATORY (INHALATION) EVERY 4 HOURS PRN
Status: DISCONTINUED | OUTPATIENT
Start: 2020-08-03 | End: 2020-08-08 | Stop reason: HOSPADM

## 2020-08-03 RX ORDER — ACETAMINOPHEN 325 MG/1
650 TABLET ORAL EVERY 6 HOURS PRN
Status: CANCELLED | OUTPATIENT
Start: 2020-08-03

## 2020-08-03 RX ORDER — LANOLIN ALCOHOL/MO/W.PET/CERES
3 CREAM (GRAM) TOPICAL NIGHTLY PRN
Status: DISCONTINUED | OUTPATIENT
Start: 2020-08-03 | End: 2020-08-08 | Stop reason: HOSPADM

## 2020-08-03 RX ORDER — ERGOCALCIFEROL 1.25 MG/1
50000 CAPSULE ORAL WEEKLY
Status: DISCONTINUED | OUTPATIENT
Start: 2020-08-04 | End: 2020-08-08 | Stop reason: HOSPADM

## 2020-08-03 RX ORDER — CALCIUM CARBONATE 200(500)MG
500 TABLET,CHEWABLE ORAL 3 TIMES DAILY PRN
Status: DISCONTINUED | OUTPATIENT
Start: 2020-08-03 | End: 2020-08-08 | Stop reason: HOSPADM

## 2020-08-03 RX ORDER — GABAPENTIN 100 MG/1
200 CAPSULE ORAL NIGHTLY
Status: CANCELLED | OUTPATIENT
Start: 2020-08-03

## 2020-08-03 RX ORDER — ANASTROZOLE 1 MG/1
1 TABLET ORAL DAILY
Status: DISCONTINUED | OUTPATIENT
Start: 2020-08-04 | End: 2020-08-08 | Stop reason: HOSPADM

## 2020-08-03 RX ORDER — CALCIUM CARBONATE 200(500)MG
500 TABLET,CHEWABLE ORAL 3 TIMES DAILY PRN
Status: CANCELLED | OUTPATIENT
Start: 2020-08-03

## 2020-08-03 RX ORDER — BUDESONIDE AND FORMOTEROL FUMARATE DIHYDRATE 160; 4.5 UG/1; UG/1
2 AEROSOL RESPIRATORY (INHALATION) 2 TIMES DAILY
Status: DISCONTINUED | OUTPATIENT
Start: 2020-08-03 | End: 2020-08-08 | Stop reason: HOSPADM

## 2020-08-03 RX ADMIN — CALCIUM CARBONATE-CHOLECALCIFEROL TAB 250 MG-125 UNIT 250 MG: 250-125 TAB at 21:27

## 2020-08-03 RX ADMIN — CALCIUM CARBONATE-CHOLECALCIFEROL TAB 250 MG-125 UNIT 250 MG: 250-125 TAB at 09:02

## 2020-08-03 RX ADMIN — PANTOPRAZOLE SODIUM 40 MG: 40 TABLET, DELAYED RELEASE ORAL at 05:14

## 2020-08-03 RX ADMIN — BUDESONIDE AND FORMOTEROL FUMARATE DIHYDRATE 2 PUFF: 160; 4.5 AEROSOL RESPIRATORY (INHALATION) at 20:26

## 2020-08-03 RX ADMIN — ANASTROZOLE 1 MG: 1 TABLET ORAL at 09:02

## 2020-08-03 RX ADMIN — DOCUSATE SODIUM 50 MG AND SENNOSIDES 8.6 MG 1 TABLET: 8.6; 5 TABLET, FILM COATED ORAL at 09:01

## 2020-08-03 RX ADMIN — VENLAFAXINE HYDROCHLORIDE 150 MG: 75 CAPSULE, EXTENDED RELEASE ORAL at 09:01

## 2020-08-03 RX ADMIN — BUDESONIDE AND FORMOTEROL FUMARATE DIHYDRATE 2 PUFF: 160; 4.5 AEROSOL RESPIRATORY (INHALATION) at 09:03

## 2020-08-03 RX ADMIN — ENOXAPARIN SODIUM 40 MG: 40 INJECTION SUBCUTANEOUS at 09:02

## 2020-08-03 RX ADMIN — TRIAMTERENE AND HYDROCHLOROTHIAZIDE 1 TABLET: 75; 50 TABLET ORAL at 09:02

## 2020-08-03 RX ADMIN — Medication 250 MG: at 09:02

## 2020-08-03 RX ADMIN — GABAPENTIN 200 MG: 100 CAPSULE ORAL at 21:18

## 2020-08-03 RX ADMIN — CLONAZEPAM 0.5 MG: 0.5 TABLET ORAL at 10:59

## 2020-08-03 RX ADMIN — DOCUSATE SODIUM 50 MG AND SENNOSIDES 8.6 MG 1 TABLET: 8.6; 5 TABLET, FILM COATED ORAL at 21:19

## 2020-08-03 RX ADMIN — POLYETHYLENE GLYCOL 3350 17 G: 17 POWDER, FOR SOLUTION ORAL at 09:02

## 2020-08-03 ASSESSMENT — PAIN SCALES - WONG BAKER
WONGBAKER_NUMERICALRESPONSE: 0

## 2020-08-03 ASSESSMENT — PAIN SCALES - GENERAL
PAINLEVEL_OUTOF10: 0

## 2020-08-03 NOTE — CARE COORDINATION
Sw received notice from [facility] on acute rehab--they can accept the patient. Patient will discuss with her spouse and let know decision.     Electronically signed by Joe Kent on 8/3/2020 at 12:45 PM

## 2020-08-03 NOTE — PROGRESS NOTES
Devices  Safety Devices in place: Yes  Type of devices: Nurse notified;Gait belt;Call light within reach; Chair alarm in place; Left in chair         Patient Diagnosis(es): The encounter diagnosis was Laceration of knee with complication, initial encounter. has a past medical history of Asthma, CKD (chronic kidney disease), Depression, GERD (gastroesophageal reflux disease), Neuropathy, Osteoporosis, Pernicious anemia, and Scoliosis. has a past surgical history that includes  section;  section; and knee surgery (Left, 2020). Restrictions  Restrictions/Precautions  Restrictions/Precautions: Fall Risk, Weight Bearing  Lower Extremity Weight Bearing Restrictions  Left Lower Extremity Weight Bearing: Weight Bearing As Tolerated  Position Activity Restriction  Other position/activity restrictions: Per Dr. Sergo Helm consult note 2020: \"Ms. Bajwa 82 y.o.  female presents today to [facility] ED via EMS for evaluation of a left knee injury which occurred when she was at home and fell on her left knee with open large laceration. \"  Subjective   General  Chart Reviewed: Yes  Patient assessed for rehabilitation services?: Yes  Additional Pertinent Hx: Per Dr. Sergo Helm consult note 2020: \"Ms. Bajwa 82 y.o.  female presents today to [facility] ED via EMS for evaluation of a left knee injury which occurred when she was at home and fell on her left knee with open large laceration. \"  Response to previous treatment: Patient with no complaints from previous session  Family / Caregiver Present: No  Referring Practitioner: Sergo Helm MD  Diagnosis: L knee laceration  Subjective  Subjective: Patient supine in bed upon arrival to room. Patient agreeable to OT.       Orientation  Orientation  Overall Orientation Status: Within Functional Limits  Objective    ADL  Grooming: Contact guard assistance(Standing at sink to wash hands and brush teeth)  LE Dressing: Minimal assistance(Patient required assistance to thread underwear in LLE. Able to pull underwear up)  Toileting: Contact guard assistance(completed nkechi care sitting on commode. Assistance with brief and gown management)  Additional Comments: Patient CGA for tolieting and grooming. Patient min A with LE dressing. Balance  Sitting Balance: Stand by assistance  Standing Balance: Contact guard assistance  Standing Balance  Activity: standing at sink to completet ADL tasks  Functional Mobility  Functional - Mobility Device: Rolling Walker  Activity: To/from bathroom  Assist Level: Contact guard assistance  Functional Mobility Comments: Patient completed functional mobility using a RW with CGA, with a slow and steady gait. Patient required cues for RW management. Toilet Transfers  Toilet - Technique: Ambulating  Equipment Used: Grab bars(comfort height commode)  Toilet Transfer: Contact guard assistance  Toilet Transfers Comments: Patient completed toliet transfers with CGA using a RW and requires cues for hand placement and kicking out LLE. Bed mobility  Supine to Sit: Stand by assistance(HOB elevated and use of hand rails to assist)  Sit to Supine: Unable to assess(seated in recliner chair at the end of session)  Transfers  Sit to stand: Contact guard assistance  Stand to sit: Contact guard assistance  Transfer Comments: Patient CGA with sit<>stand from edge of bed to recliner chair using a RW. Patient requires cues for safe hand placement. Patient requires min A from lower surfaces.         Cognition  Overall Cognitive Status: [facility]        Plan   Plan  Times per week: 3-5  Current Treatment Recommendations: Strengthening, Endurance Training, Balance Training, Functional Mobility Training, Safety Education & Training, Equipment Evaluation, Education, & procurement, Patient/Caregiver Education & Training, Self-Care / ADL    AM-PAC Score        ADL Inpatient CMS G-Code Modifier : CK (08/03/20 0830)    Goals  Short term goals  Time Frame for Short

## 2020-08-03 NOTE — PROGRESS NOTES
[facility] Orthopedic Surgery   Progress Note      S/P :  SUBJECTIVE  Up in chair. Alert and oriented. . Pain is   described in left knee and with the intensity of mild. Pain is described as aching, burning. Able to walk well with walker per staff. Was up to BR this AM.       OBJECTIVE              Physical                      VITALS:  /76   Pulse 74   Temp 97.9 °F (36.6 °C) (Oral)   Resp 16   Ht 5' 4\" (1.626 m)   Wt 161 lb 9.6 oz (73.3 kg)   SpO2 95%   BMI 27.74 kg/m²                     MUSCULOSKELETAL:  left foot NVI. Wiggles toes to command. Pedal pulses are palpable. NEUROLOGIC:                                  Sensory:  Touch:  Left Lower Extremity:  normal                                                 Surgical wound appears with old dark red drainage on dressing left knee. Left knee dressing changed. Noted intact sutures on laceration with intense deep bruising left anterior knee and proximal shin. No odor or purulence. Light active bleeding left knee wound noted. Dressed with Mepilex AG dressing then ACE for light pressure and support.      Data       CBC:   Lab Results   Component Value Date    WBC 5.5 08/03/2020    RBC 3.47 08/03/2020    HGB 11.1 08/03/2020    HCT 33.1 08/03/2020    MCV 95.4 08/03/2020    MCH 31.9 08/03/2020    MCHC 33.4 08/03/2020    RDW 13.7 08/03/2020     08/03/2020    MPV 8.4 08/03/2020        WBC:    Lab Results   Component Value Date    WBC 5.5 08/03/2020        Hemoglobin/Hematocrit:    Lab Results   Component Value Date    HGB 11.1 08/03/2020    HCT 33.1 08/03/2020        PT/INR:    Lab Results   Component Value Date    PROTIME 10.9 07/31/2020    INR 0.94 07/31/2020              Current Inpatient Medications             Current Facility-Administered Medications: calcium carbonate (TUMS) chewable tablet 500 mg, 500 mg, Oral, TID PRN  ipratropium-albuterol (DUONEB) nebulizer solution 1 ampule, 1 ampule, Inhalation, Q4H PRN  anastrozole (ARIMIDEX) tablet 1 mg, 1 mg, Oral, Daily  vitamin C (ASCORBIC ACID) tablet 250 mg, 250 mg, Oral, Daily  bismuth subsalicylate (PEPTO BISMOL) chewable tablet 262 mg, 262 mg, Oral, Daily PRN  oyster shell calcium/vitamin D 250-125 MG-UNIT per tablet 250 mg, 1 tablet, Oral, BID WC  clonazePAM (KLONOPIN) tablet 0.5 mg, 0.5 mg, Oral, BID PRN  [START ON 8/4/2020] vitamin D (ERGOCALCIFEROL) capsule 50,000 Units, 50,000 Units, Oral, Weekly  budesonide-formoterol (SYMBICORT) 160-4.5 MCG/ACT inhaler 2 puff, 2 puff, Inhalation, BID  pantoprazole (PROTONIX) tablet 40 mg, 40 mg, Oral, QAM AC  triamterene-hydrochlorothiazide (MAXZIDE) 75-50 MG per tablet 1 tablet, 1 tablet, Oral, Daily  venlafaxine (EFFEXOR XR) extended release capsule 150 mg, 150 mg, Oral, Daily  sodium chloride flush 0.9 % injection 10 mL, 10 mL, Intravenous, 2 times per day  sodium chloride flush 0.9 % injection 10 mL, 10 mL, Intravenous, PRN  acetaminophen (TYLENOL) tablet 650 mg, 650 mg, Oral, Q6H PRN **OR** acetaminophen (TYLENOL) suppository 650 mg, 650 mg, Rectal, Q6H PRN  polyethylene glycol (GLYCOLAX) packet 17 g, 17 g, Oral, Daily PRN  promethazine (PHENERGAN) tablet 12.5 mg, 12.5 mg, Oral, Q6H PRN **OR** ondansetron (ZOFRAN) injection 4 mg, 4 mg, Intravenous, Q6H PRN  enoxaparin (LOVENOX) injection 40 mg, 40 mg, Subcutaneous, Daily  sennosides-docusate sodium (SENOKOT-S) 8.6-50 MG tablet 1 tablet, 1 tablet, Oral, BID  magnesium hydroxide (MILK OF MAGNESIA) 400 MG/5ML suspension 30 mL, 30 mL, Oral, Daily PRN    ASSESSMENT AND PLAN    Fall  Left knee laceration  Post left knee I and D and laceration repair per Dr Gopi Puckett, stable exam  PT OT seeing. WBAT  Keep left knee Mepilex on for 7 days then wound to air. May shower. Rewrap ACE daily  FU Dr Anuradha Nguyen in office in 2 weeks.    Stable for DC per gama Mckeon  8/3/2020  10:46 AM

## 2020-08-03 NOTE — PLAN OF CARE
Problem: Falls - Risk of:  Goal: Will remain free from falls  Description: Will remain free from falls  8/3/2020 0719 by Sherryle Burlington, RN  Outcome: Ongoing  Note: Fall risk assessment completed. Fall precautions in place. Call light within reach. Pt educated on calling for assistance before getting up. Walkway free of clutter. Patient bed alarm intact, encouraged patient to call for assistance especially if feeling dizzy, SOB, or weakness. Will continue to monitor. Electronically signed by Sherryle Burlington, RN on 8/3/2020 at 7:19 AM      8/2/2020 2352 by Joselito Jaeger RN  Outcome: Ongoing  Note: Patient educated on fall prevention. Call light is within reach, bed locked in lowest position, personal items within reach, and bed alarm is on. Will round on patient per unit guidelines. 8/2/2020 1808 by Kaushal Rodriguez RN  Outcome: Met This Shift  Goal: Absence of physical injury  Description: Absence of physical injury  8/3/2020 0719 by Sherryle Burlington, RN  Outcome: Ongoing  8/2/2020 2352 by Joselito Jaeger RN  Outcome: Ongoing  Note: Pt is free of injury. No injury noted. Fall precautions in place. Call light within reach. Will monitor. 8/2/2020 1808 by Kaushal Rodriguez RN  Outcome: Met This Shift     Problem: Pain:  Goal: Pain level will decrease  Description: Pain level will decrease  8/3/2020 0719 by Sherryle Burlington, RN  Outcome: Ongoing  Note: Pt assessed for pain. Pt in 0/10 pain and assessed with 0-10 pain rating scale. Pt declined prescribed analgesic for pain, states pain is tolerable. (See eMar) Pt satisfied with pain relief thus far. Will reassess and continue to monitor. Electronically signed by Sherryle Burlington, RN on 8/3/2020 at 7:19 AM      8/2/2020 2352 by Joselito Jaeger RN  Outcome: Ongoing  Note: Pain /discomfort being managed with PRN analgesics per MD orders. Patient able to express presence and absence of pain and rate pain appropriately using numerical scale.      8/2/2020 1808 by Ajit Vargas Wiley Osler, RN  Outcome: Met This Shift  Goal: Control of acute pain  Description: Control of acute pain  8/3/2020 0719 by Trung Wilson RN  Outcome: Ongoing  8/2/2020 2352 by Laurence Blank RN  Outcome: Ongoing  Note: Pain /discomfort being managed with PRN analgesics per MD orders. Patient able to express presence and absence of pain and rate pain appropriately using numerical scale.      8/2/2020 1808 by Nadira Estrada RN  Outcome: Met This Shift  Goal: Control of chronic pain  Description: Control of chronic pain  8/3/2020 0719 by Trung Wilson RN  Outcome: Ongoing  8/2/2020 2352 by Laurence Blank RN  Outcome: Ongoing  8/2/2020 1808 by Nadira Estrada RN  Outcome: Met This Shift

## 2020-08-03 NOTE — PROGRESS NOTES
4 Eyes Skin Assessment     The patient is being assess for  Admission    I agree that 2 RN's have performed a thorough Head to Toe Skin Assessment on the patient. ALL assessment sites listed below have been assessed. Areas assessed by both nurses: Gennette Reusing  [x]   Head, Face, and Ears   [x]   Shoulders, Back, and Chest  [x]   Arms, Elbows, and Hands   [x]   Coccyx, Sacrum, and IschIum  [x]   Legs, Feet, and Heels        Does the Patient have Skin Breakdown?   No         Benson Prevention initiated:  Yes   Wound Care Orders initiated:  NA      [facility] nurse consulted for Pressure Injury (Stage 3,4, Unstageable, DTI, NWPT, and Complex wounds), New and Established Ostomies:  NA      Nurse 1 eSignature: Electronically signed by Ta Sweet RN on 8/3/20 at 6:15 PM EDT    **SHARE this note so that the co-signing nurse is able to place an eSignature**    Nurse 2 eSignature: Electronically signed by Venkata Valdivia on 8/3/20 at 6:43 PM EDT

## 2020-08-03 NOTE — CONSULTS
Consult Note  Physical Medicine and Rehabilitation    Patient: Hunter Archuleta  [id number]  Date: 8/3/2020      Chief Complaint: Left knee pain    History of Present Illness/Hospital Course:  Patient is an 83 yo F with pmh Asthma, CKD, Depression, GERD, Neuropathy, Osteoporosis, Scoliosis, and Pernicious anemia who initially presented 2020 with left knee pain s/p fall. Patient reports losing her balance while reaching for garage door and falling onto knee. No head trauma or LOC. Found to have deep left knee laceration. Underwent I&D and laceration repair ( with Dr. Dipak Kumar). Now WBAT. Course complicated by MARILIA and mild anemia. Currently, patient reports mild left knee pain. Worse with activity and weight bearing. Improves with rest and medication. She has baseline numbness/tingling in bilateral distal lower extremities but no new tingling/numbness or focal weakness. She would like to come to ARU to improve her function prior to returning home.      Prior Level of Function:  Independent for mobility, ADLs    Current Level of Function:  CGA-Elias for mobility and ADLs    Pertinent Social History:  Support: Lives with spouse  Home set-up: Multi level home with 7 steps to enter (7+14 steps to get to bathroom)    Past Medical History:   Diagnosis Date    Asthma     CKD (chronic kidney disease)     Depression     GERD (gastroesophageal reflux disease)     Neuropathy     Osteoporosis     Pernicious anemia     Scoliosis        Past Surgical History:   Procedure Laterality Date     SECTION       SECTION      x`s    KNEE SURGERY Left 2020    INCISION AND DRAINAGE LEFT KNEE performed by Bronwyn Landa MD at [address] [place] [address] History   Problem Relation Age of Onset    Cancer Mother     Heart Disease Father        Social History     Socioeconomic History    Marital status:      Spouse name: None    Number of children: None    Years of education: None    Highest education level: diabetic symptoms including polyuria, polydipsia and polyphagia  MUSCULOSKELETAL: refer to HPI  NEUROLOGICAL: negative for headaches, slurred speech, unilateral weakness  PSYCHIATRIC/BEHAVIORAL: negative for hallucinations, behavioral problems, confusion and agitation. Physical Examination:  Vitals:   Patient Vitals for the past 24 hrs:   BP Temp Temp src Pulse Resp SpO2 Weight   08/03/20 1133 126/66 98 °F (36.7 °C) Oral 71 16 92 % --   08/03/20 0842 113/76 97.9 °F (36.6 °C) Oral 74 16 95 % --   08/03/20 0421 127/78 98.4 °F (36.9 °C) Oral 61 15 94 % --   08/03/20 0009 119/68 97.8 °F (36.6 °C) Oral 71 15 92 % 161 lb 9.6 oz (73.3 kg)   08/02/20 2003 -- -- -- -- 16 95 % --   08/02/20 1522 103/61 97.4 °F (36.3 °C) Oral 88 16 92 % --     Const: Alert. WDWN. No distress  Eyes: Conjunctiva noninjected, no icterus noted; pupils equal, round, and reactive to light. HENT: Atraumatic, normocephalic; Oral mucosa moist  Neck: Trachea midline, neck supple. No thyromegaly noted. CV: Regular rate and rhythm, no murmur rub or gallop noted  Resp: Lungs clear to auscultation bilaterally, no rales wheezes or rhonchi, no retractions. Respirations unlabored. GI: Soft, nontender, nondistended. Normal bowel sounds. No palpable masses. Skin: Left knee incision dressed, c/d/i. Normal temperature and turgor. No rashes or breakdown noted. Ext: No significant edema appreciated. No varicosities. MSK: Left knee ROM decreased. Otherwise AROM intact, no joint tenderness, erythema, warmth noted. Neuro: Alert, oriented, appropriate. No cranial nerve deficits appreciated. Sensation intact to light touch but decreased in distal BLE. Motor examination reveals strength 5/5 in BUE/BLE except 4/5 hip flexion and left knee ext (not fully tested due to pain and recent surgery). No abnormalities with finger/nose noted. Reflexes diminished, symmetric.   Psych: Stable mood, normal judgement, normal affect     Lab Results   Component Value Date WBC 5.5 08/03/2020    HGB 11.1 (L) 08/03/2020    HCT 33.1 (L) 08/03/2020    MCV 95.4 08/03/2020     08/03/2020     Lab Results   Component Value Date    INR 0.94 07/31/2020    PROTIME 10.9 07/31/2020     Lab Results   Component Value Date    CREATININE 1.3 (H) 08/03/2020    BUN 31 (H) 08/03/2020     08/03/2020    K 4.2 08/03/2020     08/03/2020    CO2 31 08/03/2020     Lab Results   Component Value Date    ALT 15 02/10/2018    AST 26 02/10/2018    ALKPHOS 62 02/10/2018    BILITOT 0.6 02/10/2018       Most recent EKG revealed:  Sinus rhythm with sinus arrhythmia with short PRBiatrial enlargementNon-specific intra-ventricular conduction blockAbnormal ECGNo previous ECGs availableConfirmed by DICK MALHOTRA, [facility] ([id number]) on 9/28/2015 7:22:36 AM     Most recent imaging studies revealed:    Xray left knee  Prepatellar soft tissue defect, compatible with given history of laceration.         Tricompartmental degenerative change.         Overlap of the medial tibial plateau and medial femoral condyle on the    frontal view is likely projectional.  If there is strong clinical suspicion    for tibial plateau fracture or patient is unable to bear weight, follow-up    may be considered. Assessment:  1. Left knee laceration -s/p I&D and repair (7/31 with Dr. Bedford Hodgkin)   2. Anemia  3. MARILIA on CKD  4. Asthma  5. GERD  6. Osteoporosis  7. Neuropathy     Impairments: decreased left knee ROM, decreased balance, baseline BLE sensory deficit    Recommendations:    Patient with new functional deficits and ongoing medical complexity. Demonstrates ability to tolerate 3 hours therapy/day. She is a good candidate for acute inpatient rehab when medically appropriate. Thank you for this consult. Please contact me with any questions or concerns. Abigail Berry.  Gwen Faria MD 8/3/2020, 12:13 PM

## 2020-08-03 NOTE — PROGRESS NOTES
ADMISSION ORIENTATION    [address] [address] RECORD NUMBER:  [id number]  AGE: 82 y.o. GENDER: female  : 1935  TODAYS DATE:  8/3/2020      Room Orientation     Patient admitted to room  per [] Wheel Chair  [] Bed  [x] Recliner  [] Stretcher  [] Other: . Transferred to:  [] Bed  [x] Recliner  [] Other: .      Patient Required minimum assistance with Walker    Patient was oriented to:  [x] Call Light  [x] Room Phone  [x] TV  [x] Thermostat  [x] Bathroom  [x] Bed and Chair Alarms per Rehab Policy  [x] Closet  [x] Knight Soup and it's Use on Rehab  [] Other:    Patient Verbalized Understanding: Yes  Family Present: No      Rehab Orientation     [x] 3 Hours of Therapy  through   [x] Focus and Specialty of Physical, Occupational, and Speech and Language Pathology  [x] Weekly Team Conference and Discharge Planning  [x] Roles of the Rehab Doctor, Consulting Doctors, Nursing, Dietary, and Social Work  [x] Everyday Clothing, including proper footwear, for Therapy  [x] Visiting Hours, Visitor Ages, and Visitors Sleeping Overnight in the Hospital  [x] Visitor Participation in Therapy  [x]  and Sundays on Rehab  [x] Introduction of Welcome Folder, including Rehab Expectations, Nurse Manager's Welcome Letter, Visitor's Guide, and Menu Service  [x] Planning Ahead and Ordering Meals  [x] Falls Contract [x] Signed, [x] Copied, and [x] Taped to M.dot  [] Other:    Patient Verbalized Understanding: Yes  Family Present: No    Electronically signed by Aileen Pantoja RN on 8/3/2020 at 6:18 PM

## 2020-08-03 NOTE — PLAN OF CARE
Problem: Falls - Risk of:  Goal: Will remain free from falls  Description: Will remain free from falls  8/2/2020 2352 by Jeff Samuel RN  Outcome: Ongoing  Note: Patient educated on fall prevention. Call light is within reach, bed locked in lowest position, personal items within reach, and bed alarm is on. Will round on patient per unit guidelines. Problem: Falls - Risk of:  Goal: Absence of physical injury  Description: Absence of physical injury  8/2/2020 2352 by Jeff Samuel RN  Outcome: Ongoing  Note: Pt is free of injury. No injury noted. Fall precautions in place. Call light within reach. Will monitor. Problem: Pain:  Goal: Pain level will decrease  Description: Pain level will decrease  8/2/2020 2352 by Jeff Samuel RN  Outcome: Ongoing  Note: Pain /discomfort being managed with PRN analgesics per MD orders. Patient able to express presence and absence of pain and rate pain appropriately using numerical scale. Problem: Pain:  Goal: Control of acute pain  Description: Control of acute pain  8/2/2020 2352 by Jeff Samuel RN  Outcome: Ongoing  Note: Pain /discomfort being managed with PRN analgesics per MD orders. Patient able to express presence and absence of pain and rate pain appropriately using numerical scale.         Problem: Pain:  Goal: Control of chronic pain  Description: Control of chronic pain  8/2/2020 2352 by Jeff Samuel RN  Outcome: Ongoing

## 2020-08-03 NOTE — DISCHARGE SUMMARY
home. The patient had fallen asleep on the couch. She got up around 2 am on the date of admission and made her bed. Prior to going to bed she went to make sure all of her doors were locked. While reaching for the garage door she lost her balance. She was unable to get up. Her  came and called EMS. The patient was noted to have bleeding from her L knee due to a laceration. She was brought to [facility] ER for further treatment. Ortho was consulted and she was taken to the OR 7/31 for exploration and repair of laceration. L knee laceration  S/p OR exploration and repair by ortho on 7/31  PRN pain management  PT/OT  - The patient and her  have a chairlift at home, however it is not functioning. Her  is contacting somebody to hopefully have this fixed on Monday so she can safely return home if possible.     Fall at home  2/2 mechanical cause   Treat underlying cause  PT/OT     Asthma, not currently in exacerbation  Continue current medical regimen  Nebulizer treatments  Supportive therapies  Supplemental oxygen as needed (titrate to SpO2 88-92%)   Monitor for s/s of exacerbation     Chronic Kidney Disease III  Avoid nephrotoxic medications as able. Renally dose medications. Monitor for electrolyte abnormalities.     Depression/anxiety  Continue current meds  Supportive therapies     Gerd  PPI     Hx of breast cancer  On arimidex  Follows with Dr. Rad Magana           Consults: orthopedic surgery    Imaging Studies:  Xr Knee Left (1-2 Views)    Result Date: 7/31/2020  EXAMINATION: 2 XRAY VIEWS OF THE LEFT KNEE 7/31/2020 6:18 am COMPARISON: None. HISTORY: ORDERING SYSTEM PROVIDED HISTORY: Knee lac TECHNOLOGIST PROVIDED HISTORY: Reason for exam:->Knee lac Reason for Exam: Knee lac/fall Acuity: Acute Type of Exam: Initial FINDINGS: Soft tissue defect is demonstrated within the prepatellar soft tissues. Tricompartmental joint space loss is seen with spurring, in keeping with degenerative change. The medial tibial plateau overlaps the medial femoral condyle on the frontal view. Prepatellar soft tissue defect, compatible with given history of laceration. Tricompartmental degenerative change. Overlap of the medial tibial plateau and medial femoral condyle on the frontal view is likely projectional.  If there is strong clinical suspicion for tibial plateau fracture or patient is unable to bear weight, follow-up may be considered. Arvind Digital Screen W Or Wo Cad Bilateral    Result Date: 2020  EXAMINATION: BILATERAL DIGITAL SCREENING MAMMOGRAM 2020 TECHNIQUE: CC and MLO views of the left and right breasts were obtained. Computer aided detection was utilized in the interpretation of this exam. COMPARISON: 2018 and 2018 HISTORY: Screening. FINDINGS: There are scattered areas of fibroglandular density. There is no new dominant mass, suspicious microcalcification, or area of architectural distortion. No change in the bilateral calcifications or left post treatment changes. No mammographic evidence of malignancy. BIRADS: BIRADS - CATEGORY 2 Benign, no evidence of malignancy. Normal interval follow-up is recommended in 12 months. OVERALL ASSESSMENT - BENIGN A letter of notification will be sent to the patient regarding the results. The [employer] recommends annual mammograms for women 40 years and older.        Other Significant Diagnostic Studies: As described above    Treatments: As described above    Disposition: Acute rehab    Discharge Medications:     Divine Mcmullen   Home Medication Instructions [facility] [place]    Printed on:20 4648   Medication Information                      albuterol sulfate  (90 Base) MCG/ACT inhaler  Inhale 2 puffs into the lungs every 6 hours as needed for Wheezing             alendronate (FOSAMAX) 70 MG tablet  Take 70 mg by mouth every 7 days             anastrozole (ARIMIDEX) 1 MG tablet  Take 1 mg by mouth daily Ascorbic Acid (VITAMIN C) 250 MG tablet  Take 250 mg by mouth daily             aspirin EC 81 MG EC tablet  Take 1 tablet by mouth 2 times daily for 14 days Please avoid missing doses. bismuth subsalicylate (PEPTO BISMOL) 262 MG chewable tablet  Take 262 mg by mouth 4 times daily as needed              calcium carbonate (TUMS) 500 MG chewable tablet  Take 1 tablet by mouth 3 times daily as needed for Heartburn             calcium citrate-vitamin D (CITRICAL + D) 315-250 MG-UNIT TABS per tablet  Take 1 tablet by mouth 2 times daily (with meals)             clonazePAM (KLONOPIN) 0.5 MG tablet  Take 0.5 mg by mouth 2 times daily as needed. Ergocalciferol (VITAMIN D2 PO)  Take 50,000 Units by mouth Twice a Week Twice weekly              fluticasone-salmeterol (ADVAIR HFA) 115-21 MCG/ACT inhaler  Inhale 2 puffs into the lungs 2 times daily             gabapentin (NEURONTIN) 100 MG capsule  Take 200 mg by mouth nightly. omeprazole (PRILOSEC) 40 MG delayed release capsule  Take 1 capsule by mouth daily             RaNITidine HCl (RANITIDINE 75 PO)  Take by mouth             triamterene-hydrochlorothiazide (MAXZIDE) 75-50 MG per tablet               venlafaxine (EFFEXOR XR) 150 MG extended release capsule  Take 150 mg by mouth daily                 35 Minutes spent on patient evaluation, counseling and discharge planning.      Signed:  Brenda Wheeler MD  8/3/2020, 1:27 PM

## 2020-08-03 NOTE — PROGRESS NOTES
Patient alert and oriented, discharged to acute rehab with documented belongings. IV removed with no complications. Transported to Acute rehab in chair. Report given to [facility] [employer] Personal belongings in patient lap as she is taken in the chair to rehab. Felicia Burgess RN in patient room as this nurse exits.  Electronically signed by Gopal Lam RN on 8/3/2020 at 3:50 PM

## 2020-08-03 NOTE — PROGRESS NOTES
Physical Therapy  [place]   [id number]  8/3/2020   1151  Attempt Note    Attempted to see patient this morning at 850am, pt sitting in BS chair and wanted to finish breakfast. Attempted to see patient again at 1145am; however, pt eating lunch. Will attempt later in the day if time allows and patient is willing to participate in therapy.      Livia Garcia, Student Physical Therapist  I attest that I was present for and made a skilled & mindful clinical judgement during the evaluation and/or treatment of this patient on 8/3/2020  Electronically signed by Lazaro Hall, [address] [place] [address] Drive (#[phone])  on 8/3/2020 at 12:40 PM

## 2020-08-03 NOTE — PROGRESS NOTES
Bed in lowest position, wheels locked, bed exit alarm in place, call light within reach, and non skid footwear on. Walkway free of clutter. Pt alert and oriented and able to make needs known. Pt educated to use call light when needing to get up, and pt utilizes call light to make needs known. Will continue to monitor.  Electronically signed by Gopal Lam RN on 8/3/2020 at 7:10 AM

## 2020-08-03 NOTE — CARE COORDINATION
8/3 spoke w/ patient--agreeable to transfer to Rehab.   Electronically signed by Ronak Cortez on 8/3/2020 at 1:06 PM

## 2020-08-04 LAB
ANION GAP SERPL CALCULATED.3IONS-SCNC: 10 MMOL/L (ref 3–16)
BASOPHILS ABSOLUTE: 0 K/UL (ref 0–0.2)
BASOPHILS RELATIVE PERCENT: 0.6 %
BUN BLDV-MCNC: 30 MG/DL (ref 7–20)
CALCIUM SERPL-MCNC: 11 MG/DL (ref 8.3–10.6)
CHLORIDE BLD-SCNC: 102 MMOL/L (ref 99–110)
CO2: 28 MMOL/L (ref 21–32)
CREAT SERPL-MCNC: 1.1 MG/DL (ref 0.6–1.2)
EOSINOPHILS ABSOLUTE: 0.2 K/UL (ref 0–0.6)
EOSINOPHILS RELATIVE PERCENT: 4.1 %
GFR AFRICAN AMERICAN: 57
GFR NON-AFRICAN AMERICAN: 47
GLUCOSE BLD-MCNC: 96 MG/DL (ref 70–99)
HCT VFR BLD CALC: 33.1 % (ref 36–48)
HEMOGLOBIN: 11.1 G/DL (ref 12–16)
LYMPHOCYTES ABSOLUTE: 1.4 K/UL (ref 1–5.1)
LYMPHOCYTES RELATIVE PERCENT: 25.3 %
MCH RBC QN AUTO: 32 PG (ref 26–34)
MCHC RBC AUTO-ENTMCNC: 33.6 G/DL (ref 31–36)
MCV RBC AUTO: 95.1 FL (ref 80–100)
MONOCYTES ABSOLUTE: 0.6 K/UL (ref 0–1.3)
MONOCYTES RELATIVE PERCENT: 11 %
NEUTROPHILS ABSOLUTE: 3.2 K/UL (ref 1.7–7.7)
NEUTROPHILS RELATIVE PERCENT: 59 %
PDW BLD-RTO: 13.5 % (ref 12.4–15.4)
PLATELET # BLD: 178 K/UL (ref 135–450)
PMV BLD AUTO: 8.3 FL (ref 5–10.5)
POTASSIUM REFLEX MAGNESIUM: 4.1 MMOL/L (ref 3.5–5.1)
PREALBUMIN: 23.3 MG/DL (ref 20–40)
RBC # BLD: 3.47 M/UL (ref 4–5.2)
SODIUM BLD-SCNC: 140 MMOL/L (ref 136–145)
WBC # BLD: 5.4 K/UL (ref 4–11)

## 2020-08-04 PROCEDURE — 97162 PT EVAL MOD COMPLEX 30 MIN: CPT

## 2020-08-04 PROCEDURE — 1280000000 HC REHAB R&B

## 2020-08-04 PROCEDURE — 80048 BASIC METABOLIC PNL TOTAL CA: CPT

## 2020-08-04 PROCEDURE — 85025 COMPLETE CBC W/AUTO DIFF WBC: CPT

## 2020-08-04 PROCEDURE — 97530 THERAPEUTIC ACTIVITIES: CPT

## 2020-08-04 PROCEDURE — 84134 ASSAY OF PREALBUMIN: CPT

## 2020-08-04 PROCEDURE — 97116 GAIT TRAINING THERAPY: CPT

## 2020-08-04 PROCEDURE — 36415 COLL VENOUS BLD VENIPUNCTURE: CPT

## 2020-08-04 PROCEDURE — 94760 N-INVAS EAR/PLS OXIMETRY 1: CPT

## 2020-08-04 PROCEDURE — 94640 AIRWAY INHALATION TREATMENT: CPT

## 2020-08-04 PROCEDURE — 6370000000 HC RX 637 (ALT 250 FOR IP): Performed by: PHYSICAL MEDICINE & REHABILITATION

## 2020-08-04 PROCEDURE — 97535 SELF CARE MNGMENT TRAINING: CPT

## 2020-08-04 PROCEDURE — 6360000002 HC RX W HCPCS: Performed by: PHYSICAL MEDICINE & REHABILITATION

## 2020-08-04 PROCEDURE — 97166 OT EVAL MOD COMPLEX 45 MIN: CPT

## 2020-08-04 RX ORDER — OMEPRAZOLE 20 MG/1
20 CAPSULE, DELAYED RELEASE ORAL DAILY
COMMUNITY

## 2020-08-04 RX ADMIN — Medication 250 MG: at 08:24

## 2020-08-04 RX ADMIN — ANASTROZOLE 1 MG: 1 TABLET ORAL at 09:00

## 2020-08-04 RX ADMIN — ERGOCALCIFEROL 50000 UNITS: 1.25 CAPSULE ORAL at 08:24

## 2020-08-04 RX ADMIN — DOCUSATE SODIUM 50 MG AND SENNOSIDES 8.6 MG 1 TABLET: 8.6; 5 TABLET, FILM COATED ORAL at 21:59

## 2020-08-04 RX ADMIN — CLONAZEPAM 0.5 MG: 0.5 TABLET ORAL at 22:01

## 2020-08-04 RX ADMIN — DOCUSATE SODIUM 50 MG AND SENNOSIDES 8.6 MG 1 TABLET: 8.6; 5 TABLET, FILM COATED ORAL at 08:29

## 2020-08-04 RX ADMIN — GABAPENTIN 200 MG: 100 CAPSULE ORAL at 21:59

## 2020-08-04 RX ADMIN — BUDESONIDE AND FORMOTEROL FUMARATE DIHYDRATE 2 PUFF: 160; 4.5 AEROSOL RESPIRATORY (INHALATION) at 08:02

## 2020-08-04 RX ADMIN — TRIAMTERENE AND HYDROCHLOROTHIAZIDE 2 TABLET: 37.5; 25 TABLET ORAL at 08:25

## 2020-08-04 RX ADMIN — ENOXAPARIN SODIUM 40 MG: 40 INJECTION SUBCUTANEOUS at 08:23

## 2020-08-04 RX ADMIN — BUDESONIDE AND FORMOTEROL FUMARATE DIHYDRATE 2 PUFF: 160; 4.5 AEROSOL RESPIRATORY (INHALATION) at 20:09

## 2020-08-04 RX ADMIN — PANTOPRAZOLE SODIUM 40 MG: 40 TABLET, DELAYED RELEASE ORAL at 06:01

## 2020-08-04 RX ADMIN — CALCIUM CARBONATE-CHOLECALCIFEROL TAB 250 MG-125 UNIT 250 MG: 250-125 TAB at 08:24

## 2020-08-04 RX ADMIN — CALCIUM CARBONATE-CHOLECALCIFEROL TAB 250 MG-125 UNIT 250 MG: 250-125 TAB at 16:59

## 2020-08-04 RX ADMIN — VENLAFAXINE HYDROCHLORIDE 150 MG: 75 CAPSULE, EXTENDED RELEASE ORAL at 08:24

## 2020-08-04 ASSESSMENT — PAIN SCALES - GENERAL
PAINLEVEL_OUTOF10: 0

## 2020-08-04 ASSESSMENT — PAIN SCALES - WONG BAKER
WONGBAKER_NUMERICALRESPONSE: 0

## 2020-08-04 NOTE — H&P
Department of Physical Medicine & Rehabilitation  History & Physical      Patient Identification:  Darya Denis  : 1935  Admit date: 8/3/2020   Attending provider: Maira Graves MD        Primary care provider: Rachelle Stauffer MD     Chief Complaint: Left knee pain     History of Present Illness/Hospital Course:  Patient is an 83 yo F with pmh Asthma, CKD, Depression, GERD, Neuropathy, Osteoporosis, Scoliosis, and Pernicious anemia who initially presented 2020 with left knee pain s/p fall. Patient reports losing her balance while reaching for garage door and falling onto knee. No head trauma or LOC. Found to have deep left knee laceration. Underwent I&D and laceration repair ( with Dr. Tanya Rodriguez). Now WBAT. Course complicated by MARILIA and mild anemia. Now presents to ARU with impaired mobility and self-care below her baseline. Currently, patient reports mild left knee pain. Worse with activity and weight bearing. Improves with rest and medication. She has baseline numbness/tingling in bilateral distal lower extremities but no new tingling/numbness or focal weakness.  She is motivated to start inpatient rehab program.      Prior Level of Function:  Independent for mobility, ADLs     Current Level of Function:  CGA-Elias for mobility and ADLs     Pertinent Social History:  Support: Lives with spouse  Home set-up: Multi level home with 7 steps to enter (7+14 steps to get to bathroom)       Past Medical History:   Diagnosis Date    Asthma     Cancer ([facility] [address]     Breast cancer on left side     CKD (chronic kidney disease)     Depression     GERD (gastroesophageal reflux disease)     Neuropathy     Osteoporosis     Pernicious anemia     Scoliosis        Past Surgical History:   Procedure Laterality Date     SECTION       SECTION      x`s    KNEE SURGERY Left 2020    INCISION AND DRAINAGE LEFT KNEE performed by Fadumo Combs MD at [address] [place] [address] History   Problem Relation Age of Onset    Cancer Mother     Heart Disease Father        Social History     Socioeconomic History    Marital status:      Spouse name: None    Number of children: None    Years of education: None    Highest education level: None   Occupational History    None   Social Needs    Financial resource strain: None    Food insecurity     Worry: None     Inability: None    Transportation needs     Medical: None     Non-medical: None   Tobacco Use    Smoking status: Never Smoker    Smokeless tobacco: Never Used   Substance and Sexual Activity    Alcohol use: No    Drug use: No    Sexual activity: Not Currently   Lifestyle    Physical activity     Days per week: None     Minutes per session: None    Stress: None   Relationships    Social connections     Talks on phone: None     Gets together: None     Attends Cheondoism service: None     Active member of club or organization: None     Attends meetings of clubs or organizations: None     Relationship status: None    Intimate partner violence     Fear of current or ex partner: None     Emotionally abused: None     Physically abused: None     Forced sexual activity: None   Other Topics Concern    None   Social History Narrative    None       Allergies   Allergen Reactions    Latex Hives and Itching         Current Facility-Administered Medications   Medication Dose Route Frequency Provider Last Rate Last Dose    promethazine (PHENERGAN) tablet 12.5 mg  12.5 mg Oral Q6H PRN Pedro Carreon MD        Or    ondansetron [facility]) injection 4 mg  4 mg Intravenous Q6H PRN Pedro Carreon MD        acetaminophen (TYLENOL) tablet 650 mg  650 mg Oral Q6H PRN Pedro Carreon MD        enoxaparin (LOVENOX) injection 40 mg  40 mg Subcutaneous Daily Pedro Carreon MD   40 mg at 08/04/20 0823    magnesium hydroxide (MILK OF MAGNESIA) 400 MG/5ML suspension 30 mL  30 mL Oral Daily PRN Pedro Carreon MD        polyethylene glycol (GLYCOLAX) packet 17 g  17 g Oral Daily PRN Reji Spears MD        sennosides-docusate sodium (SENOKOT-S) 8.6-50 MG tablet 1 tablet  1 tablet Oral BID Reji Spears MD   1 tablet at 08/04/20 2945    anastrozole (ARIMIDEX) tablet 1 mg  1 mg Oral Daily Reji Spears MD   1 mg at 08/04/20 0900    bisacodyl (DULCOLAX) suppository 10 mg  10 mg Rectal Daily PRN Reji Spears MD        bismuth subsalicylate (PEPTO BISMOL) chewable tablet 262 mg  262 mg Oral Daily PRN Reji Spears MD        budesonide-formoterol [facility]) 160-4.5 MCG/ACT inhaler 2 puff  2 puff Inhalation BID Reji Spears MD   2 puff at 08/04/20 0802    calcium carbonate (TUMS) chewable tablet 500 mg  500 mg Oral TID PRN Reji Spears MD        clonazePAM Nonda Naegeli) tablet 0.5 mg  0.5 mg Oral BID PRN Reji Spears MD        gabapentin (NEURONTIN) capsule 200 mg  200 mg Oral Nightly Reji Spears MD   200 mg at 08/03/20 2118    hydrALAZINE (APRESOLINE) tablet 25 mg  25 mg Oral Q6H PRN Reji Spears MD        ipratropium-albuterol (DUONEB) nebulizer solution 1 ampule  1 ampule Inhalation Q4H PRN Reji Spears MD        melatonin tablet 3 mg  3 mg Oral Nightly PRN Reji Spears MD        oyster shell calcium/vitamin D 250-125 MG-UNIT per tablet 250 mg  1 tablet Oral BID WC Reji Spears MD   250 mg at 08/04/20 0824    pantoprazole (PROTONIX) tablet 40 mg  40 mg Oral QAM AC Reji Spears MD   40 mg at 08/04/20 0601    triamterene-hydroCHLOROthiazide (MAXZIDE-25) 37.5-25 MG per tablet 2 tablet  2 tablet Oral Daily Reji Spears MD   2 tablet at 08/04/20 0825    venlafaxine (EFFEXOR XR) extended release capsule 150 mg  150 mg Oral Daily Reji Spears MD   150 mg at 08/04/20 9133    vitamin C (ASCORBIC ACID) tablet 250 mg  250 mg Oral Daily Reji Spears MD   250 mg at 08/04/20 5726    vitamin D (ERGOCALCIFEROL) capsule 50,000 Units  50,000 Units Oral Weekly Juanis Machuca MD   50,000 Units at 08/04/20 4151         REVIEW OF SYSTEMS:   CONSTITUTIONAL: negative for fevers, chills, diaphoresis, appetite change, night sweats, unexpected weight change, fatigue  EYES: negative for blurred vision, eye discharge, visual disturbance and icterus  HEENT: negative for hearing loss, tinnitus, ear drainage, sinus pressure, nasal congestion, epistaxis and snoring  RESPIRATORY: Negative for hemoptysis, cough, sputum production  CARDIOVASCULAR: negative for chest pain, palpitations, exertional chest pressure/discomfort, syncope, edema  GASTROINTESTINAL: negative for nausea, vomiting, diarrhea, blood in stool, abdominal pain, constipation  GENITOURINARY: negative for frequency, dysuria, urinary incontinence, decreased urine volume, and hematuria  HEMATOLOGIC/LYMPHATIC: negative for easy bruising, bleeding and lymphadenopathy  ALLERGIC/IMMUNOLOGIC: negative for recurrent infections, angioedema, anaphylaxis and drug reactions  ENDOCRINE: negative for weight changes and diabetic symptoms including polyuria, polydipsia and polyphagia  MUSCULOSKELETAL: refer to HPI  NEUROLOGICAL: negative for headaches, slurred speech, unilateral weakness  PSYCHIATRIC/BEHAVIORAL: negative for hallucinations, behavioral problems, confusion and agitation.        All pertinent positives are noted in the HPI. Physical Examination:  Vitals:   Patient Vitals for the past 24 hrs:   BP Temp Temp src Pulse Resp SpO2 Height Weight   08/04/20 0936 -- -- -- -- -- -- 5' 4\" (1.626 m) --   08/04/20 0804 -- -- -- -- -- 94 % -- --   08/04/20 0259 123/68 97.6 °F (36.4 °C) Oral 67 20 (!) 89 % -- 158 lb 11.7 oz (72 kg)   08/03/20 2130 (!) 150/78 97.6 °F (36.4 °C) Oral 81 16 95 % -- --   08/03/20 2026 -- -- -- -- 16 92 % -- --   08/03/20 1730 -- -- -- -- -- -- 5' 4\" (1.626 m) 158 lb 11.7 oz (72 kg)   08/03/20 1600 125/66 97.3 °F (36.3 °C) Oral 60 16 -- -- --       Const: Alert. WDWN.  No distress  Eyes: Conjunctiva noninjected, no icterus noted; pupils equal, round, and reactive to light. HENT: Atraumatic, normocephalic; Oral mucosa moist  Neck: Trachea midline, neck supple. No thyromegaly noted. CV: Regular rate and rhythm, no murmur rub or gallop noted  Resp: Lungs clear to auscultation bilaterally, no rales wheezes or rhonchi, no retractions. Respirations unlabored. GI: Soft, nontender, nondistended. Normal bowel sounds. No palpable masses. Skin: Left knee incision dressed, c/d/i. Normal temperature and turgor. No rashes or breakdown noted. Ext: No significant edema appreciated. No varicosities. MSK: Left knee ROM decreased. Otherwise AROM intact, no joint tenderness, erythema, warmth noted. Neuro: Alert, oriented, appropriate. No cranial nerve deficits appreciated. Sensation intact to light touch but decreased in distal BLE. Motor examination reveals strength 5/5 in BUE/BLE except 4/5 hip flexion and left knee ext (not fully tested due to pain and recent surgery). No abnormalities with finger/nose noted. Reflexes diminished, symmetric.   Psych: Stable mood, normal judgement, normal affect    Lab Results   Component Value Date    WBC 5.4 08/04/2020    HGB 11.1 (L) 08/04/2020    HCT 33.1 (L) 08/04/2020    MCV 95.1 08/04/2020     08/04/2020     Lab Results   Component Value Date    INR 0.94 07/31/2020    PROTIME 10.9 07/31/2020     Lab Results   Component Value Date    CREATININE 1.1 08/04/2020    BUN 30 (H) 08/04/2020     08/04/2020    K 4.1 08/04/2020     08/04/2020    CO2 28 08/04/2020     Lab Results   Component Value Date    ALT 15 02/10/2018    AST 26 02/10/2018    ALKPHOS 62 02/10/2018    BILITOT 0.6 02/10/2018       Most recent EKG revealed:  Sinus rhythm with sinus arrhythmia with short PRBiatrial enlargementNon-specific intra-ventricular conduction blockAbnormal ECGNo previous ECGs availableConfirmed by DICK MALHOTRA, Amos Robertson ([id number]) on 9/28/2015 7:22:36 AM      Most recent imaging studies revealed:     Xray left knee  Prepatellar soft tissue defect, compatible with given history of laceration.         Tricompartmental degenerative change.         Overlap of the medial tibial plateau and medial femoral condyle on the    frontal view is likely projectional.  If there is strong clinical suspicion    for tibial plateau fracture or patient is unable to bear weight, follow-up    may be considered. The above laboratory data have been reviewed. The above imaging data have been reviewed. The above medical testing have been reviewed. Body mass index is 27.25 kg/m². POST ADMISSION PHYSICIAN EVALUATION  The patient has agreed to being admitted to our comprehensive inpatient rehabilitation facility and can tolerate the intensity of service consisting of at least:  --180 minutes of therapy a day, 5 out of 7 days a week. OR  --15 hours of intensive therapy within a 7 consecutive day period. The patient/family has a good understanding of our discharge process and will benefit from an interdisciplinary inpatient rehabilitation program. The patient has potential to make improvement and is in need of at least two of the following multidisciplinary therapies including but not limited to physical, occupational, respiratory, and speech, nutritional services, wound care, and prosthetics and orthotics. Given the patients complex condition and risk of further medical complications, rehabilitation services cannot be safely provided at a lower level of care such as a skilled nursing facility. All of the goals listed below were reviewed with the patient and he/she is in agreement. I have compared the patients medical and functional status at the time of the preadmission screening and the same on this date, and there are no significant changes.     By signing this document, I acknowledge that I have personally performed a full physical examination on this patient within 24 hours of admission to this inpatient rehabilitation facility and have determined the patient to be able to tolerate the above course of treatment at an intensive level for a reasonable period of time. I will be completing a detailed individualized  Plan of Care for this patient by day four of the patients stay based upon the Preadmission Screen, this Post-Admission Evaluation, and the therapy evaluations. Barriers: decreased left knee ROM, decreased balance, baseline BLE sensory deficit, medical comorbidities  Services Required: PT, OT  Goals: Blake for mobility and ADLs  Prognosis: Good  Anticipated Dispo: home with   ELOS: 1 week    Rehabilitation Diagnosis:   Orthopedic, 8.9, Other Orthopedic      Assessment and Plan:    Impairments: decreased left knee ROM, decreased balance, baseline BLE sensory deficit    Left knee   -s/p I&D and repair (7/31 with Dr. Temitope Andrews)   -Wound care  -WBAT  -PT/OT    Acute on chronic anemia  -Blood loss + h/o Pernicious anemia  -Monitor Hgb, transfuse prn <7  -Check B12    MARILIA on CKD  -Avoid nephrotoxins, renally dose meds  -Monitor BMP Mon/Thurs    HTN  -triamterene-HCTZ    Asthma  -Symbicort, prn duoneb    GERD  -Pantroprazole, prn TUMS    Osteoporosis  -Calcium+Vitamin D supplement    H/o breast cancer  -Anastrazole    Depression/Anxiety  -Venlafaxine, prn clonazepam    Idiopathic Neuropathy  -Contributing to fall  -Check B12  -PT/OT     Bladder   -High risk retention   -Monitor PVRs, SC prn >300cc    Bowel   -High risk constipation   -senna+colace BID, PRN miralax, MoM, and bisacodyl supp. Pain control  -Gabapentin, PRN acetaminophen    PPx  -DVT: lovenox  -GI: pantoprazole    FULL CODE    Thomas Almendarez MD 8/4/2020, 12:07 PM

## 2020-08-04 NOTE — PLAN OF CARE
Problem: SKIN INTEGRITY  Goal: Skin integrity is maintained or improved  Outcome: Ongoing  Note: Assessment completed. No skin breakdown noted. Adequate fluid and nutritional intake. Skin clean and dry. Problem: KNOWLEDGE DEFICIT  Goal: Patient/S.O. demonstrates understanding of disease process, treatment plan, medications, and discharge instructions. Outcome: Ongoing  Note: Knowledge of medications and side effects assessed by teach back strategy.  Questions asked and answered to patients satisfaction

## 2020-08-04 NOTE — PROGRESS NOTES
difficulty with higher level balance, decreased activity tolerance with increased time to recover, decreased safety awareness  Clinical Presentation: evolving  PT Education: Goals;PT Role;Plan of Care;Transfer Training; Adaptive Device Training;Equipment;General Safety;Gait Training;Functional Mobility Training  Barriers to Learning: [place]  REQUIRES PT FOLLOW UP: Yes  Activity Tolerance  Activity Tolerance: Patient Tolerated treatment well;Patient limited by endurance  Activity Tolerance: Pt denies pain throughout treatment session. Patient Diagnosis(es): There were no encounter diagnoses. has a past medical history of Asthma, Cancer ([facility] [address] CKD (chronic kidney disease), Depression, GERD (gastroesophageal reflux disease), Neuropathy, Osteoporosis, Pernicious anemia, and Scoliosis. has a past surgical history that includes  section;  section; and knee surgery (Left, 2020). Restrictions  Restrictions/Precautions  Restrictions/Precautions: Fall Risk, Weight Bearing  Lower Extremity Weight Bearing Restrictions  Left Lower Extremity Weight Bearing: Weight Bearing As Tolerated  Vision/Hearing  Vision: Impaired  Vision Exceptions: Wears glasses for reading(pt states watching tv and reading)  Hearing: Exceptions to [facility]  Hearing Exceptions: Hard of hearing/hearing concerns; No hearing aid     Subjective  General  Chart Reviewed: Yes  Patient assessed for rehabilitation services?: Yes  Additional Pertinent Hx: \"83 yo F with pmh Asthma, CKD, Depression, GERD, Neuropathy, Osteoporosis, Scoliosis, and Pernicious anemia who initially presented 2020 with left knee pain s/p fall. Patient reports losing her balance while reaching for garage door and falling onto knee. No head trauma or LOC. Found to have deep left knee laceration. Underwent I&D and laceration repair ( with Dr. Shannan Lomas). Now WBAT. Course complicated by MARILIA and mild anemia. Currently, patient reports mild left knee pain.  Worse with activity and weight bearing. Improves with rest and medication. She has baseline numbness/tingling in bilateral distal lower extremities but no new tingling/numbness or focal weakness. She would like to come to ARU to improve her function prior to returning home. \" (Dr. Sherry Mejia 8/3/2020)  Response To Previous Treatment: Not applicable  Family / Caregiver Present: No  Referring Practitioner: Dr. Sherry Mejia  Referral Date : 08/03/20  Diagnosis: L knee laceration repair 7/31/2020  Follows Commands: Within Functional Limits  General Comment  Comments: Pt sitting in wheelchair at beginning of treatment session  Subjective  Subjective: Pt without complaint, denies pain. Pt agreeable to PT evaluation at this time. Pt reports progressive off balance and poor coordination with BUE progressive over the past month. Pt complains that \"tailbone and low back\" irritate her with movement/walking causing her the most problems.           Orientation  Orientation  Overall Orientation Status: Within Normal Limits(BIMs 15/15 this date)  Social/Functional History  Social/Functional History  Lives With: Spouse( Magdy- good health without device)  Type of Home: House  Home Layout: Multi-level(3 floors 7 steps to bedroom and bathroom, tri level)  Home Access: Stairs to enter with rails  Entrance Stairs - Number of Steps: 7 from living room to bed room level with bilateral railing, 14 total steps to get to bedroom and bathroom, garge to enter level entry into living without bathroom  Entrance Stairs - Rails: Both  Bathroom Shower/Tub: Walk-in shower, Shower chair with back, Curtain  Bathroom Toilet: Standard(one toliet is taller in the othe bathroom)  Bathroom Equipment: Shower chair, Grab bars in shower(no prior toilet equipment)  Bathroom Accessibility: Walker accessible  Home Equipment: 4 wheeled walker, Rolling walker, Kaitlynn Louann, Grab bars, Reacher  Receives Help From: Family  ADL Assistance: and denies deminished touch)  Bed mobility  Rolling to Left: Stand by assistance  Rolling to Right: Stand by assistance  Supine to Sit: Stand by assistance  Sit to Supine: Contact guard assistance  Scooting: Contact guard assistance  Comment: increased time to perform all functional mobility tasks, sitting EOB pillows to patient left at this time with increased time to perform. HOB flat without railing  Transfers  Sit to Stand: Contact guard assistance  Stand to sit: Stand by assistance  Bed to Chair: Stand by assistance(short distance in room from bed to recliner)  Car Transfer: Contact guard assistance(Mock car transfer cues for hand placement throughout/proper sequencing for safety, increased time to perform)  Comment: multiple surfaces for transfer including EOB without railing, w/c with arm rests, commode with BSC over for raised seat with grab bars, and recliner chair, increased time with cues for hand placement for safety throughout. Ambulation  Ambulation?: Yes  WB Status: WBAT  Ambulation 1  Surface: level tile  Device: Rolling Walker  Assistance: Contact guard assistance  Quality of Gait: slow, step to pattern w/ decreased step length and height. good control of RW  Gait Deviations: Slow Sindi;Decreased step length;Decreased step height  Distance: 200' with multiple turns X 2 trials, short distances in room. Comments: pt tolerates well with only mild fatigue  Stairs/Curb  Stairs?: No(time limits, will attempt with PM treatment session)     Balance  Posture: Good  Sitting - Static: Good  Sitting - Dynamic: Good  Standing - Static: Good;-  Standing - Dynamic: Fair;+  Comments: use of RW for stability, picking up object unable to perform this morning but will attempt with PM session. Standing at RW able to doff/don pants with SBA and washing hands at sink SBA. MOd I sitting balance and nkechi care at this time.        PM session:   S: Pt reports feeling well this afternoon, denies pain at this time and very pleasant. Agreeable to PT treatment session. O: Sit to stand CGA throughout session with cues for safety, stand to sit SBA with increased time to perform. Standing at RW able to pick pen off floor standing at RW for balance stooping over with CGA cues for safety. Pt ambulated short distances in therapy gym to access steps with RW SBA-CGA with cues to maintain body within base of RW this afternoon, tends to be slight impulsive with fatigue. Pushes RW too far forward with maneuvering needing cues to correct body positioning. Ascend/descend step 6\" height X 12 steps with right railing initially min assist due to attempt reciprocal pattern needing cues to changes to nonreciprocal pattern progressing to CGA with practice, descending CGA throughout with cues for safety increased time. Pt with increased fatigue with performing steps at this time needing increased time to recover. Right railing ascending throughout. Ascend/descend curb step with RW 6\" height CGA with VC for proper sequencing, slight impulsive needing cues for safety throughout. Provided education that will need increased use of RW especially at night to improve stability and decreased risk for falls. Pt ambulated with ' and 180' (20' on carpet during each ambulation) with multiple turns with fatigue increased flexed posture leading to decreased clearance and control especially with RLE to LLE. Pt catches right foot and poor control slapping to floor quick. Cues to improve posture within RW, receptive and improves BLEs clearance with upright posture but fatigues quickly. Pt assisted with positioning in recliner at completion of session with BLEs elevated. A: Continue per AM established plan. Tolerated PM session well with only mild increased fatigue at this time. Pt able to perform all functional mobility tasks with increased time to recover.      Safety Device - Type of devices:  [x]  All fall risk precautions in place [] Bed alarm

## 2020-08-04 NOTE — PROGRESS NOTES
Physician Progress Note      Yuri GARIBAY #:                  [id number]  :                       1935  ADMIT DATE:       2020 5:56 AM  DISCH DATE:        8/3/2020 12:49 PM  RESPONDING  PROVIDER #:        Casper White MD          QUERY TEXT:    Patient admitted with L Knee Laceration. Per Op note dated 20   documentation of debridement. To accurately reflect the procedure performed   please document if debridement was excisional or nonexcisional and the deepest   depth of tissue removed as down to and including: The medical record reflects the following:  Risk Factors: L knee Laceration  Clinical Indicators: OP Note we used Pulsavac for thorough debridement. We   then also used a #15 blade to remove any unviable soft tissue. Treatment: Debridement  Options provided:  -- Excisional debridement of muscle  -- Excisional debridement of fascia  -- Excisional debridement of subcutaneous tissue  -- Excisional debridement of skin  -- Nonexcisional debridement of skin  -- Nonexcisional debridement of subcutaneous tissue  -- Nonexcisional debridement of fascia  -- Nonexcisional debridement of muscle  -- Nonexcisional debridement of bone  -- Excisional debridement of bone  -- Other - I will add my own diagnosis  -- Disagree - Clinically unable to determine / Unknown  -- Refer to Clinical Documentation Reviewer    PROVIDER RESPONSE TEXT:    Excisional debridement of fascia of L Knee was performed during procedure on   20.     Query created by: Lisseth Atkins on 8/3/2020 7:27 AM      Electronically signed by:  Casper White MD 2020 6:32 AM

## 2020-08-04 NOTE — PLAN OF CARE
Problem: Falls - Risk of:  Goal: Will remain free from falls  Description: Will remain free from falls  Outcome: Ongoing  Note: Patient free from falls this shift. Fall precautions in place at all times. Bed in lowest position with two side rails up and wheels locked. Call light within reach. Patient able and agreeable to contact for safety appropriately. Problem: PAIN  Goal: Patient's pain/discomfort is manageable  Outcome: Ongoing  Note: No complaints of pain verbalized. Patient encouraged to inform staff of any changes in condition, pain, or discomfort. Problem: SKIN INTEGRITY  Goal: Skin integrity is maintained or improved  Outcome: Ongoing  Note: Skin assessment performed each shift per protocol. Patient can turn and reposition self. Patient is continent of bowel and bladder. Surgical site is without s/s of infection.

## 2020-08-04 NOTE — PROGRESS NOTES
with Dr. Deepak Galaviz). Now WBAT. Course complicated by MARILIA and mild anemia. Currently, patient reports mild left knee pain. Worse with activity and weight bearing. Improves with rest and medication. She has baseline numbness/tingling in bilateral distal lower extremities but no new tingling/numbness or focal weakness. She would like to come to ARU to improve her function prior to returning home. (copied per note Dr Eleazar Baker 8/3/2020)  Exam: bathing, dressing, toileting, transfers, mobility, UE, cognition, activity tolerance  Assistance / Modification: SBA bathing, CGA LB dressing, Transfers CGA rolling walker, shower chair, grab bar  OT Education: OT Role;Transfer Training;Plan of Care;Precautions; ADL Adaptive Strategies; Energy Conservation  Activity Tolerance  Activity Tolerance: Patient Tolerated treatment well  Safety Devices  Safety Devices in place: Yes  Type of devices: Gait belt;Call light within reach; Chair alarm in place; Left in chair;Nurse notified           Patient Diagnosis(es): There were no encounter diagnoses. has a past medical history of Asthma, Cancer ([facility] [address] CKD (chronic kidney disease), Depression, GERD (gastroesophageal reflux disease), Neuropathy, Osteoporosis, Pernicious anemia, and Scoliosis. has a past surgical history that includes  section;  section; and knee surgery (Left, 2020).     Treatment Diagnosis: decreased ADL/IADL, balance, activity tolerance      Restrictions  Restrictions/Precautions  Restrictions/Precautions: Fall Risk, Weight Bearing  Lower Extremity Weight Bearing Restrictions  Left Lower Extremity Weight Bearing: Weight Bearing As Tolerated    Subjective   General  Chart Reviewed: Yes, Orders, Progress Notes, History and Physical, Previous Admission  Patient assessed for rehabilitation services?: Yes  Additional Pertinent Hx: Patient is an 81 yo F with pmh Asthma, CKD, Depression, GERD, Neuropathy, Osteoporosis, Scoliosis, and Pernicious anemia who initially presented 7/31/2020 with left knee pain s/p fall. Patient reports losing her balance while reaching for garage door and falling onto knee. No head trauma or LOC. Found to have deep left knee laceration. Underwent I&D and laceration repair (7/31 with Dr. Ney Nguyen). Now WBAT. Course complicated by MARILIA and mild anemia. Currently, patient reports mild left knee pain. Worse with activity and weight bearing. Improves with rest and medication. She has baseline numbness/tingling in bilateral distal lower extremities but no new tingling/numbness or focal weakness. She would like to come to ARU to improve her function prior to returning home. (copied per note Dr Meet Pino 8/3/2020)  Family / Caregiver Present: No  Referring Practitioner: Justine/Miriam  Diagnosis: L knee laceration - surgically repaired 7/31/2020  Subjective  Subjective: Pt met bedside, agreeable to OT, just returning from bathroom with nursing, using walker    Social/Functional History  Social/Functional History  Lives With: Spouse( Magdy- good health without device)  Type of Home: [address] [place] [address],[address]: Multi-level(3 floors 7 steps to bedroom and bathroom, tri level)  Home Access: Stairs to enter with rails  Entrance Stairs - Number of Steps: 7 from living room to bed room level with bilateral railing, 14 total steps to get to bedroom and bathroom, garge to enter level entry into living without bathroom  Entrance Stairs - Rails: Both  Bathroom Shower/Tub: Walk-in shower, Shower chair with back, Curtain  Bathroom Toilet: Standard(one toliet is taller in the othe bathroom)  Bathroom Equipment: Shower chair, Grab bars in shower(no prior toilet equipment)  Bathroom Accessibility: Walker accessible  Home Equipment: 4 wheeled walker, Rolling walker, Karina Dumont, Grab bars, [employer] Help From: Family  ADL Assistance: Independent  Homemaking Assistance: Independent(some cooking and cleaning, shares responsibility with )  Homemaking Responsibilities: Yes(Does need assist wit grocery shopping)  Ambulation Assistance: Independent(states she uses 4 wh walker when outside, furniture walker inside the home stating RW does no fit)  Transfer Assistance: Independent(standard bed)  Active : Yes  Mode of Transportation: Car  Occupation: Retired  Type of occupation: [employer] [address]: takes care of the house, enjoys seeing grandchildren (4), but hasnt seen them recently due to the pandemic  IADL Comments:  and patient share the homemaking  Additional Comments: Reports having decreased balance for about a month, states she has noticed her handwriting getting worse, but does not discussed it with the doctor       Objective   Vision: Impaired  Vision Exceptions: Wears glasses at all times  Hearing: Exceptions to [facility]  Hearing Exceptions: Hard of hearing/hearing concerns    Orientation  Overall Orientation Status: Within Functional Limits    Balance  Sitting Balance: Independent  Functional Mobility  Functional - Mobility Device: Rolling Walker  Activity: To/from bathroom  Assist Level: Contact guard assistance  Functional Mobility Comments: moves cautiously with rolling walker, cues to stand closer to walker  Toilet Transfers  Toilet - Technique: Ambulating  Equipment Used: Grab bars  Toilet Transfer: Contact guard assistance  Shower Transfers  Shower - Transfer Type: To and From  Shower - Transfer To:  Shower seat with back  Shower - Technique: Ambulating  Shower Transfers: Contact Guard  Shower Transfers Comments: grab bar  Wheelchair Bed Transfers  Wheelchair/Bed - Technique: Ambulating  Equipment Used: Bed;Wheelchair  Level of Asssistance: Contact guard assistance  ADL  Feeding: Independent  Grooming: Stand by assistance(brushing teeth standing at sink)  UE Bathing: Setup  LE Bathing: Stand by assistance(seated on shower chair for majority of shower, grab bar for stance to bathe buttocks walk better,more confidence with walking\"       Therapy Time   Individual Concurrent Group Co-treatment   Time In 0815         Time Out 0945         Minutes 90         Timed Code Treatment Minutes: 75 Minutes(+15 min eval)       Dipak Pena, OTR/L 200

## 2020-08-04 NOTE — PLAN OF CARE
ARU PATIENT TREATMENT PLAN  [address] [address] [address] [facility]  [address]  [phone]    Mikki Lockett    : 1935  Acct #: [de-identified]  MRN: [id number]   PHYSICIAN:  Noman Lui MD    Rehabilitation Diagnosis:    Left knee   -s/p I&D and repair ( with Dr. Soto Balloon  -Wound care  -WBAT  -PT/OT     Acute on chronic anemia  -Blood loss + h/o Pernicious anemia  -Monitor Hgb, transfuse prn <7  -Check B12     MARILIA on CKD  -Avoid nephrotoxins, renally dose meds  -Monitor BMP Mon/Thurs     HTN  -triamterene-HCTZ     Asthma  -Symbicort, prn duoneb     GERD  -Pantroprazole, prn TUMS     Osteoporosis  -Calcium+Vitamin D supplement     H/o breast cancer  -Anastrazole     Depression/Anxiety  -Venlafaxine, prn clonazepam     Idiopathic Neuropathy  -Contributing to fall  -Check B12  -PT/OT      Bladder   -High risk retention   -Monitor PVRs, SC prn >300cc     Bowel   -High risk constipation   -senna+colace BID, PRN miralax, MoM, and bisacodyl supp.     Pain control  -Gabapentin, PRN acetaminophen     PPx  -DVT: lovenox  -GI: pantoprazole    ADMIT DATE:8/3/2020    Patient Goals: To return home with spouse    Admitting Impairments: decreased left knee ROM, decreased balance, baseline BLE sensory deficit    Barriers:  decreased left knee ROM, decreased balance, baseline BLE sensory deficit, medical comorbidities    Participation: patient lives with , is independent and takes care of the house, enjoys seeing grandchildren (4), but hasnt seen them recently due to the pandemic.   She reports having decreased balance for about a month, states she has noticed her handwriting getting worse, but does not discussed it with the doctor     CARE PLAN     NURSING:  Mikki Lockett while on this unit will:     [x] Be continent of bowel and bladder     [x] Have an adequate number of bowel movements  [x] Urinate with no urinary retention >300ml in bladder  [] Complete bladder protocol with hauser removal  [x] Maintain O2 SATs at 92%  [x] Have pain managed while on ARU       [] Be pain free by discharge   [x] Have no skin breakdown while on ARU  [] Have improved skin integrity via wound measurements  [x] Have no signs/symptoms of infection at the incision site  [x] Be free from injury during hospitalization   [x] Complete education with patient/family with understanding demonstrated for:  [x] Adjustment   [x] Other:   Nursing interventions may include bowel/bladder training, education for medical assistive devices, medication education, O2 saturation management, energy conservation, stress management techniques, fall prevention, alarms protocol, seating and positioning, skin/wound care, pressure relief instruction,dressing changes,  infection protection, DVT prophylaxis, and/or assistance with in room safety with transfers to bed, toilet, wheelchair, shower as well as bathroom activities and hygiene. Patient/caregiver education for:   [x] Disease/sustained injury/management      [x] Medication Use   [x] Surgical intervention (I and D)   [x] Safety   [x] Body mechanics and or joint protection   [x] Health maintenance         PHYSICAL THERAPY:  Goals:                  Short term goals  Time Frame for Short term goals: 5-7 days  Short term goal 1: bed mobility mod I  Short term goal 2: transfer sit <-> stand mod I  Short term goal 3: ambulate with LRAD 150' mod I  Short term goal 4: ascend/descend 12 steps with bilateral railing mod I  Short term goal 5: ascend/decend curb step with LRAD mod I            Long term goals  Time Frame for Long term goals : STG=LTG  These goals were reviewed with this patient at the time of assessment and Viraj Brush is in agreement. Plan of Care: Pt to be seen 90   mins per day for 5 day/week 5-7 days.                    Current Treatment Recommendations: Strengthening, Endurance Training, Balance Training, Stair training, Gait Training, Home Exercise Program, Functional Mobility Training, Transfer Training, Neuromuscular Re-education, Safety Education & Training, Equipment Evaluation, Education, & procurement      OCCUPATIONAL THERAPY:  Goals:             Short term goals  Time Frame for Short term goals: 1 week pt will. Short term goal 1: bathe indep with AD  Short term goal 2: dress indep with AD as needed  Short term goal 3: toilet indep with AD as needed  Short term goal 4: transfer indep with AD  Short term goal 5: functional mobility and simple meal prep indep with AD  Short term goal 6: improve activity tolerance to stand 6 min for IADL task :  Long term goals  Time Frame for Long term goals : same as stg : These goals were reviewed with this patient at the time of assessment and Mohan Robles is in agreement    Plan of Care:  Pt to be seen 90   mins per day for 5-6 day/week 5-7 days            SPEECH THERAPY: Goals will be left blank if speech is not following this patient. Goals:                          CASE MANAGEMENT:  Goals:   Assist patient/family with discharge planning, patient/family counseling,   and coordination with insurance during ARU stay. Admission Period/Goal QIM CODES   QIM  Admit/Goal Score    Eating  6/6   Oral Hygiene  4/6   [address]  4/6   Shower/Bathe Self  4/6   Upper Body Dressing  5/6   Lower Body Dressing  4/6   Putting on/Taking off Footwear  5/6   Roll Left and Right  4/6   Sit to Lying  4/6   Lying to Sitting on Side of Bed  4/6   Sit to Stand  4/6   Chair/Bed to Chair Transfer  4/6   Toilet Transfer  4/6   Car Transfer  4/6   Walk 10 feet  4/6   Walk 50 feet with 2 Turns  4/6   Walk 150 feet with 2 turns  4/6   Walk 10 feet on Uneven Surfaces  4/6   1 Step  4/6   4 Steps  3/6   12 Steps  3/6   Picking up Object  4/6   Wheel 50 feet with 2 Turns   Type?  na   Wheel 150 feet with 2 Turns   Type?  na          Mohan Robles will be seen a minimum of 3 hours of therapy per day, a minimum of 5 out of 7 days per week. [] In this rare instance due to the nature of this patient's medical involvement, this patient will be seen 15 hours per week (900 minutes within a 7 day period). Treatments may include therapeutic exercises, gait training, neuromuscular re-ed, transfer training, community reintegration, bed mobility, w/c mobility and training, self care, home mgmt, cognitive training, energy conservation,dysphagia tx, speech/language/communication therapy, group therapy, and patient/family education. In addition, dietician/nutritionist may monitor calorie count as well as intake and collaboratively work with SLP on dietary upgrades. Neuropsychology/Psychology may evaluate and provide necessary support. Medical issues being managed closely and that require 24 hour availability of a physician:   [] Swallowing Precautions  [x] Bowel/Bladder Fx  [] Weight bearing precautions   [x] Wound Care    [x] Pain Mgmt   [x] Infection Protection   [x] DVT Prophylaxis   [x] Fall Precautions  [x] Fluid/Electrolyte/Nutrition Balance   [] Voice Protection   [] Respiratory  [] Other:    Medical Prognosis: [x] Good  [] Fair    [] Guarded   Total expected IRF days 7  Anticipated discharge destination:    [x] Home Independently   [] Home Modified Independent  [] Home with supervision    []SNF     [] Other                                           Physician anticipated functional outcomes:  Improve gait, transfers, self care to independent with DME as needed to allow safe return home with     IPOC brief synthesis: Patient is an 83 yo F with pmh Asthma, CKD, Depression, GERD, Neuropathy, Osteoporosis, Scoliosis, and Pernicious anemia who initially presented 7/31/2020 with left knee pain s/p fall. Patient reports losing her balance while reaching for garage door and falling onto knee. No head trauma or LOC. Found to have deep left knee laceration. Underwent I&D and laceration repair (7/31 with Dr. Reba Dunham). Now WBAT.  Course complicated by MARILIA and mild anemia. Now presents to ARU with impairments including decreased left knee ROM, balance, endurance, safety awareness. She requires comprehensive inpatient rehab program in order to return to community setting. I have reviewed this initial plan of care and agree with its contents:    Title   Name    Date    Time    Physician: Tammy Coyle.  Mary Fish MD 8/5/2020, 2:54 PM       Case Mgmt:   [place], [place]     Case Management   [phone]    8/5/2020  1:35 PM      OT: Edin Motley OTR/L  #[id number] 8/4/20 4:18 PM      PT: Electronically signed by Cesar Zhao, PT on 8/4/20 at 12:41 PM EDT      RN: Cam Malin RN,CRRN 08/05/2020 9:48 am    ST:    :  Piedad Sunshine, IVY 8/5/2020  1440    Other:

## 2020-08-04 NOTE — CONSULTS
Nutrition Assessment     Type and Reason for Visit: Consult(new to ARU.)    Nutrition Recommendations/Plan:   Continue General diet. Continue Ensure BID. Nutrition Assessment:  Pt presented to ARU with left knee pain following a fall. Pt had laceration and underwent I&D during acute stay. Pt with no recent wt loss and good PO intakes , even during acute stay. Eating >75% at all meals. Nutrition suplement on board, will continue.     Malnutrition Assessment:  Malnutrition Status:  No malnutrition    Nutrition Related Findings: BM 7/29      Current Nutrition Therapies:    DIET GENERAL;  Dietary Nutrition Supplements: Standard High Calorie Oral Supplement    Anthropometric Measures:  · Height: 5' 4\" (162.6 cm)  · Current Body Wt: 158 lb (71.7 kg)   · BMI: 27.1    Nutrition Diagnosis:   No nutrition diagnosis at this time     Nutrition Interventions:   Food and/or Nutrient Delivery:  Continue Current Diet, Continue Oral Nutrition Supplement  Nutrition Education/Counseling:  No recommendation at this time   Coordination of Nutrition Care:  Continued Inpatient Monitoring      Electronically signed by Mitchell Dickerson RD, LD on 8/4/20 at 9:38 AM EDT    Contact: [phone]

## 2020-08-04 NOTE — PATIENT CARE CONFERENCE
[facility] Rehabilitation  Weekly Team Conference Note      Date: 2020  Patient Name:  Mikki Lockett    MRN: [id number]  : 1935  Gender: Female  Physician: Dr. Henri Evans   Diagnosis: Knee laceration, left, initial encounter [S81.012A]    CASE MANAGEMENT  Assessment:   Goal is home. PHYSICAL THERAPY    Bed mobility  Rolling to Left: Stand by assistance  Rolling to Right: Stand by assistance  Supine to Sit: Stand by assistance  Sit to Supine: Contact guard assistance  Scooting: Contact guard assistance  Comment: increased time to perform all functional mobility tasks, sitting EOB pillows to patient left at this time with increased time to perform. HOB flat without railing    Transfers:  Sit to Stand: Contact guard assistance  Stand to sit: Stand by assistance  Bed to Chair: Stand by assistance(short distance in room from bed to recliner)  Comment: multiple surfaces for transfer including EOB without railing, w/c with arm rests, commode with BSC over for raised seat with grab bars, and recliner chair, increased time with cues for hand placement for safety throughout. WB Status: WBAT  Ambulation 1  Surface: level tile  Device: Rolling Walker  Assistance: Contact guard assistance  Quality of Gait: slow, step to pattern w/ decreased step length and height. good control of RW  Gait Deviations: Slow Sindi, Decreased step length, Decreased step height  Distance: 200' with multiple turns X 2 trials, short distances in room. Comments: pt tolerates well with only mild fatigue         Ascend/descend step 6\" height X 12 steps with right railing initially min assist due to attempt reciprocal pattern needing cues to changes to nonreciprocal pattern progressing to CGA with practice, descending CGA throughout with cues for safety increased time. Pt with increased fatigue with performing steps at this time needing increased time to recover. Right railing ascending throughout. Ascend/descend curb step with RW 6\" height CGA with VC for proper sequencing, slight impulsive needing cues for safety throughout. Car Transfer: Contact guard assistance(Mock car transfer cues for hand placement throughout/proper sequencing for safety, increased time to perform)      Assessment: Pt 83 y/o female with inability to return home thus admitted to IP rehab due to functioning below PLOF needing increased assistance to complete all functional mobility tasks. Pt reports history of frequent falls that progressively has gotten worse over the past 3 months. THis date patient able to ambulate with RW community distances with CGA multiple times. Functional sit <-> stand transfers with cues for safety mod I. Pt complains of feeling off balance but without any LOB at this time. Increased time to complete and recover from all mobility tasks. Prior to admission patient reports independence with all functional mobility tasks including furniture ambulation at home. Patient benefit from IP rehab to promote increased safety awareness with all functional mobility tasks, increased strength, decreased assistance for mobility tasks, improve balance to decrease risk for falls, and promote safe return to home with assistance PRN. SPEECH THERAPY (intentionally left blank if not actively being seen by this service):      OCCUPATIONAL THERAPY    ADL  Feeding: Independent  Grooming: Stand by assistance(brushing teeth standing at sink)  UE Bathing: Setup  LE Bathing: Stand by assistance(seated on shower chair for majority of shower, grab bar for stance to bathe buttocks with SBA)  UE Dressing: Setup  LE Dressing: Contact guard assistance(able to thread brief/pants over feet, hips with light CGA.   slipper socks per self)  Toileting: Stand by assistance, Contact guard assistance, Setup(managed pants down/up with CGA/SBA, hygiene after urination seated)  Additional Comments: wet towel for non skid surface in shower    Bed mobility  Rolling to Left: Stand by assistance  Rolling to Right: Stand by assistance  Supine to Sit: Stand by assistance  Sit to Supine: Contact guard assistance  Scooting: Contact guard assistance  Comment: increased time to perform all functional mobility tasks, sitting EOB pillows to patient left at this time with increased time to perform. HOB flat without railing    Functional Transfers: Toilet Transfers  Toilet - Technique: Ambulating  Equipment Used: Grab bars  Toilet Transfer: Contact guard assistance     Shower Transfers  Shower - Transfer Type: To and From  Shower - Transfer To: Shower seat with back  Shower - Technique: Ambulating  Shower Transfers: Contact Guard  Shower Transfers Comments: grab bar              UE Function:      Assessment: Pt is an 83 yo female admitted with laceration to left knee, surgically repaired by Dr Chintan Roman, now WBAT. She was indep PTA, lives with spouse and was able to complete all ADL, transfers and mobility indep, she and spouse share IADL's. Pt is currently functioning below her baseline in above areas, requiring SBA for bathing, seated on shower chair ,Dressed UB after set up, LB with light CGA in stance. Transfers and mobility require CGA using rolling walker for balance.  Cognition appears to be functional, UE functional witih minimal weakness right shoulder and min complaint of incoordination right hand as PTA(pt notices difficulty with hand writing and feeding herself with min tremor, but has not been evaluated by physician PTA Anticipate pt will require 5-7 days inpt rehab with skilled OT services to maximize independence for safe return home with spouse      NUTRITION  Diet Order: DIET GENERAL;  Dietary Nutrition Supplements: Standard High Calorie Oral Supplement  PO Meals Eaten (%): 76 - 100%  Most recent weightWeight: 158 lb 11.7 oz (72 kg)  BSA (Calculated - sq m): 1.8 sq meters  BMI (Calculated): 27.3  Please see nutrition note for details. NURSING  Continent of bladder, monitor bowel, last bm 07/31. Monitor and maintain skin integrity, incision site. Family Education: Patient education: skin and incision care, medications, bowel education to prevent constipation, pain control, safety and fall prevention. MEDICAL  Monitor Hgb   Monitor renal function   Work-up neuropathy    TEAM SUMMARY AND DISCHARGE PLAN  Estimated Length of Stay: DC 3-4 days   Destination: home with  and home care   · Anticipated Services at Discharge:    [] OT  [x] PT   [] SLP    [x] RN   [] Home Health aide [] SW  Community Resources: _______________________________  Equipment recommendations:  [] Hospital bed [] Tub bench  [] Shower chair [] Hand held shower  [] Raised toilet seat [] Toilet safety frame [] Bedside commode   [] W/C: _____  [] Rolling Walker [] Standard walker [] Gait belt [] cane: _________  [] Sliding board [] Alternate seating/furniture [] O2 [] Hip Kit: _______  [] Life Line [] Other:  NA_______  Factors facilitating achievement of predicted outcomes: Family support, Motivated, Cooperative and Pleasant  Barriers to the achievement of predicted outcomes/Interventions: impaired higher level balance with frequent falls- strengtheing, nnmjjgtgfzrnc-am-vzrmcyinw, balance and coordination training, compensatory strategies for safe self care, mobility and falls prevention      Interdisciplinary Individualized Plan of Care Review:    · Continue Current Plan of Care: Yes    · Modifications:_____________________________    Special Needs in the Upcoming Week :    [] Family/Caregiver Education  [] Home visit  []Therapeutic Pass   [] Consults:_______    [] Other;_______    Patient Rehab Team Goals for the Upcoming Week:  1. Improve safety with standing mobility tasks using LRAD to promote safe return to home while decrease fall risk.    2. Patient goals : \"I want to be able to walk better,more confidence with walking\"            Team Members Present at Conference:  Physician: Dr Inna Pickens  : Ashley Copeland    Occupational Therapist: Ambreen Pappas, OTR/L [id number]  Physical Therapist: Thelma Limon, PT #[id number]  Speech Therapist:   Nurse: Julianne Coy RN, CRRN  Dietician: Rocky Valenzuela, RD, LD   Jacqueline Vargas, [facility]       I led this team conference and I approve the established interdisciplinary plan of care as documented within the medical record of [facility]. MD: Nga Andrade.  Inna Pickens MD 8/5/2020, 2:54 PM

## 2020-08-04 NOTE — PROGRESS NOTES
Patient is AAO x 4, pleasant, and cooperative with care. Admitted to ARU after undergoing I&D with laceration repair to left knee after a fall. Patient moves well, ambulates with walker/gait belt and 1 assist - tolerates well. Has denied pain or discomfort this evening, encouraged to inform staff of any changes in condition, pain, or discomfort. Took medications whole in thin liquids without difficulty. Left knee has silver dressing intact and covered with ace wrap. Ace wrap removed and surgical area is bruised without s/s of infection. Receives respiratory treatments for history of asthma, lungs were clear to auscultation and patient denied cough or sob. Fall precautions in place. Call light and personal belongings are within reach. Will continue to monitor and assist as needed.

## 2020-08-05 PROCEDURE — 6370000000 HC RX 637 (ALT 250 FOR IP): Performed by: PHYSICAL MEDICINE & REHABILITATION

## 2020-08-05 PROCEDURE — 94640 AIRWAY INHALATION TREATMENT: CPT

## 2020-08-05 PROCEDURE — 94760 N-INVAS EAR/PLS OXIMETRY 1: CPT

## 2020-08-05 PROCEDURE — 97530 THERAPEUTIC ACTIVITIES: CPT

## 2020-08-05 PROCEDURE — 97116 GAIT TRAINING THERAPY: CPT

## 2020-08-05 PROCEDURE — 6360000002 HC RX W HCPCS: Performed by: PHYSICAL MEDICINE & REHABILITATION

## 2020-08-05 PROCEDURE — 97110 THERAPEUTIC EXERCISES: CPT

## 2020-08-05 PROCEDURE — 1280000000 HC REHAB R&B

## 2020-08-05 PROCEDURE — 97535 SELF CARE MNGMENT TRAINING: CPT

## 2020-08-05 RX ADMIN — CALCIUM CARBONATE-CHOLECALCIFEROL TAB 250 MG-125 UNIT 250 MG: 250-125 TAB at 17:05

## 2020-08-05 RX ADMIN — PANTOPRAZOLE SODIUM 40 MG: 40 TABLET, DELAYED RELEASE ORAL at 06:08

## 2020-08-05 RX ADMIN — CLONAZEPAM 0.5 MG: 0.5 TABLET ORAL at 21:46

## 2020-08-05 RX ADMIN — Medication 250 MG: at 08:38

## 2020-08-05 RX ADMIN — DOCUSATE SODIUM 50 MG AND SENNOSIDES 8.6 MG 1 TABLET: 8.6; 5 TABLET, FILM COATED ORAL at 21:46

## 2020-08-05 RX ADMIN — CALCIUM CARBONATE-CHOLECALCIFEROL TAB 250 MG-125 UNIT 250 MG: 250-125 TAB at 08:38

## 2020-08-05 RX ADMIN — VENLAFAXINE HYDROCHLORIDE 150 MG: 75 CAPSULE, EXTENDED RELEASE ORAL at 08:38

## 2020-08-05 RX ADMIN — POLYETHYLENE GLYCOL 3350 17 G: 17 POWDER, FOR SOLUTION ORAL at 21:46

## 2020-08-05 RX ADMIN — ENOXAPARIN SODIUM 40 MG: 40 INJECTION SUBCUTANEOUS at 08:38

## 2020-08-05 RX ADMIN — DOCUSATE SODIUM 50 MG AND SENNOSIDES 8.6 MG 1 TABLET: 8.6; 5 TABLET, FILM COATED ORAL at 08:38

## 2020-08-05 RX ADMIN — TRIAMTERENE AND HYDROCHLOROTHIAZIDE 2 TABLET: 37.5; 25 TABLET ORAL at 08:38

## 2020-08-05 RX ADMIN — BUDESONIDE AND FORMOTEROL FUMARATE DIHYDRATE 2 PUFF: 160; 4.5 AEROSOL RESPIRATORY (INHALATION) at 08:25

## 2020-08-05 RX ADMIN — BUDESONIDE AND FORMOTEROL FUMARATE DIHYDRATE 2 PUFF: 160; 4.5 AEROSOL RESPIRATORY (INHALATION) at 21:04

## 2020-08-05 RX ADMIN — GABAPENTIN 200 MG: 100 CAPSULE ORAL at 21:46

## 2020-08-05 RX ADMIN — ANASTROZOLE 1 MG: 1 TABLET ORAL at 08:38

## 2020-08-05 ASSESSMENT — PAIN SCALES - GENERAL: PAINLEVEL_OUTOF10: 0

## 2020-08-05 NOTE — PROGRESS NOTES
Physical Therapy  Facility/Department: [address] IP REHAB  Daily Treatment Note  NAME: Gina Davis  : 1935  MRN: [id number]    Date of Service: 2020    Discharge Recommendations:  Home with assist PRN, S Level 1, Home with Home health PT   PT Equipment Recommendations  Equipment Needed: No  Other: pt owns RW, rollator, w/c, and SPC    Assessment   Body structures, Functions, Activity limitations: Decreased functional mobility ; Decreased balance;Decreased strength;Decreased endurance;Decreased ROM  Assessment: Pt with increased fatigue this date but participates well with therapy at this time, continues to ambulate with RW community distances with SBA increased time. Pt perform transfers supervision-SBA with constant cues for safety awareness. Progressing to standing exercises but limited due to fatigue with bilateral hips needing increased time t ocomplete. Patient benefit from IP rehab to promote increased safety awareness with all functional mobility tasks, increased strength, decreased assistance for mobility tasks, improve balance to decrease risk for falls, and promote safe return to home with assistance PRN. Treatment Diagnosis: Difficulty with higher level functional tasks due to balance impairments  Prognosis: Good  PT Education: Transfer Training; Adaptive Device Training;Equipment;General Safety;Gait Training;Functional Mobility Training  Patient Education: use of RW at all times and for discharge at this time  Barriers to [address] UP: Yes  Activity Tolerance  Activity Tolerance: Patient Tolerated treatment well;Patient limited by endurance  Activity Tolerance: Pt denies pain throughout treatment session but reports mild fatigue/off balance wit hstanding exercises     Patient Diagnosis(es): There were no encounter diagnoses.      has a past medical history of Asthma, Cancer ([facility] [address] CKD (chronic kidney disease), Depression, GERD (gastroesophageal reflux disease), Neuropathy, Osteoporosis, Pernicious anemia, and Scoliosis. has a past surgical history that includes  section;  section; and knee surgery (Left, 2020).    Social/Functional History  Lives With: Spouse( Magdy- good health without device)  Type of Home: House  Home Layout: Multi-level(3 floors 7 steps to bedroom and bathroom, tri level)  Home Access: Stairs to enter with rails  Entrance Stairs - Number of Steps: 7 from living room to bed room level with bilateral railing, 14 total steps to get to bedroom and bathroom, garge to enter level entry into living without bathroom  Entrance Stairs - Rails: Both  Bathroom Shower/Tub: Walk-in shower, Shower chair with back, Curtain  Bathroom Toilet: Standard(one toliet is taller in the othe bathroom)  Bathroom Equipment: Shower chair, Grab bars in shower(no prior toilet equipment)  Bathroom Accessibility: Walker accessible  Home Equipment: 4 wheeled walker, Rolling walker, Ethan Starkey, [employer], [employer]  Receives Help From: Family  ADL Assistance: Independent  Homemaking Assistance: Independent(some cooking and cleaning, shares responsibility with )  Homemaking Responsibilities: Yes(Does need assist wit grocery shopping)  Ambulation Assistance: Independent(states she uses 4 wh walker when outside, furniture walker inside the home stating RW does no fit)  Transfer Assistance: Independent(standard bed)  Active : Yes  Mode of Transportation: Car  Occupation: Retired  Type of occupation: [email or web address]: takes care of the house, enjoys seeing grandchildren (4), but hasnt seen them recently due to the pandemic  IADL Comments:  and patient share the homemaking  Additional Comments: Reports having decreased balance for about a month, states she has noticed her handwriting getting worse, but does not discussed it with the doctor    Restrictions  Restrictions/Precautions  Restrictions/Precautions: Fall Risk, Weight Bearing  Lower Extremity Weight Bearing Restrictions  Left Lower Extremity Weight Bearing: Weight Bearing As Tolerated  Subjective   General  Chart Reviewed: Yes  Additional Pertinent Hx: \"81 yo F with pmh Asthma, CKD, Depression, GERD, Neuropathy, Osteoporosis, Scoliosis, and Pernicious anemia who initially presented 7/31/2020 with left knee pain s/p fall. Patient reports losing her balance while reaching for garage door and falling onto knee. No head trauma or LOC. Found to have deep left knee laceration. Underwent I&D and laceration repair (7/31 with Dr. Ney Nguyen). Now WBAT. Course complicated by MARILIA and mild anemia. Currently, patient reports mild left knee pain. Worse with activity and weight bearing. Improves with rest and medication. She has baseline numbness/tingling in bilateral distal lower extremities but no new tingling/numbness or focal weakness. She would like to come to ARU to improve her function prior to returning home. \" (Dr. Meet Pino 8/3/2020)  Response To Previous Treatment: Patient with no complaints from previous session. Family / Caregiver Present: No  Referring Practitioner: Dr. Allegra Rios: Pt without complaints this afternoon, Pt reports increased fatigue this afternoon but very pleasant and eager to participate with therapy. Pt agreeable to treatment session.  Denies pain at this time  General Comment  Comments: Sitting in recliner at beginning of session          Orientation  Orientation  Overall Orientation Status: Within Normal Limits  Cognition      Objective      Transfers  Sit to Stand: Stand by assistance(increased time with cues for safety)  Stand to sit: Supervision(cues for turning and lining up properly with RW, cues for hand placement with increased time)  Comment: multiple surfaces including w/c with arm rests and recliner  Ambulation  Ambulation?: Yes  WB Status: WBAT  Ambulation 1  Surface: level tile  Device: Rolling Walker  Assistance: Stand by assistance  Quality of Gait: slow, step to pattern w/ decreased step length and height. good control of RW, mulitple cues to improve posture overall. Gait Deviations: Slow Sindi;Decreased step length;Decreased step height  Distance: recliner to w/c in room, 230' X 2 trials with multiple turns, short distances in therapy gym (w/c <-> steps, w/c <-> nustep), 180' with two turns  Comments: pt tolerates well with only mild fatigue  Stairs/Curb  Stairs?: Yes  Stairs  # Steps : 12  Stairs Height: 6\"  Rails: Right ascending  Curbs: 6\"(X 1 trial with RW SBA-CGA with cues for proper sequencing and safety throughout with increased time)  Device: No Device  Assistance: Stand by assistance;Contact guard assistance  Comment: primary SBA but occasionally CGA with significant weakness with BLEs causing [place] gait with slight decreased clearance of BLEs with cues for safety, recalls recommendations with nonreciprocal pattern with increased time to perform. Balance  Posture: Good  Sitting - Static: Good  Sitting - Dynamic: Good  Standing - Static: Good;-  Standing - Dynamic: Good;-  Comments: use of RW for stability throughout  Exercises  Heelslides: HS curl x10 one leg at a time  Gluteal Sets: mini squats X 10  Hip Flexion: X 10 marching alternating  Hip Extension/Leg Presses: knee straight X 10 one leg at a time  Hip Abduction: X 10 hip abduction knee straight one leg at time  Ankle Pumps: X 10 B heel raises  Comments: above exercises performed standing at RW BLEs with SBA-CGA for balance with cues for safety throughout with cues for proper technique, cue to improve erect posture throughout for safety.  requires two seated rest breaks due to increased fatigue  Other exercises  Other exercises?: Yes  Other exercises 1: NuStep 10 minutes, seat 9, BUE 9, level 2 resistance, average SPM: 21 .Pt denies pulling on incision and feels well throughout without complaints. Other exercises 2: Seated exercises BLEs supported sitting in w/c X 20 each including heel/toe raises, LAQ, marching, hip adduction sets, glut sets, hip abduction red t-band, and HS curls red t-band. Increased time to complete due to increased fatigue. Goals  Short term goals  Time Frame for Short term goals: 5-7 days  Short term goal 1: bed mobility mod I  Short term goal 2: transfer sit <-> stand mod I  Short term goal 3: ambulate with LRAD 150' mod I  Short term goal 4: ascend/descend 12 steps with bilateral railing mod I  Short term goal 5: ascend/decend curb step with LRAD mod I  Long term goals  Time Frame for Long term goals : STG=LTG  Patient Goals   Patient goals : \"walk right again, to be strong to be able to work in yard again to plant a flower. Be able to go to store.  Be strong again so I don't hurt\"    Plan    Plan  Times per week: 5-6X  Times per day: Twice a day  Plan weeks: 1  Current Treatment Recommendations: Strengthening, Endurance Training, Balance Training, Stair training, Gait Training, Home Exercise Program, Functional Mobility Training, Transfer Training, Neuromuscular Re-education, Safety Education & Training, Equipment Evaluation, Education, & procurement  Safety Devices  Type of devices: Left in chair, Patient at risk for falls, All fall risk precautions in place, Gait belt(sitting in wheelchair care transition Gina OT at completion of session)  Restraints  Initially in place: No     Therapy Time   Individual Concurrent Group Co-treatment   Time In 1300         Time Out 1430         Minutes 90         Timed Code Treatment Minutes: [facility], PT     Electronically signed by Deepthi Batista PT on 8/5/20 at 2:33 PM EDT

## 2020-08-05 NOTE — PROGRESS NOTES
Department of Physical Medicine & Rehabilitation  Progress Note    Patient Identification:  Yaya Webber  [id number]  : 1935  Admit date: 8/3/2020    Chief Complaint: Knee laceration, left, initial encounter    Subjective:   No acute events overnight. Patient seen this am sitting up in room. She reports therapy going well. Pain controlled. Sleep fair. ROS: No f/c, n/v, cp     Objective:  Patient Vitals for the past 24 hrs:   BP Temp Temp src Pulse Resp SpO2 Weight   20 0831 -- -- -- -- 16 -- --   20 0827 -- -- -- -- (!) 106 93 % --   20 0600 (!) 143/74 97.4 °F (36.3 °C) Oral 76 16 92 % 157 lb 3 oz (71.3 kg)   20 2145 124/70 98.7 °F (37.1 °C) Oral 71 16 94 % --   20 -- -- -- -- 16 93 % --   20 1526 129/70 98.1 °F (36.7 °C) Oral 77 16 93 % --     Const: Alert. No distress, pleasant. HEENT: Normocephalic, atraumatic. Normal sclera/conjunctiva. MMM. CV: Regular rate and rhythm. Resp: No respiratory distress. Lungs CTAB. Abd: Soft, nontender, nondistended, NABS+   Ext: No edema. MSK: Left knee ROM decreased, dressed with ace wrap, c/d/i  Neuro: Alert, oriented, appropriately interactive. Psych: Cooperative, appropriate mood and affect    Laboratory data: Available via EMR. Last 24 hour lab  No results found for this or any previous visit (from the past 24 hour(s)).     Therapy progress:  PT     Objective     Sit to Stand: Stand by assistance(increased time with cues for safety)  Stand to sit: Supervision(cues for turning and lining up properly with RW, cues for hand placement with increased time)  Bed to Chair: Stand by assistance(short distance in room from bed to recliner)  Device: [address]: Stand by assistance  Distance: recliner to w/c in room, 230' X 2 trials with multiple turns, short distances in therapy gym (w/c <-> steps, w/c <-> nustep), 180' with two turns  OT  PT Equipment Recommendations  Equipment Needed: No  Other: pt owns RW, rollator, w/c, and SPC  Toilet - Technique: Ambulating  Equipment Used: Grab bars  Assessment        SLP          Body mass index is 26.98 kg/m². Assessment and Plan:    Impairments: decreased left knee ROM, decreased balance, baseline BLE sensory deficit     Left knee   -s/p I&D and repair (7/31 with Dr. Lynda Bruce  -Wound care  -WBAT  -PT/OT     Acute on chronic anemia  -Blood loss + h/o Pernicious anemia  -Monitor Hgb, transfuse prn <7  -Check B12     MARILIA on CKD  -Avoid nephrotoxins, renally dose meds  -Monitor BMP Mon/Thurs     HTN  -triamterene-HCTZ     Asthma  -Symbicort, prn duoneb     GERD  -Pantroprazole, prn TUMS     Osteoporosis  -Calcium+Vitamin D supplement     H/o breast cancer  -Anastrazole     Depression/Anxiety  -Venlafaxine, prn clonazepam     Idiopathic Neuropathy  -Contributing to fall  -Check B12  -PT/OT      Bladder   -High risk retention   -Monitor PVRs, SC prn >300cc     Bowel   -High risk constipation   -senna+colace BID, PRN miralax, MoM, and bisacodyl supp.     Pain control  -Gabapentin, PRN acetaminophen     PPx  -DVT: lovenox  -GI: pantoprazole    Rehab Progress: Interdisciplinary team conference was held today with entire rehab treatment team including PT, OT, SLP, Dietician, RN, and SW. Discussion focused on progress toward rehab goals and discharge planning. Making progress. Working on balance, compensatory strategies, fall prevention, safety awareness. Separate conference then held with patient/family, questions answered and concerns addressed. Total treatment time >35 min with greater than 50% spent in care coordination. Anticipated Dispo: home with   Services: HH PT, RN  DME: has rolling walker  ELOS: 8/8      Thomas Raymond MD 8/5/2020, 2:55 PM

## 2020-08-05 NOTE — PLAN OF CARE
Problem: Falls - Risk of:  Goal: Will remain free from falls  Description: Will remain free from falls  Outcome: Ongoing  Patient calls for assistance appropriately     Problem: Falls - Risk of:  Goal: Absence of physical injury  Description: Absence of physical injury  Outcome: Ongoing     Problem: PAIN  Goal: Patient's pain/discomfort is manageable  Outcome: Ongoing  Denies pain at the moment     Problem: SKIN INTEGRITY  Goal: Skin integrity is maintained or improved  8/5/2020 0320 by Bashir Castaneda RN  Outcome: Ongoing     Problem: Skin Integrity:  Goal: Will show no infection signs and symptoms  Description: Will show no infection signs and symptoms  Outcome: Ongoing  Dressing to the L knee is dry and intact.

## 2020-08-05 NOTE — DISCHARGE INSTR - COC
Continuity of Care Form    Patient Name: Meredith Sun   :  1935  MRN:  [id number]    Admit date:  8/3/2020  Discharge date:  2020    Code Status Order: Full Code   Advance Directives:   [facility] Documentation     Date/Time Healthcare Directive Type of Healthcare Directive Copy in [facility] [address] Agent's Name Healthcare Agent's Phone Number    20 [phone]  No, patient does not have an advance directive for healthcare treatment -- -- -- -- --          Admitting Physician:  Isabel Malone MD  PCP: Alpa Alvarez MD    Discharging Nurse: [facility] Unit/Room#: V8K-2478/8340-68  Discharging Unit Phone Number: [phone]    Emergency Contact:   Extended Emergency Contact Information  Primary Emergency Contact: Olamide Fernandez  Address: [address] [place] [address] [address] Phone: [phone]  Relation: Spouse  Secondary Emergency Contact: TIA Zendejas [address] Phone: [phone]  Work Phone: [phone]  Relation: Child    Past Surgical History:  Past Surgical History:   Procedure Laterality Date     SECTION       SECTION      x`s    KNEE SURGERY Left 2020    INCISION AND DRAINAGE LEFT KNEE performed by Gregory Encarnacion MD at [place] [address]       Immunization History:   Immunization History   Administered Date(s) Administered    Tdap (Boostrix, Adacel) 2015, 2020       Active Problems:  Patient Active Problem List   Diagnosis Code    Malignant neoplasm of upper-outer quadrant of left breast in female, estrogen receptor positive ([facility] [address] C50.412, Z17.0    Status post partial mastectomy of left breast Z90.12    Knee laceration, left, initial encounter S81.012A    Laceration of knee with complication, initial encounter S81.019A       Isolation/Infection:   Isolation          No Isolation        Patient Infection Status Infection Onset Added Last Indicated Last Indicated By Review Planned Expiration Resolved Resolved By    None active    Resolved    COVID-19 Rule Out 07/31/20 07/31/20 07/31/20 COVID-19 (Ordered)   07/31/20 Rule-Out Test Resulted          Nurse Assessment:  Last Vital Signs: BP (!) 143/74   Pulse 76   Temp 97.4 °F (36.3 °C) (Oral)   Resp 16   Ht 5' 4\" (1.626 m)   Wt 157 lb 3 oz (71.3 kg)   SpO2 93%   BMI 26.98 kg/m²     Last documented pain score (0-10 scale): Pain Level: 0  Last Weight:   Wt Readings from Last 1 Encounters:   08/05/20 157 lb 3 oz (71.3 kg)     Mental Status:  oriented, alert, coherent, logical, thought processes intact and able to concentrate and follow conversation    IV Access:  - None    Nursing Mobility/ADLs:  Walking   Assisted  Transfer  Assisted  Bathing  Independent  Dressing  Assisted  Toileting  Independent  Feeding  Independent  Med Admin  Assisted  Med Delivery   none    Wound Care Documentation and Therapy:        Elimination:  Continence:   · Bowel: Yes  · Bladder: Yes  Urinary Catheter: None   Colostomy/Ileostomy/Ileal Conduit: No       Date of Last BM: 08/08/20    Intake/Output Summary (Last 24 hours) at 8/5/2020 1343  Last data filed at 8/5/2020 1242  Gross per 24 hour   Intake 840 ml   Output --   Net 840 ml     I/O last 3 completed shifts: In: 840 [P.O.:840]  Out: -     Safety Concerns:     History of Falls (last 30 days)    Impairments/Disabilities:      None    Nutrition Therapy:  Current Nutrition Therapy:       Routes of Feeding: Oral  Liquids: Thin Liquids  Daily Fluid Restriction: no  Last Modified Barium Swallow with Video (Video Swallowing Test): not done    Treatments at the Time of Hospital Discharge:   Respiratory Treatments:   Oxygen Therapy:  is not on home oxygen therapy.   Ventilator:    - No ventilator support    Rehab Therapies: Physical Therapy  Weight Bearing Status/Restrictions: No weight bearing restirctions  Other Medical Equipment (for information only, NOT a DME order):  walker  Other Treatments:     Patient's personal belongings (please select all that are sent with patient):  Glasses    RN SIGNATURE:  Electronically signed by Garrett Santana RN on 8/8/20 at 2:38 PM EDT    CASE MANAGEMENT/SOCIAL [address] Status Date:8-2-2020    Readmission Risk Assessment Score:  Readmission Risk              Risk of Unplanned Readmission:        15           Discharging to Facility/ Agency   · Name:      [facility]   · Address:  · Phone:      [phone]  · Fax:          [phone]      / signature: Ashley York     Case Management   [phone]    8/5/2020  1:43 PM      PHYSICIAN SECTION    Prognosis: Good    Condition at Discharge: Stable    Rehab Potential (if transferring to Rehab): Good    Recommended Labs or Other Treatments After Discharge:   Home care PT and RN  F/u with PCP and Ortho    Physician Certification: I certify the above information and transfer of Janet Ortiz  is necessary for the continuing treatment of the diagnosis listed and that she requires Home Care for less 30 days.      Update Admission H&P: No change in H&P    PHYSICIAN SIGNATURE:  Electronically signed by Duglas Pedroza MD on 8/7/20 at 1:09 PM EDT

## 2020-08-05 NOTE — PROGRESS NOTES
Patient is alert and oriented x4, communicates appropriately and make her needs known. No new events over night, pain is under control at rest. Patient ambulates with the walker and a gait belt with contact guard assistance, she is low and gait can be unsteady. She calls for assistance appropriately, call light is within reach, bed is alarm on. Night time meds complete, patient tolerated them well. VSS and no s/s of distress, no further needs noted at this time.

## 2020-08-05 NOTE — CARE COORDINATION
SOCIAL WORK DISCHARGE SUMMARY:      DISCHARGE DATE:                 Saturday, 8-8-2020      DISCHARGE PLACE:                HOME                HOME CARE AGENCY:            [facility]             PHONE NUMBER          [phone]             FAX NUMBER                 [phone]      TRANSPORTATION:                  SPOUSE             TIME:                                  AFTER LATE DAY OF THERAPY      PREFERRED PHARMACY:                  NUMBER:                            MD ORDERS:   SN/PT    [facility]     Case Management   [phone]    8/5/2020  1:45 PM

## 2020-08-05 NOTE — CARE COORDINATION
Team Conference held today. Team reviewed progress and goals. Team reports she has right hand tremors that are mild. Team recommends DC to home on Saturday after a late day of therapy. Team recommends Home care orders for SN/PT/. No DME is recommended. LSW met with patient and spouse to introduce  role, initiate discussion regarding DC planning and to inform of weekly Team conferences that was today. SOCIAL WORK ASSESSMENT      GOAL:   To return home with spouse      HOME SITUATION:   Patient and spouse live in a house with 7 steps entry and then 14 steps up to bedroom and full bathroom. Prior to admit, pt was independent with personal care, ambulation and household chores except for shopping. She is active with Dr Mio Shaw for pcp needs. She and spouse are both retired. PRIOR LEVEL OF FUNCTIONING:       PERSONAL CARE:    independent                                                                       DRIVES:   yes                                                                     FINANCES:   independnt                                                                   MEALS:    indep                               GROCERY SHOPS:    family      DME CURRENTLY AT HOME:   Shower chair, 4 wh walker, wh walker, cane, wheelchair. CURRENT HOME CARE/SERVICES:  None currently. LSW informed them of recommendation for home care services post discharge for RN/PT/OT services. LSW provided them with CMS star rated list of agencies for their review. They both decided to use [facility] for home care as spouse had used before. Education given regarding CMS ratings. The Plan for Transition of Care is related to the following treatment goals: to continue her progress in personal care and ambulation needs in home setting. The Patient and/or patient representative spouse  was provided with a choice of provider and agrees   with the discharge plan.  [x] Yes [] No    Freedom of choice list was provided with basic dialogue that supports the patient's individualized plan of care/goals, treatment preferences and shares the quality data associated with the providers. [] Yes [] No        PREFERRED HOME CARE:  [facility] 4 DAY:   Wednesdays. MICHAEL informed her of weekly Conferences to review her progress, DME recommendations and DC date. I reviewed for them the conference update of today and they are in agreement with plan. [facility] informed patient of preferred  time on date of discharge which is between 10 - 12 noon. [facility] informed patient of recommendation for PCP visit within 7 days post discharge.     [place], [place]     Case Management   [phone]    8/5/2020  1:42 PM

## 2020-08-05 NOTE — PROGRESS NOTES
Occupational Therapy  Facility/Department: [address] IP REHAB  Daily Treatment Note  NAME: Kofi Early  : 1935  MRN: [id number]    Date of Service: 2020    Discharge Recommendations:  5-7 sessions per week, Home with assist PRN, Home with Home health OT  OT Equipment Recommendations  Other: has all needed DME    Assessment   Performance deficits / Impairments: Decreased functional mobility ; Decreased balance;Decreased ADL status; Decreased endurance;Decreased strength;Decreased safe awareness;Decreased high-level IADLs;Decreased coordination  Assessment: pt making steady gains w/ OT intervention. Today she was close SBA to use RW thru gym & bedroom areas, was instructed how to use reacher to grab items off floor & place into walker basket. She was able to stand up to 3 minutes w/ SBA, no LOB, cues to keep feet apart and for safe hand placement during sit<>stand transitions. Pt has all needed DME at home,  has a reacher she can use. Pt completed bed moblity IND'ly to reg queen bed. Low pain level reported, good flexion of L knee. Encouraged her to use ice prn for pain relieve & reducing edema. Cont w/ POC  Treatment Diagnosis: decreased ADL/IADL, balance, activity tolerance  Prognosis: Good  History: Patient is an 83 yo F with pmh Asthma, CKD, Depression, GERD, Neuropathy, Osteoporosis, Scoliosis, and Pernicious anemia who initially presented 2020 with left knee pain s/p fall. Patient reports losing her balance while reaching for garage door and falling onto knee. No head trauma or LOC. Found to have deep left knee laceration. Underwent I&D and laceration repair ( with Dr. Edilberto Davis). Now WBAT. Course complicated by MARILIA and mild anemia. Currently, patient reports mild left knee pain. Worse with activity and weight bearing. Improves with rest and medication. She has baseline numbness/tingling in bilateral distal lower extremities but no new tingling/numbness or focal weakness.  She would like to come to ARU to improve her function prior to returning home. (copied per note Dr Omari Lugo 8/3/2020)  Exam: transfers, fxl mob, LB dressing  Assistance / Modification: close SBA w/ t/f & fxl mob using RW, CGA w/ LB dressing using A/E  OT Education: ADL Adaptive Strategies;Transfer Training;Equipment  Patient Education: instructed how to use LH shoe horn, elastic laces, reacher & basket  REQUIRES OT FOLLOW UP: Yes  Activity Tolerance  Activity Tolerance: Patient Tolerated treatment well  Activity Tolerance: pt pleasant & cooperative  Safety Devices  Safety Devices in place: Yes  Type of devices: Gait belt         Patient Diagnosis(es): There were no encounter diagnoses. has a past medical history of Asthma, Cancer ([facility] [address] CKD (chronic kidney disease), Depression, GERD (gastroesophageal reflux disease), Neuropathy, Osteoporosis, Pernicious anemia, and Scoliosis. has a past surgical history that includes  section;  section; and knee surgery (Left, 2020). Restrictions  Restrictions/Precautions  Restrictions/Precautions: Fall Risk, Weight Bearing  Lower Extremity Weight Bearing Restrictions  Left Lower Extremity Weight Bearing: Weight Bearing As Tolerated  Subjective   General  Chart Reviewed: Yes, Orders, Progress Notes, History and Physical, Previous Admission  Patient assessed for rehabilitation services?: Yes  Additional Pertinent Hx: Patient is an 83 yo F with pmh Asthma, CKD, Depression, GERD, Neuropathy, Osteoporosis, Scoliosis, and Pernicious anemia who initially presented 2020 with left knee pain s/p fall. Patient reports losing her balance while reaching for garage door and falling onto knee. No head trauma or LOC. Found to have deep left knee laceration. Underwent I&D and laceration repair ( with Dr. Fátima Hudson). Now WBAT. Course complicated by MARILIA and mild anemia. Currently, patient reports mild left knee pain. Worse with activity and weight bearing.  Improves with rest and medication. She has baseline numbness/tingling in bilateral distal lower extremities but no new tingling/numbness or focal weakness. She would like to come to ARU to improve her function prior to returning home. (copied per note Dr Mala Raymond 8/3/2020)  Family / Caregiver Present: No  Referring Practitioner: Justine/Miriam  Diagnosis: L knee laceration - surgically repaired 7/31/2020  Subjective  Subjective: met in therapy dept, she is reporting 2/10 L knee pain  General Comment  Comments: agreeable for UE strengthening, household t/f      Orientation  Orientation  Overall Orientation Status: Within Functional Limits  Objective    ADL  LE Dressing: Contact guard assistance  Additional Comments: OT placed elastic laces in shoes since she becomes fatigued after bending over to tie them; she was shown how to use LH shoe horn to fix collapsed heels        Balance  Sitting Balance: Independent(able to bend over to tie shoes)  Standing Balance: Stand by assistance  Standing Balance  Time: ~2-3 minutes  Activity: using RW  Comment: able to stand w/ close SBA while retrieving items off floor using reacher + placing into basket  Functional Mobility  Functional - Mobility Device: Rolling Walker  Activity: Retrieve items;Transport items  Assist Level: Stand by assistance  Functional Mobility Comments: close SBA using RW for household distances in therapy, thru kitchen & bedroom area, no LOB cues for safe hand placement  Wheelchair Bed Transfers  Wheelchair/Bed - Technique: Ambulating  Equipment Used: Bed;Wheelchair  Level of Asssistance: Stand by assistance  Wheelchair Transfers Comments: using RW for t/f in & out of w/c & bed, given cues for safe hand placement  Bed mobility  Rolling to Right: Independent  Supine to Sit: Independent  Sit to Supine: Independent  Scooting: Independent  Comment: reg queen bed, no rails  Transfers  Sit to stand: Stand by assistance  Stand to sit: Stand by assistance  Transfer Comments: close SBA using RW for household t/f                    Vision  Patient Visual Report: No visual complaint reported. Vision Comment: wears reading glasses  Cognition  Overall Cognitive Status: WFL                    Type of ROM/Therapeutic Exercise  Type of ROM/Therapeutic Exercise: Free weights  Comment: using 2 lb wts for the following UE strengthening exercises to increase IND w/ sit<>stand transitions  Exercises  Shoulder Elevation: x 15 shld shrugs  Shoulder Flexion: x 10  Horizontal ABduction: x 10  Horizontal ADduction: x 10  Elbow Flexion: x 10  Elbow Extension: x 10  Supination: x 10  Pronation: x 10  Wrist Flexion: x 20 no wts  Wrist Extension: x 20 no wts  Finger Extension: x 20 no wts  Grasp/Release: 2 sets of 20 using 3 lb gripper  Other: x 10 abdominal squeezes to strengthen core for IND w/ bed mobility                    Plan   Plan  Times per week: 5-6 days per week  Times per day: Twice a day  Plan weeks: 1 week or less  Specific instructions for Next Treatment: car t/f  Current Treatment Recommendations: Strengthening, Endurance Training, Balance Training, Functional Mobility Training, Safety Education & Training, Equipment Evaluation, Education, & procurement, Patient/Caregiver Education & Training, Self-Care / ADL, Home Management Training  Plan Comment: Wed conf    Goals  Short term goals  Time Frame for Short term goals: 1 week pt will.   Short term goal 1: bathe indep with AD  Short term goal 2: dress indep with AD as needed  Short term goal 3: toilet indep with AD as needed  Short term goal 4: transfer indep with AD  Short term goal 5: functional mobility and simple meal prep indep with AD  Short term goal 6: improve activity tolerance to stand 6 min for IADL task  Long term goals  Time Frame for Long term goals : same as stg  Patient Goals   Patient goals : \"I want to be able to walk better,more confidence with walking\"       Therapy Time   Individual Concurrent Group Co-treatment   Time In 1115 Time Out 1200         Minutes 45         Timed Code Treatment Minutes: [address] [place], [place] [address]

## 2020-08-05 NOTE — PROGRESS NOTES
Patient admitted to rehab with I & D left knee. A/A/O x 4. Transfers with walker x1, SBA. On general select diet, tolerating well. Medications taken whole in water. On Lovenox for DVT prophylaxis. Skin has scattered bruising, tear upper lip, and incision left knee w/ 7 day dressing and ACE wrap in place. On room air. Has been continent of bowel and bladder. LBM 7/31. Senokot given per order. Chair/bed alarms in use and call light in reach. Will monitor for safety.

## 2020-08-05 NOTE — PROGRESS NOTES
Occupational Therapy  Facility/Department: [address] IP REHAB  Daily Treatment Note  NAME: Sanford Ness  : 1935  MRN: [id number]    Date of Service: 2020    Discharge Recommendations:  Home with assist PRN  OT Equipment Recommendations  Equipment Needed: Yes  Mobility Devices: ADL Assistive Devices  ADL Assistive Devices: Toilet Safety Frame(2 sets)  Other: toilet safety frames ordred from [facility] to be delivered to room prior to discharge    Assessment   Performance deficits / Impairments: Decreased functional mobility ; Decreased balance;Decreased ADL status; Decreased endurance;Decreased strength;Decreased safe awareness;Decreased high-level IADLs;Decreased coordination  Assessment: Pt cont to improve with transfers and functional mobility using rolling walker. Pt wants to purchase 2 sets of toilet safety frames, SW notified and ordered from [facility] per pt request.  Needs cont education for safety with stepping in/out of shower stall. Discharge planned for Sat after therapies. Will most likely not require home OT  Treatment Diagnosis: decreased ADL/IADL, balance, activity tolerance  Prognosis: Good  History: Patient is an 83 yo F with pmh Asthma, CKD, Depression, GERD, Neuropathy, Osteoporosis, Scoliosis, and Pernicious anemia who initially presented 2020 with left knee pain s/p fall. Patient reports losing her balance while reaching for garage door and falling onto knee. No head trauma or LOC. Found to have deep left knee laceration. Underwent I&D and laceration repair ( with Dr. Mala Torres). Now WBAT. Course complicated by MARILIA and mild anemia. Currently, patient reports mild left knee pain. Worse with activity and weight bearing. Improves with rest and medication. She has baseline numbness/tingling in bilateral distal lower extremities but no new tingling/numbness or focal weakness. She would like to come to ARU to improve her function prior to returning home. (copied per tingling/numbness or focal weakness. She would like to come to ARU to improve her function prior to returning home. (copied per note Dr Juan Andrews 8/3/2020)  Family / Caregiver Present: No  Referring Practitioner: Justine/Miriam  Diagnosis: L knee laceration - surgically repaired 7/31/2020  Subjective  Subjective: pt met in dept, agreeable to OT             Objective    ADL  Additional Comments: pt stated shoes felt too loose. Tightened elastic laces to pt comfort. She was then able to doff/don shoes with effort, but per self        Balance  Sitting Balance: Independent  Standing Balance: Stand by assistance  Functional Mobility  Functional - Mobility Device: Rolling Walker  Assist Level: Stand by assistance  Functional Mobility Comments: good walker safety in PM session  Toilet Transfers  Toilet - Technique: Ambulating  Toilet Transfer: Contact guard assistance  Toilet Transfers Comments: pt stated she wants to use her bathroom with lower seat due to the set up with vanity and tub, but when completed this set up in OT, it was a strain for her. Discussed toilet safety frames, she decided she and her  would benefit from the toilet safety frames. She would like 2 sets  - one for each bathroom. Discussed with SW to order from [employer] Transfers Comments: dry transfer to step over simulated shower stall with shower chair. Pt uses grab bar at home, but used wall to simulate. Able to step over and back to simulated shower stall but stepped with outside leg first, crossing legs, but stated this is the way she does it at home. Stepped out with inside leg per OT request which was safer.   May need to try in room with grab bar for closer simulation  Wheelchair Bed Transfers  Wheelchair/Bed - Technique: Ambulating  Equipment Used: Wheelchair  Level of Asssistance: Stand by assistance                            Cognition  Overall Cognitive Status: WFL                    Type of ROM/Therapeutic Exercise  Type of ROM/Therapeutic Exercise: Resistive Bands  Comment: completed resistance band ex - red - 10 reps each shoulder and elbow to improve activity tolerance for ADL and mobility. Provided pt with written HEP and band                    Plan   Plan  Times per week: 5-6 days per week  Times per day: Twice a day  Plan weeks: 1 week or less  Specific instructions for Next Treatment: car t/f  Current Treatment Recommendations: Strengthening, Endurance Training, Balance Training, Functional Mobility Training, Safety Education & Training, Equipment Evaluation, Education, & procurement, Patient/Caregiver Education & Training, Self-Care / ADL, Home Management Training  Plan Comment: Wed conf               Goals  Short term goals  Time Frame for Short term goals: 1 week pt will.   Short term goal 1: bathe indep with AD  Short term goal 2: dress indep with AD as needed  Short term goal 3: toilet indep with AD as needed  Short term goal 4: transfer indep with AD  Short term goal 5: functional mobility and simple meal prep indep with AD  Short term goal 6: improve activity tolerance to stand 6 min for IADL task  Long term goals  Time Frame for Long term goals : same as stg  Patient Goals   Patient goals : \"I want to be able to walk better,more confidence with walking\"       Therapy Time   Individual Concurrent Group Co-treatment   Time In 1430         Time Out 1515         Minutes 45         Timed Code Treatment Minutes: RALF HallmanR/L 770

## 2020-08-06 LAB
ANION GAP SERPL CALCULATED.3IONS-SCNC: 10 MMOL/L (ref 3–16)
BASOPHILS ABSOLUTE: 0.1 K/UL (ref 0–0.2)
BASOPHILS RELATIVE PERCENT: 1.3 %
BUN BLDV-MCNC: 42 MG/DL (ref 7–20)
CALCIUM SERPL-MCNC: 11.1 MG/DL (ref 8.3–10.6)
CHLORIDE BLD-SCNC: 99 MMOL/L (ref 99–110)
CO2: 31 MMOL/L (ref 21–32)
CREAT SERPL-MCNC: 1 MG/DL (ref 0.6–1.2)
EOSINOPHILS ABSOLUTE: 0.3 K/UL (ref 0–0.6)
EOSINOPHILS RELATIVE PERCENT: 5.5 %
FOLATE: 9.97 NG/ML (ref 4.78–24.2)
GFR AFRICAN AMERICAN: >60
GFR NON-AFRICAN AMERICAN: 53
GLUCOSE BLD-MCNC: 94 MG/DL (ref 70–99)
HCT VFR BLD CALC: 36.4 % (ref 36–48)
HEMOGLOBIN: 12.2 G/DL (ref 12–16)
LYMPHOCYTES ABSOLUTE: 1.3 K/UL (ref 1–5.1)
LYMPHOCYTES RELATIVE PERCENT: 23.9 %
MCH RBC QN AUTO: 31.5 PG (ref 26–34)
MCHC RBC AUTO-ENTMCNC: 33.4 G/DL (ref 31–36)
MCV RBC AUTO: 94.3 FL (ref 80–100)
MONOCYTES ABSOLUTE: 0.6 K/UL (ref 0–1.3)
MONOCYTES RELATIVE PERCENT: 11.5 %
NEUTROPHILS ABSOLUTE: 3.2 K/UL (ref 1.7–7.7)
NEUTROPHILS RELATIVE PERCENT: 57.8 %
PDW BLD-RTO: 13.7 % (ref 12.4–15.4)
PLATELET # BLD: 241 K/UL (ref 135–450)
PMV BLD AUTO: 8.4 FL (ref 5–10.5)
POTASSIUM REFLEX MAGNESIUM: 4.2 MMOL/L (ref 3.5–5.1)
RBC # BLD: 3.86 M/UL (ref 4–5.2)
SODIUM BLD-SCNC: 140 MMOL/L (ref 136–145)
VITAMIN B-12: 467 PG/ML (ref 211–911)
WBC # BLD: 5.6 K/UL (ref 4–11)

## 2020-08-06 PROCEDURE — 97110 THERAPEUTIC EXERCISES: CPT

## 2020-08-06 PROCEDURE — 85025 COMPLETE CBC W/AUTO DIFF WBC: CPT

## 2020-08-06 PROCEDURE — 6360000002 HC RX W HCPCS: Performed by: PHYSICAL MEDICINE & REHABILITATION

## 2020-08-06 PROCEDURE — 97110 THERAPEUTIC EXERCISES: CPT | Performed by: PHYSICAL THERAPIST

## 2020-08-06 PROCEDURE — 80048 BASIC METABOLIC PNL TOTAL CA: CPT

## 2020-08-06 PROCEDURE — 97530 THERAPEUTIC ACTIVITIES: CPT

## 2020-08-06 PROCEDURE — 6370000000 HC RX 637 (ALT 250 FOR IP): Performed by: PHYSICAL MEDICINE & REHABILITATION

## 2020-08-06 PROCEDURE — 97530 THERAPEUTIC ACTIVITIES: CPT | Performed by: PHYSICAL THERAPIST

## 2020-08-06 PROCEDURE — 1280000000 HC REHAB R&B

## 2020-08-06 PROCEDURE — 82746 ASSAY OF FOLIC ACID SERUM: CPT

## 2020-08-06 PROCEDURE — 82607 VITAMIN B-12: CPT

## 2020-08-06 PROCEDURE — 97116 GAIT TRAINING THERAPY: CPT

## 2020-08-06 PROCEDURE — 94761 N-INVAS EAR/PLS OXIMETRY MLT: CPT

## 2020-08-06 PROCEDURE — 97535 SELF CARE MNGMENT TRAINING: CPT

## 2020-08-06 PROCEDURE — 94640 AIRWAY INHALATION TREATMENT: CPT

## 2020-08-06 PROCEDURE — 94760 N-INVAS EAR/PLS OXIMETRY 1: CPT

## 2020-08-06 PROCEDURE — 97116 GAIT TRAINING THERAPY: CPT | Performed by: PHYSICAL THERAPIST

## 2020-08-06 PROCEDURE — 36415 COLL VENOUS BLD VENIPUNCTURE: CPT

## 2020-08-06 RX ADMIN — TRIAMTERENE AND HYDROCHLOROTHIAZIDE 2 TABLET: 37.5; 25 TABLET ORAL at 08:24

## 2020-08-06 RX ADMIN — ANASTROZOLE 1 MG: 1 TABLET ORAL at 08:24

## 2020-08-06 RX ADMIN — BUDESONIDE AND FORMOTEROL FUMARATE DIHYDRATE 2 PUFF: 160; 4.5 AEROSOL RESPIRATORY (INHALATION) at 08:39

## 2020-08-06 RX ADMIN — BUDESONIDE AND FORMOTEROL FUMARATE DIHYDRATE 2 PUFF: 160; 4.5 AEROSOL RESPIRATORY (INHALATION) at 20:56

## 2020-08-06 RX ADMIN — DOCUSATE SODIUM 50 MG AND SENNOSIDES 8.6 MG 1 TABLET: 8.6; 5 TABLET, FILM COATED ORAL at 21:58

## 2020-08-06 RX ADMIN — CALCIUM CARBONATE-CHOLECALCIFEROL TAB 250 MG-125 UNIT 250 MG: 250-125 TAB at 08:24

## 2020-08-06 RX ADMIN — DOCUSATE SODIUM 50 MG AND SENNOSIDES 8.6 MG 1 TABLET: 8.6; 5 TABLET, FILM COATED ORAL at 08:24

## 2020-08-06 RX ADMIN — ACETAMINOPHEN 650 MG: 325 TABLET ORAL at 21:59

## 2020-08-06 RX ADMIN — Medication 250 MG: at 08:24

## 2020-08-06 RX ADMIN — CALCIUM CARBONATE-CHOLECALCIFEROL TAB 250 MG-125 UNIT 250 MG: 250-125 TAB at 17:04

## 2020-08-06 RX ADMIN — PANTOPRAZOLE SODIUM 40 MG: 40 TABLET, DELAYED RELEASE ORAL at 06:21

## 2020-08-06 RX ADMIN — ENOXAPARIN SODIUM 40 MG: 40 INJECTION SUBCUTANEOUS at 08:24

## 2020-08-06 RX ADMIN — GABAPENTIN 200 MG: 100 CAPSULE ORAL at 21:58

## 2020-08-06 RX ADMIN — VENLAFAXINE HYDROCHLORIDE 150 MG: 75 CAPSULE, EXTENDED RELEASE ORAL at 08:24

## 2020-08-06 RX ADMIN — ACETAMINOPHEN 650 MG: 325 TABLET ORAL at 08:24

## 2020-08-06 ASSESSMENT — PAIN DESCRIPTION - FREQUENCY
FREQUENCY: CONTINUOUS
FREQUENCY: CONTINUOUS

## 2020-08-06 ASSESSMENT — PAIN DESCRIPTION - PROGRESSION
CLINICAL_PROGRESSION: NOT CHANGED
CLINICAL_PROGRESSION: NOT CHANGED

## 2020-08-06 ASSESSMENT — PAIN DESCRIPTION - LOCATION
LOCATION: GENERALIZED
LOCATION: GENERALIZED

## 2020-08-06 ASSESSMENT — PAIN DESCRIPTION - ONSET
ONSET: ON-GOING
ONSET: ON-GOING

## 2020-08-06 ASSESSMENT — PAIN - FUNCTIONAL ASSESSMENT: PAIN_FUNCTIONAL_ASSESSMENT: ACTIVITIES ARE NOT PREVENTED

## 2020-08-06 ASSESSMENT — PAIN SCALES - GENERAL
PAINLEVEL_OUTOF10: 0
PAINLEVEL_OUTOF10: 3
PAINLEVEL_OUTOF10: 2
PAINLEVEL_OUTOF10: 2

## 2020-08-06 ASSESSMENT — PAIN DESCRIPTION - PAIN TYPE
TYPE: ACUTE PAIN
TYPE: ACUTE PAIN

## 2020-08-06 ASSESSMENT — PAIN DESCRIPTION - ORIENTATION
ORIENTATION: OTHER (COMMENT)
ORIENTATION: RIGHT;LEFT

## 2020-08-06 ASSESSMENT — PAIN DESCRIPTION - DESCRIPTORS
DESCRIPTORS: ACHING
DESCRIPTORS: ACHING;DISCOMFORT

## 2020-08-06 NOTE — PROGRESS NOTES
Patient is alert and oriented x4, communicates appropriately and make her needs known. No new events over night, pain is under control at rest. Patient has not had a BM since 7/31, has a hx of constipation. Offered a suppository and she declined, prn Miralax given along with prune juice, no results yet. Patient ambulates with the walker and a gait belt with contact guard assistance, she is low and gait can be unsteady. She calls for assistance appropriately, call light is within reach, bed is alarm on. Night time meds complete, patient tolerated them well. VSS and no s/s of distress, no further needs noted at this time.

## 2020-08-06 NOTE — PROGRESS NOTES
Department of Physical Medicine & Rehabilitation  Progress Note    Patient Identification:  Genie Carbajal  [id number]  : 1935  Admit date: 8/3/2020    Chief Complaint: Knee laceration, left, initial encounter    Subjective:   No acute events overnight. Patient seen this afternoon sitting up in room. She reports fatigue from therapies but is motivated to continue as she is finding it helpful. Labs reviewed. ROS: No f/c, n/v, cp     Objective:  Patient Vitals for the past 24 hrs:   BP Temp Temp src Pulse Resp SpO2 Weight   20 0839 -- -- -- -- -- 97 % --   20 0434 (!) 144/73 97.4 °F (36.3 °C) Oral 67 16 94 % 156 lb 12 oz (71.1 kg)   20 2146 134/76 98 °F (36.7 °C) Oral 66 16 94 % --   20 2110 -- -- -- -- 16 93 % --   20 1538 137/73 98.4 °F (36.9 °C) Oral 73 16 92 % --     Const: Alert. No distress, pleasant. HEENT: Normocephalic, atraumatic. Normal sclera/conjunctiva. MMM. CV: Regular rate and rhythm. Resp: No respiratory distress. Lungs CTAB. Abd: Soft, nontender, nondistended, NABS+   Ext: No edema. MSK: Left knee ROM decreased, dressed with ace wrap, c/d/i  Neuro: Alert, oriented, appropriately interactive. Psych: Cooperative, appropriate mood and affect    Laboratory data: Available via EMR.    Last 24 hour lab  Recent Results (from the past 24 hour(s))   Basic Metabolic Panel w/ Reflex to MG    Collection Time: 20  7:35 AM   Result Value Ref Range    Sodium 140 136 - 145 mmol/L    Potassium reflex Magnesium 4.2 3.5 - 5.1 mmol/L    Chloride 99 99 - 110 mmol/L    CO2 31 21 - 32 mmol/L    Anion Gap 10 3 - 16    Glucose 94 70 - 99 mg/dL    BUN 42 (H) 7 - 20 mg/dL    CREATININE 1.0 0.6 - 1.2 mg/dL    GFR Non- 53 (A) >60    GFR African American >60 >60    Calcium 11.1 (H) 8.3 - 10.6 mg/dL   CBC Auto Differential    Collection Time: 20  7:35 AM   Result Value Ref Range    WBC 5.6 4.0 - 11.0 K/uL    RBC 3.86 (L) 4.00 - 5.20 M/uL    Hemoglobin 12.2 12.0 - 16.0 g/dL    Hematocrit 36.4 36.0 - 48.0 %    MCV 94.3 80.0 - 100.0 fL    MCH 31.5 26.0 - 34.0 pg    MCHC 33.4 31.0 - 36.0 g/dL    RDW 13.7 12.4 - 15.4 %    Platelets 950 265 - 843 K/uL    MPV 8.4 5.0 - 10.5 fL    Neutrophils % 57.8 %    Lymphocytes % 23.9 %    Monocytes % 11.5 %    Eosinophils % 5.5 %    Basophils % 1.3 %    Neutrophils Absolute 3.2 1.7 - 7.7 K/uL    Lymphocytes Absolute 1.3 1.0 - 5.1 K/uL    Monocytes Absolute 0.6 0.0 - 1.3 K/uL    Eosinophils Absolute 0.3 0.0 - 0.6 K/uL    Basophils Absolute 0.1 0.0 - 0.2 K/uL   Vitamin B12 & Folate    Collection Time: 08/06/20  7:35 AM   Result Value Ref Range    Vitamin B-12 467 211 - 911 pg/mL    Folate 9.97 4.78 - 24.20 ng/mL       Therapy progress:  PT     Objective     Sit to Stand: Supervision  Stand to sit: Supervision(continues to requires cues for turning with walker lining up properly for safe transfer)  Bed to Chair: Stand by assistance(short distance in room from bed to recliner)  Device: [address] [place] [address]: Supervision  Distance: 230' X 2 trials with multiple turns, short distances in therapy gym (w/c<-> steps/NuStep), w/c to recliner at completion of session  OT  PT Equipment Recommendations  Equipment Needed: No  Other: pt owns RW, rollator, w/c, and SPC  Toilet - Technique: Ambulating  Equipment Used: Standard bedside commode  Toilet Transfers Comments: has BSC over toilet, needed cues to lower herself & locate handles for safe t/f  Assessment        SLP          Body mass index is 26.91 kg/m².     Assessment and Plan:    Impairments: decreased left knee ROM, decreased balance, baseline BLE sensory deficit     Left knee   -s/p I&D and repair (7/31 with Dr. Medina Tavera  -Wound care  -WBAT  -PT/OT     Acute on chronic anemia  -Blood loss + h/o Pernicious anemia  -Monitor Hgb, transfuse prn <7  -B12 wnl     MARILIA on CKD  -Avoid nephrotoxins, renally dose meds  -Monitor BMP Mon/Thurs     HTN  -triamterene-HCTZ     Asthma  -Symbicort, prn

## 2020-08-06 NOTE — PLAN OF CARE
Problem: Falls - Risk of:  Goal: Will remain free from falls  Description: Will remain free from falls  8/5/2020 0958 by Laurie Crook RN  Outcome: Ongoing  Note: Fall risk band on patient. Orange light on outside of room. Non skid footwear in place. Alarms used appropriately. Patient instructed to call and wait for staff before getting up. Rounding done to anticipate needs. Appropriate safety devices used for transfers. Problem: PAIN  Goal: Patient's pain/discomfort is manageable  8/5/2020 0958 by Laurie Crook RN  Outcome: Ongoing  Note: Patient able to express pain and rate pain using pain scale. Medicate as needed per orders. Reassess patient pain level within one hour after oral pain medication/intervention to assure patient has reduced pain sensation and document outcome. Non pharmaceutical interventions as appropriate. Problem: SKIN INTEGRITY  Goal: Skin integrity is maintained or improved  8/5/2020 0958 by Laurie Crook RN  Outcome: Ongoing  Note: Skin assessment completed on admission and every shift. Barrier wipes used in the event of incontinence. Pressure relief techniques used as needed while in chair and in bed. Position changes encouraged at least every two hours while in bed. Problem: KNOWLEDGE DEFICIT  Goal: Patient/S.O. demonstrates understanding of disease process, treatment plan, medications, and discharge instructions. 8/5/2020 0958 by Laurie Crook RN  Outcome: Ongoing  Note: Educate patient about medications, teaching-disease process, and safety awareness. Problem: Skin Integrity:  Goal: Absence of new skin breakdown  Description: Absence of new skin breakdown  8/5/2020 0958 by Laurie Crook RN  Outcome: Ongoing  Note: Skin assessment completed on admission and every shift. Barrier wipes used in the event of incontinence. Pressure relief techniques used as needed while in chair and in bed. Position changes encouraged at least every two hours while in bed.

## 2020-08-06 NOTE — PROGRESS NOTES
come to ARU to improve her function prior to returning home. (copied per note Dr Modi Bending 8/3/2020)  Exam: transfers, fxl mob, LB dressing  Assistance / Modification: close S/SBA w/ t/f & fxl mob using RW, SBA w/ LB dressing using A/E, MI w/ toileting & shower level bathing using shower chair + GBs  OT Education: ADL Adaptive Strategies; Equipment  Patient Education: instructed how to use LH sponge to clean her feet instead of bending over, to reduce fall risk  Barriers to Learning: can be anxious, a little impulsive  REQUIRES OT FOLLOW UP: Yes  Activity Tolerance  Activity Tolerance: pt pleasant & cooperative, able to follow commands  Safety Devices  Safety Devices in place: Yes  Type of devices: Call light within reach; Chair alarm in place; Left in chair;Gait belt       PM session: met in room, she was sitting in recliner,  present briefly; she was given instruction how to use her new cell phone. Pt became emotional when seeing grandchildren on her photos. Pt needed close SBA for sit<stand out of recliner, had mild LOB but recovered & tried to get up a second time. Used RW into bathrm, given cues to not back up so far away from toilet, used BSC over toilet; she completed toilet tasks w/ MI. She ambulated over to sink to wash hands w/ MI. She completed t/f into w/c w/ SBA, fumbles w/ safe hand placement and forgets to bring RW w/ her thru entire transfer. Pt able to complete car t/f w/ SBA--cues for safe hand placement as she tried to hold onto car door to enter & exit car; able to lift her LEs in/out of car w/o difficulty. pt required close Supervision to stand at washing machine, loaded clothing inside by holding 1 hand on top of washer She dropped 2 socks but able to bend over to retrieve them by holding onto washer, given cues to operate buttons on machine. Pt ended session using 2 lb wts for UE HEP for strengthening, completed 1 set of 10-15 for each of the 5 strengthening exercises.  Also completed 2 sets meds; pt denies pain  General Comment  Comments: agreeable for toileting & shower      Orientation  Orientation  Overall Orientation Status: Within Functional Limits  Objective    ADL  Feeding: Independent  Grooming: Stand by assistance(cues to initiate brushing teeth; stood to complete, no LOB)  UE Bathing: Modified independent (while seated on shower chair, she managed water on/off; able to wash rinse & dry UB IND'ly after set up)  LE Bathing: Modified independent (while seated on shower chair able to wash rinse & dry LB, encouraged use of LH sponge to wash her feet to reduce fall risk from bending over; stood w/ MI using GB to perform nkechi cleaning)  UE Dressing: Modified independent (able to gather clothes from closet, donned bra & shirt while in standing, safety concerns)  LE Dressing: Stand by assistance(able to gather clothes, transport to bathrm; needed set up & cues to manage shoes & pull out tongue; stood w/ close S during management of clothing over hips)  Toileting: Modified independent (able to manage clothing & wiping; used BSC over tiolet)  Additional Comments: pt making good progress w/ ADLs, given cues to utilize some A/E to avoid bending over too much/reducing fall risk        Balance  Sitting Balance: Independent  Standing Balance: Stand by assistance  Standing Balance  Time: @ 3-4 minutes  Activity: using RW  Comment: during fxl mob to get items from closet, during LB ADLs  Functional Mobility  Functional - Mobility Device: Rolling Walker  Activity: Retrieve items;Transport items; To/from bathroom  Assist Level: Stand by assistance  Functional Mobility Comments: close SBA to ambulate from recliner to closet, gathered clothes & transported them to bathrm, completed toilet t/f using RW, given cues to stand closer to objects to avoid reaching outside MAICO/reducing fall risk  Toilet Transfers  Toilet - Technique: Ambulating  Equipment Used: Standard bedside commode  Toilet Transfer: Stand by assistance  Toilet Transfers Comments: has BSC over toilet, needed cues to lower herself & locate handles for safe t/f  Shower Transfers  Shower - Transfer From: Lillian Mac - Transfer Type: To  Shower - Transfer To: Shower seat with back  Shower - Technique: Ambulating  Shower Transfers: Stand by assistance  Shower Transfers Comments: close SBA + cues to safely enter/exit shower stall using GB & on/off shower chair, no LOB  Wheelchair Bed Transfers  Wheelchair/Bed - Technique: Ambulating  Equipment Used: Wheelchair; Other  Level of Asssistance: Stand by assistance  Wheelchair Transfers Comments: using RW for t/f in & out of w/c & recliner, given cues for safe hand placement     Transfers  Sit to stand: Stand by assistance  Stand to sit: Stand by assistance  Transfer Comments: close SBA using RW for household t/f                    Vision  Patient Visual Report: No visual complaint reported. Vision Comment: wears reading glasses  Cognition  Overall Cognitive Status: WFL  Cognition Comment: can be anxious                                         Plan   Plan  Times per week: 5-6 days per week  Times per day: Twice a day  Plan weeks: DC Sat after therapy sessions  Specific instructions for Next Treatment: car t/f  Current Treatment Recommendations: Strengthening, Endurance Training, Balance Training, Functional Mobility Training, Safety Education & Training, Equipment Evaluation, Education, & procurement, Patient/Caregiver Education & Training, Self-Care / ADL, Home Management Training  Plan Comment: Wed conf       Goals  Short term goals  Time Frame for Short term goals: 1 week pt will.   Short term goal 1: bathe indep with AD--goal met using shower chair, GB & LH sponge  Short term goal 2: dress indep with AD as needed  Short term goal 3: toilet indep with AD as needed--goal met using GB  Short term goal 4: transfer indep with AD  Short term goal 5: functional mobility and simple meal prep indep with AD  Short term

## 2020-08-06 NOTE — CARE COORDINATION
[employer] rep received referral from  for (2) Toilet Safety Frames. Will follow up with patient and spouse to deliver the TSF's prior to d/c on Saturday 8/8/20.     Thank you for the referral.  Electronically signed by Daniel Pittman on 8/6/2020 at 9:47 AM Cell ph# [phone]

## 2020-08-06 NOTE — PROGRESS NOTES
Physical Therapy  Facility/Department: [address] IP REHAB  Daily Treatment Note  NAME: Yennifer Dooley  : 1935  MRN: [id number]    Date of Service: 2020    Discharge Recommendations:  Home with assist PRN, S Level 1, Home with Home health PT(homs  after full day of therapy)   PT Equipment Recommendations  Equipment Needed: No  Other: pt owns RW, rollator, w/c, and SPC    Assessment   Body structures, Functions, Activity limitations: Decreased functional mobility ; Decreased balance;Decreased strength;Decreased endurance;Decreased ROM  Assessment: Pt with increased fatigue this date but participates well with therapy at this time, continues to ambulate with RW community distances with Supervision increased time. Pt perform transfers supervision with slight cues for safety awareness. Progressing well towards goals for safe discharge towards home saturday after therapy. Patient benefit from IP rehab to promote increased safety awareness with all functional mobility tasks, increased strength, decreased assistance for mobility tasks, improve balance to decrease risk for falls, and promote safe return to home with assistance PRN. Treatment Diagnosis: Difficulty with higher level functional tasks due to balance impairments  Prognosis: Good  Clinical Presentation: evolving  PT Education: Transfer Training; Adaptive Device Training;Equipment;General Safety;Gait Training;Functional Mobility Training  Patient Education: use of RW at all times and for discharge at this time  Barriers to [address] UP: Yes  Activity Tolerance  Activity Tolerance: Patient Tolerated treatment well;Patient limited by endurance  Activity Tolerance: Pt denies pain throughout treatment session but reports mild fatigue     Patient Diagnosis(es): There were no encounter diagnoses.      has a past medical history of Asthma, Cancer ([facility] [address] CKD (chronic kidney disease), Depression, GERD (gastroesophageal reflux disease), Neuropathy, Osteoporosis, Pernicious anemia, and Scoliosis. has a past surgical history that includes  section;  section; and knee surgery (Left, 2020).   Social/Functional History  Lives With: Spouse( Magdy- good health without device)  Type of Home: House  Home Layout: Multi-level(3 floors 7 steps to bedroom and bathroom, tri level)  Home Access: Stairs to enter with rails  Entrance Stairs - Number of Steps: 7 from living room to bed room level with bilateral railing, 14 total steps to get to bedroom and bathroom, garge to enter level entry into living without bathroom  Entrance Stairs - Rails: Both  Bathroom Shower/Tub: Walk-in shower, Shower chair with back, Curtain  Bathroom Toilet: Standard(one toliet is taller in the othe bathroom)  Bathroom Equipment: Shower chair, Grab bars in shower(no prior toilet equipment)  Bathroom Accessibility: Walker accessible  Home Equipment: 4 wheeled walker, Rolling walker, Blanka Lujan, [employer], [employer]  Receives Help From: Family  ADL Assistance: Independent  Homemaking Assistance: Independent(some cooking and cleaning, shares responsibility with )  Homemaking Responsibilities: Yes(Does need assist wit grocery shopping)  Ambulation Assistance: Independent(states she uses 4 wh walker when outside, furniture walker inside the home stating RW does no fit)  Transfer Assistance: Independent(standard bed)  Active : Yes  Mode of Transportation: Car  Occupation: Retired  Type of occupation: [employer] [address]: takes care of the house, enjoys seeing grandchildren (4), but hasnt seen them recently due to the pandemic  IADL Comments:  and patient share the homemaking  Additional Comments: Reports having decreased balance for about a month, states she has noticed her handwriting getting worse, but does not discussed it with the to improve posture overall but with slight improve ability to identify posture this morning  Gait Deviations: Slow Sindi;Decreased step length;Decreased step height  Distance: 230' X 2 trials with multiple turns, short distances in therapy gym (w/c<-> steps/NuStep), w/c to recliner at completion of session  Comments: pt tolerates well with only mild fatigue and mild LR  Stairs/Curb  Stairs?: Yes  Stairs  # Steps : 12  Stairs Height: 6\"  Rails: Right ascending  Curbs: 6\"(X 1 trial with RW supervision with cues for safety with participation)  Device: No Device  Assistance: Stand by assistance  Comment: primary SBA with slight weakness with BLEs causing [place] gait with slight decreased clearance of BLEs with cues for safety, reaches to left to catch balance X 1 with cues for safety recalls recommendations with nonreciprocal pattern with increased time to perform. Balance  Posture: Good  Sitting - Static: Good  Sitting - Dynamic: Good  Standing - Static: Good  Standing - Dynamic: Good  Comments: use of RW for stability throughout  Other exercises  Other exercises 1: NuStep 11 minutes, seat 9, BUE 9, level 3 resistance, average SPM: 22 .Pt denies pulling on incision and feels well throughout without complaints. Goals  Short term goals  Time Frame for Short term goals: 5-7 days  Short term goal 1: bed mobility mod I  Short term goal 2: transfer sit <-> stand mod I  Short term goal 3: ambulate with LRAD 150' mod I  Short term goal 4: ascend/descend 12 steps with bilateral railing mod I  Short term goal 5: ascend/decend curb step with LRAD mod I  Long term goals  Time Frame for Long term goals : STG=LTG  Patient Goals   Patient goals : \"walk right again, to be strong to be able to work in yard again to plant a flower. Be able to go to store.  Be strong again so I don't hurt\"    Plan    Plan  Times per week: 5-6X  Times per day: Twice a day  Plan weeks: 1  Current Treatment Recommendations: Strengthening, Endurance Training, Balance Training, Stair training, Gait Training, Home Exercise Program, Functional Mobility Training, Transfer Training, Neuromuscular Re-education, Safety Education & Training, Equipment Evaluation, Education, & procurement  Safety Devices  Type of devices:  All fall risk precautions in place, Call light within reach, Gait belt, Patient at risk for falls, Left in chair, Chair alarm in place(assisted with positioning in recliner at completion of session)  Restraints  Initially in place: No     Therapy Time   Individual Concurrent Group Co-treatment   Time In 0900         Time Out 0945         Minutes 45         Timed Code Treatment Minutes: Sameer Trinh, PT     Electronically signed by Inocente Hernandez PT on 8/6/20 at 9:48 AM EDT

## 2020-08-07 PROCEDURE — 97110 THERAPEUTIC EXERCISES: CPT | Performed by: PHYSICAL THERAPIST

## 2020-08-07 PROCEDURE — 1280000000 HC REHAB R&B

## 2020-08-07 PROCEDURE — 97110 THERAPEUTIC EXERCISES: CPT

## 2020-08-07 PROCEDURE — 97530 THERAPEUTIC ACTIVITIES: CPT | Performed by: PHYSICAL THERAPIST

## 2020-08-07 PROCEDURE — 94640 AIRWAY INHALATION TREATMENT: CPT

## 2020-08-07 PROCEDURE — 97535 SELF CARE MNGMENT TRAINING: CPT

## 2020-08-07 PROCEDURE — 97116 GAIT TRAINING THERAPY: CPT | Performed by: PHYSICAL THERAPIST

## 2020-08-07 PROCEDURE — 6360000002 HC RX W HCPCS: Performed by: PHYSICAL MEDICINE & REHABILITATION

## 2020-08-07 PROCEDURE — 97530 THERAPEUTIC ACTIVITIES: CPT

## 2020-08-07 PROCEDURE — 6370000000 HC RX 637 (ALT 250 FOR IP): Performed by: PHYSICAL MEDICINE & REHABILITATION

## 2020-08-07 RX ORDER — DIPHENHYDRAMINE HCL 25 MG
25 TABLET ORAL EVERY 6 HOURS PRN
Status: DISCONTINUED | OUTPATIENT
Start: 2020-08-07 | End: 2020-08-08 | Stop reason: HOSPADM

## 2020-08-07 RX ADMIN — Medication 250 MG: at 08:22

## 2020-08-07 RX ADMIN — ENOXAPARIN SODIUM 40 MG: 40 INJECTION SUBCUTANEOUS at 08:23

## 2020-08-07 RX ADMIN — VENLAFAXINE HYDROCHLORIDE 150 MG: 75 CAPSULE, EXTENDED RELEASE ORAL at 08:22

## 2020-08-07 RX ADMIN — DOCUSATE SODIUM 50 MG AND SENNOSIDES 8.6 MG 1 TABLET: 8.6; 5 TABLET, FILM COATED ORAL at 22:43

## 2020-08-07 RX ADMIN — CLONAZEPAM 0.5 MG: 0.5 TABLET ORAL at 12:10

## 2020-08-07 RX ADMIN — DIPHENHYDRAMINE HCL 25 MG: 25 TABLET ORAL at 15:54

## 2020-08-07 RX ADMIN — GABAPENTIN 200 MG: 100 CAPSULE ORAL at 22:43

## 2020-08-07 RX ADMIN — BUDESONIDE AND FORMOTEROL FUMARATE DIHYDRATE 2 PUFF: 160; 4.5 AEROSOL RESPIRATORY (INHALATION) at 20:03

## 2020-08-07 RX ADMIN — BISACODYL 5 MG: 5 TABLET, COATED ORAL at 15:54

## 2020-08-07 RX ADMIN — ANASTROZOLE 1 MG: 1 TABLET ORAL at 08:22

## 2020-08-07 RX ADMIN — CALCIUM CARBONATE-CHOLECALCIFEROL TAB 250 MG-125 UNIT 250 MG: 250-125 TAB at 17:21

## 2020-08-07 RX ADMIN — CLONAZEPAM 0.5 MG: 0.5 TABLET ORAL at 22:43

## 2020-08-07 RX ADMIN — DOCUSATE SODIUM 50 MG AND SENNOSIDES 8.6 MG 1 TABLET: 8.6; 5 TABLET, FILM COATED ORAL at 08:22

## 2020-08-07 RX ADMIN — PANTOPRAZOLE SODIUM 40 MG: 40 TABLET, DELAYED RELEASE ORAL at 07:00

## 2020-08-07 RX ADMIN — TRIAMTERENE AND HYDROCHLOROTHIAZIDE 2 TABLET: 37.5; 25 TABLET ORAL at 08:22

## 2020-08-07 RX ADMIN — CALCIUM CARBONATE-CHOLECALCIFEROL TAB 250 MG-125 UNIT 250 MG: 250-125 TAB at 08:22

## 2020-08-07 ASSESSMENT — PAIN SCALES - GENERAL
PAINLEVEL_OUTOF10: 0

## 2020-08-07 NOTE — PROGRESS NOTES
Patient admitted to rehab with I&D of left knee. A&Ox4. Transfers with walker x1 assist. On general diet, tolerating well. Medications taken whole with thin liquids. On Lovenox for DVT prophylaxis. Pt has scattered bruising, tear on upper lip, and incision on left knee. On room air. Has been continent of bowel and bladder. LBM 7/31/2020, pt refused suppository and MOM. Pt denies pain/discomfort. Chair/bed alarms in use and call light in reach. Will continue to monitor.

## 2020-08-07 NOTE — CARE COORDINATION
NAVYAW met with patient to review DC for tomorrow after a late day of therapy. She reports she has ordered two TSF and now wants to add a walker basket. Call placed to [facility] to inform. She verbalized understanding it is $20.  NAVYAW informed her  will need to be after 3pm.  She confirmed she does want home care with [facility]. The Plan for Transition of Care is related to the following treatment goals:  tp continue his progress in personal care and ambulation needs in home setting. The Patient and s[pise was provided with a choice of provider and agrees   with the discharge plan. [x] Yes [] No    Freedom of choice list was provided with basic dialogue that supports the patient's individualized plan of care/goals, treatment preferences and shares the quality data associated with the providers. [x] Yes [] No  IMM letter presented.   [place], [place]     Case Management   [phone]    8/7/2020  11:04 AM

## 2020-08-07 NOTE — CARE COORDINATION
[employer] rep delivered the requested TSFx2 and the [employer] to patient and spouse and discussed equipment setup.     Thank you for the referral.  Electronically signed by Cami Jackson on 8/7/2020 at 12:41 PM Cell ph# [phone]

## 2020-08-07 NOTE — PROGRESS NOTES
section;  section; and knee surgery (Left, 2020). Restrictions  Restrictions/Precautions  Restrictions/Precautions: Fall Risk, Weight Bearing  Lower Extremity Weight Bearing Restrictions  Left Lower Extremity Weight Bearing: Weight Bearing As Tolerated  Position Activity Restriction  Other position/activity restrictions: Per Dr. Vickie Peacock consult note 2020: \"Ms. Bajwa 82 y.o.  female presents today to [facility] ED via EMS for evaluation of a left knee injury which occurred when she was at home and fell on her left knee with open large laceration. \"  Subjective   General  Chart Reviewed: Yes, Progress Notes  Patient assessed for rehabilitation services?: Yes  Additional Pertinent Hx: Patient is an 81 yo F with pmh Asthma, CKD, Depression, GERD, Neuropathy, Osteoporosis, Scoliosis, and Pernicious anemia who initially presented 2020 with left knee pain s/p fall. Patient reports losing her balance while reaching for garage door and falling onto knee. No head trauma or LOC. Found to have deep left knee laceration. Underwent I&D and laceration repair ( with Dr. Vickie Peacock). Now WBAT. Course complicated by MARILIA and mild anemia. Currently, patient reports mild left knee pain. Worse with activity and weight bearing. Improves with rest and medication. She has baseline numbness/tingling in bilateral distal lower extremities but no new tingling/numbness or focal weakness. She would like to come to ARU to improve her function prior to returning home. (copied per note Dr Mary Fish 8/3/2020)  Family / Caregiver Present: No  Referring Practitioner: Justine/Arebi  Diagnosis: L knee laceration - surgically repaired 2020  Subjective  Subjective: Pt seen in the department. Objective       Meal Prep  Meal Prep Level: Walker  Meal Prep Level of Assistance: Supervision  Meal Preparation: Pt prepared an egg on the stove.   She required cues for safety to move closer when reaching for items in the refrigerator and cabinet. She was able to appropriately choose the correct burner. After cooking the egg she turned off the burner and placed items in the . Balance  Sitting Balance: Independent  Standing Balance: Stand by assistance  Functional Mobility  Functional - Mobility Device: Rolling Walker  Activity: Other; To/From therapy gym(Ambulated in the rehab gym, and her room)  Assist Level: Modified independent   Wheelchair Bed Transfers  Wheelchair/Bed - Technique: Ambulating  Equipment Used: Wheelchair; Other(chair with arms, recliner)  Level of Asssistance: Modified independent      Transfers  Sit to stand: Modified independent  Stand to sit: Modified independent        Plan   Plan  Times per week: 5-6 days per week  Times per day: Twice a day  Plan weeks: DC Sat after therapy sessions  Specific instructions for Next Treatment: car t/f  Current Treatment Recommendations: Strengthening, Endurance Training, Balance Training, Functional Mobility Training, Safety Education & Training, Equipment Evaluation, Education, & procurement, Patient/Caregiver Education & Training, Self-Care / ADL, Home Management Training  Plan Comment: Wed conf      Goals  Short term goals  Time Frame for Short term goals: 1 week pt will.   Short term goal 1: bathe indep with AD--goal met using shower chair, GB & LH sponge  Short term goal 2: dress indep with AD as needed  Short term goal 3: toilet indep with AD as needed--goal met using GB  Short term goal 4: transfer indep with AD- goal met  Short term goal 5: functional mobility and simple meal prep indep with AD  Short term goal 6: improve activity tolerance to stand 6 min for IADL task  Long term goals  Time Frame for Long term goals : same as stg  Patient Goals   Patient goals : \"I want to be able to walk better,more confidence with walking\"       Therapy Time   Individual Concurrent Group Co-treatment   Time In 0830         Time Out 0920         Minutes 50 Hannah Benitez, [address]

## 2020-08-07 NOTE — PROGRESS NOTES
Resting well overnight. Pt admitted s/p left knee laceration and repair. Incisions covered with dry dressing and ace wrap. Transfers with a walker x1, tolerates well. Lungs clear but diminished at the bases. HR regular. Abdomen non tender with active bowel sounds. No BM since 7/31. Pt refuses PRN bowel medication. Education provided. C/o left knee pain and medicated per PRN orders. All needs assessed with rounding. Alarms active. Will continue to monitor.  Arnoldo Lomas RN

## 2020-08-07 NOTE — PLAN OF CARE
Problem: Falls - Risk of:  Goal: Will remain free from falls  Description: Will remain free from falls  Outcome: Ongoing  Note: Pt educated on falls precautions and safety. Call light and personal belongings within reach at all times. Non skid socks in use when up. Hourly rounding and alarms active. Problem: PAIN  Goal: Patient's pain/discomfort is manageable  Outcome: Ongoing  Note: Able to rate pain using a 1-10 scale, medicated per prn orders, see MAR. Able to verbalize a reduction in pain and/or able to fall asleep and remain asleep without any s/s of pain       Problem: Skin Integrity:  Goal: Absence of new skin breakdown  Description: Absence of new skin breakdown  Outcome: Ongoing  Note: Able to change positions in bed without assist, no evidence of skin breakdown noted. Waffle cushion in place to chair. Problem: Pain:  Goal: Pain level will decrease  Description: Pain level will decrease  Outcome: Ongoing  Note: Able to rate pain using a 1-10 scale, medicated per prn orders, see MAR.  Able to verbalize a reduction in pain and/or able to fall asleep and remain asleep without any s/s of pain

## 2020-08-07 NOTE — PROGRESS NOTES
Occupational Therapy  OCCUPATIONAL THERAPY  Progress Note   Second Session    Patient Name: Naida Brock  Medical Record Number: [id number]    Treatment Diagnosis: Difficulty with higher level functional tasks due to balance impairments    General  Chart Reviewed: Yes, Progress Notes  Patient assessed for rehabilitation services?: Yes  Additional Pertinent Hx: Patient is an 81 yo F with pmh Asthma, CKD, Depression, GERD, Neuropathy, Osteoporosis, Scoliosis, and Pernicious anemia who initially presented 7/31/2020 with left knee pain s/p fall. Patient reports losing her balance while reaching for garage door and falling onto knee. No head trauma or LOC. Found to have deep left knee laceration. Underwent I&D and laceration repair (7/31 with Dr. Keli Rubio). Now WBAT. Course complicated by MARILIA and mild anemia. Currently, patient reports mild left knee pain. Worse with activity and weight bearing. Improves with rest and medication. She has baseline numbness/tingling in bilateral distal lower extremities but no new tingling/numbness or focal weakness. She would like to come to ARU to improve her function prior to returning home. (copied per note Dr Bee Chew 8/3/2020)  Family / Caregiver Present: No  Referring Practitioner: Justine/Miriam  Diagnosis: L knee laceration - surgically repaired 7/31/2020     Restrictions/Precautions  Restrictions/Precautions: Fall Risk, Weight Bearing  Lower Extremity Weight Bearing Restrictions  Left Lower Extremity Weight Bearing: Weight Bearing As Tolerated     Position Activity Restriction  Other position/activity restrictions: Per Dr. Keli Rubio consult note 7-: \"Ms. Bajwa 82 y.o.  female presents today to [facility] ED via EMS for evaluation of a left knee injury which occurred when she was at home and fell on her left knee with open large laceration. \"    Subjective: Patient arrived to gym via w/c, denies pain.   Does not have any specific concerns with discharge to home tomorrow, Saturday, 8/8, after all therapies are completed. Objective: agreeable for car t/f, household transfers    Assessment:   She completed fxl mobility thru gym & bathrm areas  & completed toilet t/f using TSF w/ MI. She was able to approach car using RW w/ MI, entered & exited car seat w/ good recall of safe hand placement. She completed 1 set of 15 for shld shrugs, abdominal squeezes & 2 sets of 20 using 3 lb gripper. Pt completed bed mobility IND'ly to reg queen bed. She finished session using 2 lb wts for 1 set of 15 for strengthening--pt improved all sit<>stand transitions w/ MI. Educated on removal of throw rugs to reduce fall risk. She is ready for DC to home tomorrow after PM tx--will have shower in AM for final ADL session. Pt provided w/ 2 TSF per pt's request to use on each of her toilets at home.  Tx time: 45 min    Safety Device - Type of devices:  []  All fall risk precautions in place [] Bed alarm in place  [] Call light within reach [] Chair alarm in place [] Positioning belt [x] Gait belt [] Patient at risk for falls [] Left in bed [] Left in chair [] Telesitter in use [] Sitter present [] Nurse notified []  None      Therapy Time   Individual Co-treatment   Time In 1345     Time Out 1430     Minutes 45       Electronically signed by ODILIA Vazquez/L #[id number] on 8/7/2020 at 1:46 PM

## 2020-08-07 NOTE — PROGRESS NOTES
Physical Therapy  Facility/Department: [address] REHAB  Daily Treatment Note  - AM and PM Sessions    NAME: Cheryle Everett  : 1935  MRN: [id number]    Date of Service: 2020    Discharge Recommendations:  Home with assist PRN, S Level 1, Home with Home health PT(homs  after full day of therapy)   PT Equipment Recommendations  Equipment Needed: No  Other: pt owns RW, rollator, w/c, and SPC   Patient received walker basket and 2 sets of toilet safety frames for home use per [employer] - delivered to patient's room. Assessment   Body structures, Functions, Activity limitations: Decreased functional mobility ; Decreased balance;Decreased strength;Decreased endurance;Decreased ROM  Assessment: Pt continues with fatigue but participates well with therapy when given ample rest breaks - did use her inhaler at the beginning of the session. Patient continues to ambulate with wheeled community distances with Supervision and occasional cues for preferred technique with walker safety. Ambulated some community distances with rollator since patient has wheeled walker and rollator at home and since has multi-level home may be able to use each on a different levels to avoid need to carry on steps between levels. Pt perform transfers supervision with occasional cues for safety awareness. Patient was able to ascend/descend 12 steps with 1 rail, folded walker, and close supervision. Progressing well towards goals anticipating safe discharge to home Saturday, , after therapy. Patient would benefit from additional home PT to ensure safe transition to home and to continue to progress strengthening and balance activities, possibly advancing assistive device as appropriate. Recommend home PT assess for safety with use of wheeled walker/rollator on various levels of home and make recommendations.    all functional mobility tasks, increased strength, decreased assistance for mobility tasks, improve balance to decrease risk for falls, and promote safe return to home with assistance PRN. Treatment Diagnosis: Difficulty with higher level functional tasks due to balance impairments  Prognosis: Good  Clinical Presentation: evolving  PT Education: Transfer Training; Adaptive Device Training;Equipment;General Safety;Gait Training;Functional Mobility Training  Patient Education: use of RW at all times and for discharge at this time, how to fold wheeled walker and manage on her own up/down steps if spouse is either unable or unavailable to assist taking walker to different levels of patient's home  Barriers to Learning: [place]  REQUIRES PT FOLLOW UP: Yes  Activity Tolerance  Activity Tolerance: Patient Tolerated treatment well;Patient limited by endurance  Activity Tolerance: Pt denies pain throughout treatment session but reports mild fatigue     Patient Diagnosis(es): There were no encounter diagnoses. has a past medical history of Asthma, Cancer ([facility] [address] CKD (chronic kidney disease), Depression, GERD (gastroesophageal reflux disease), Neuropathy, Osteoporosis, Pernicious anemia, and Scoliosis. has a past surgical history that includes  section;  section; and knee surgery (Left, 2020). Restrictions  Restrictions/Precautions  Restrictions/Precautions: Fall Risk, Weight Bearing  Lower Extremity Weight Bearing Restrictions  Left Lower Extremity Weight Bearing: Weight Bearing As Tolerated  Position Activity Restriction  Other position/activity restrictions: Per Dr. Sharda Olson consult note 2020: \"Ms. Bajwa 82 y.o.  female presents today to [facility] ED via EMS for evaluation of a left knee injury which occurred when she was at home and fell on her left knee with open large laceration. \"  Subjective   General  Chart Reviewed:  Yes  Additional Pertinent Hx: \"81 yo F with pmh Asthma, CKD, Depression, GERD, Neuropathy, Osteoporosis, Scoliosis, and Pernicious anemia who initially presented 2020 with left knee pain s/p fall. Patient reports losing her balance while reaching for garage door and falling onto knee. No head trauma or LOC. Found to have deep left knee laceration. Underwent I&D and laceration repair (7/31 with Dr. Geraldine Eldridge). Now WBAT. Course complicated by MARILIA and mild anemia. Currently, patient reports mild left knee pain. Worse with activity and weight bearing. Improves with rest and medication. She has baseline numbness/tingling in bilateral distal lower extremities but no new tingling/numbness or focal weakness. She would like to come to ARU to improve her function prior to returning home. \" (Dr. Emmy Perkins 8/3/2020)  Response To Previous Treatment: Patient with no complaints from previous session. Family / Caregiver Present: No  Referring Practitioner: Dr. Estephanie Mart: Patient reports that her L knee \"just ached last night. \"  Reports 2/10 pain in L knee currently. Pt agreeable to PT treatment session at this time. Denies any specific concerns with discharge to home tomorrow, Saturday, 8/8, after all therapies are completed.           Orientation   [facility]  Cognition   Cognition  Overall Cognitive Status: [facility]  Cognition Comment: can be anxious, forgetful with preferred walker safety and hand placement with turning and backing up  Objective      Transfers  Sit to Stand: Supervision  Stand to sit: Supervision(continues to requires cues for turning with walker lining up properly for safe transfer)  Car Transfer: Supervision(cues for safe/preferred hand placement)  Ambulation  Ambulation?: Yes  WB Status: WBAT  Ambulation 1  Surface: level tile  Device: Rolling Walker  Assistance: Supervision  Quality of Gait: step to pattern w/ decreased step length and height. good control of RW, mulitple cues to improve posture overall but with slight improve ability to identify posture this morning  Gait Deviations: Slow Sindi;Decreased step length;Decreased step height  Distance: 200' X 2 trials with multiple turns, shorter distances in therapy gym with multiple turns to access curb and steps, w/c to recliner at completion of session  Comments: pt tolerates well with only mild fatigue and mild LR, stopped in her room as she was passing by to take her inhaler medication  Ambulation 2  Surface - 2: level tile  Device 2: Rollator  Assistance 2: Stand by assistance;Supervision  Quality of Gait 2: step to pattern w/ decreased step length and height;  limited ability to safely control of rollator and needs cues for brakes prior transfers, mulitple cues to improve posture overall but with slight improve ability to identify posture this morning  Distance: 180' x 2  Comments: suggested that patient may want to use wheeled walker on one level of her home and rollator on other level of her home to limit need to carry walker between levels - will defer further recommendations to home PT  Stairs/Curb  Stairs?: Yes  Stairs  # Steps : 12  Stairs Height: 6\"  Rails: Right ascending  Curbs: 6\"(X 1 trial with RW supervision with cues for safety with participation)  Device: Rolling walker(with curb and folded wheeled walker with steps)  Assistance: Stand by assistance  Comment: Curb step with wheeled walker and close SBA for balance. Steps with 1 rail (R rail ascending) and folded walker with close SBA, nonreciprocal gait pattern ascending with R LE first and descending with L LE first.                  Other Activities: Other (see comment)  Comment: Returned patient to room ambulating with wheeled walker and supervision. Patient requested to use the bathroom - toilet transfers with SBA/supervision, able to manage pants down and up and perform pericare on her own after urination. Stood to wash hands at sink with supervision for standing balance. Second Session  Patient denies pain, no specific concerns with discharge to home with spouse tomorrow. Sit to stand with supervision/modified independece. Ambulated 220' x 2 with wheeled walker and supervision/mod I, able to turn and back up with wheeled walker more safely. Ambulated figure 8 x 2 around tables on carpet area with supervision, occasional cues for attention to posture and positioning in walker. Ambulated 150' including mostly outdoor, uneven surfaces with wheeled walker and SBA with cues for safe walker management over roughest terrain and with turns as patient tends to step outside frame of walker and has difficulty with tight turns. Standing exercises with walker for UE support: heel/toe raises, mini squats, marching, hip abd, hip ext, and knee flex x 10 reps with CGA/close SBA throughout. Transported back to her room via [facility] with transporter. Goals  Short term goals  Time Frame for Short term goals: 5-7 days  Short term goal 1: bed mobility mod I  Short term goal 2: transfer sit <-> stand mod I  Short term goal 3: ambulate with LRAD 150' mod I  Short term goal 4: ascend/descend 12 steps with bilateral railing mod I  Short term goal 5: ascend/decend curb step with LRAD mod I  Long term goals  Time Frame for Long term goals : STG=LTG  Patient Goals   Patient goals : \"walk right again, to be strong to be able to work in yard again to plant a flower. Be able to go to store. Be strong again so I don't hurt\"    Plan    Plan  Times per week: 5-6X  Times per day: Twice a day  Plan weeks: 1  Current Treatment Recommendations: Strengthening, Endurance Training, Balance Training, Stair training, Gait Training, Home Exercise Program, Functional Mobility Training, Transfer Training, Neuromuscular Re-education, Safety Education & Training, Equipment Evaluation, Education, & procurement  Plan Comment: DC to home with spouse on Saturday, 8/8, and will follow up with home PT. Safety Devices  Type of devices:  All fall risk precautions in place, Call light within reach, Gait belt, Patient at risk for falls, Left in chair, Chair alarm in place(assisted with positioning in recliner at completion of session)  Restraints  Initially in place: No     Therapy Time   Individual Concurrent Group Co-treatment   Time In 0950         Time Out 1045         Minutes 55         Timed Code Treatment Minutes: 55 Minutes     Second Session Therapy Time   Individual Co-treatment   Time In [address]     Minutes [place], PT #[id number]

## 2020-08-07 NOTE — PLAN OF CARE
Problem: Falls - Risk of:  Goal: Will remain free from falls  Description: Will remain free from falls  Outcome: Ongoing  Note: Fall risk assessment completed. Fall precautions in place. Call light within reach. Pt educated on calling for assistance before getting up. Walkway free of clutter. Will continue to monitor. Problem: DAILY CARE  Goal: Daily care needs are met  Outcome: Ongoing  Note: Pt is A&O x4 and calls appropriately. Call light within reach at all times. RN/PCA doing hourly rounds. Needs are being met this shift. Will continue to monitor. Problem: SKIN INTEGRITY  Goal: Skin integrity is maintained or improved  Outcome: Ongoing  Note: Patient's skin was assessed. Pt currently has surgical incision on right knee with 7 day dressing in place; scattered bruising; and tear on upper lip. No new findings were noted. Patient is being educated on importance of turning/repostioning at least every two hours. RN will continue to educate and monitor.

## 2020-08-07 NOTE — CARE COORDINATION
Call placed to [facility], spoke to Abril Bishop who states she has what she needs for home care ordes.   Irasema [facility], [place]     Case Management   [phone]    8/7/2020  3:53 PM

## 2020-08-07 NOTE — PROGRESS NOTES
Department of Physical Medicine & Rehabilitation  Progress Note    Patient Identification:  Naida Brock  [id number]  : 1935  Admit date: 8/3/2020    Chief Complaint: Knee laceration, left, initial encounter    Subjective:   No acute events overnight. Patient seen this afternoon sitting up in gym. Reports feeling well and looking forward to PR home tomorrow. We discussed meds, f/u, and home care. Has not had BM in few days and refusing MoM and bisacodyl supp. Will try bisacodyl po. ROS: No f/c, n/v, cp     Objective:  Patient Vitals for the past 24 hrs:   BP Temp Temp src Pulse Resp SpO2 Weight   20 0815 (!) 150/77 97.9 °F (36.6 °C) Oral 77 16 93 % --   20 0432 (!) 154/75 98.1 °F (36.7 °C) Oral 73 14 95 % 156 lb 8.4 oz (71 kg)   20 2157 139/79 97.8 °F (36.6 °C) Oral 71 17 96 % --   206 -- -- -- -- 16 97 % --   20 1431 130/72 98 °F (36.7 °C) Oral 69 16 95 % --     Const: Alert. No distress, pleasant. HEENT: Normocephalic, atraumatic. Normal sclera/conjunctiva. MMM. CV: Regular rate and rhythm. Resp: No respiratory distress. Lungs CTAB. Abd: Soft, nontender, nondistended, NABS+   Ext: No edema. MSK: Left knee ROM decreased, dressed with ace wrap, c/d/i  Neuro: Alert, oriented, appropriately interactive. Psych: Cooperative, appropriate mood and affect    Laboratory data: Available via EMR. Last 24 hour lab  No results found for this or any previous visit (from the past 24 hour(s)). Therapy progress:  PT  Position Activity Restriction  Other position/activity restrictions: Per Dr. Keli Rubio consult note 2020: \"Ms. Bajwa 82 y.o.  female presents today to [facility] ED via EMS for evaluation of a left knee injury which occurred when she was at home and fell on her left knee with open large laceration. \"  Objective     Sit to Stand: Supervision  Stand to sit: Supervision(continues to requires cues for turning with walker lining up properly for safe transfer)  Bed to Chair: Stand by assistance(short distance in room from bed to recliner)  Device: [address]: Supervision  Distance: 200' X 2 trials with multiple turns, shorter distances in therapy gym with multiple turns to access curb and steps, w/c to recliner at completion of session  OT  PT Equipment Recommendations  Equipment Needed: No  Other: pt owns RW, rollator, w/c, and SPC  Toilet - Technique: Ambulating  Equipment Used: Standard bedside commode  Toilet Transfers Comments: has BSC over toilet, needed cues to lower herself & locate handles for safe t/f  Assessment        SLP          Body mass index is 26.87 kg/m². Assessment and Plan:    Impairments: decreased left knee ROM, decreased balance, baseline BLE sensory deficit     Left knee laceration  -s/p I&D and repair (7/31 with Dr. Amaury Benítez  -Incision healing well without evidence of infection  -WBAT  -PT/OT     Acute on chronic anemia  -Blood loss + h/o Pernicious anemia  -Hgb stable >11  -B12 wnl     MARILIA on CKD  -Avoid nephrotoxins, renally dose meds  -Cr now improved to 1.0     HTN  -triamterene-HCTZ     Asthma  -Symbicort, prn duoneb     GERD  -Pantroprazole, prn TUMS     Osteoporosis  -Calcium+Vitamin D supplement     H/o breast cancer  -Anastrazole     Depression/Anxiety  -Venlafaxine, prn clonazepam     Idiopathic Neuropathy  -Contributing to fall  -Per patient has had work-up by PCP.   -B12 this admission wnl  -PT/OT      Bladder   -High risk retention   -Monitor PVRs, SC prn >300cc     Bowel   -High risk constipation   -senna+colace BID, PRN miralax, MoM, and bisacodyl supp. -Bisacodyl po today     Pain control  -Gabapentin, PRN acetaminophen     PPx  -DVT: lovenox  -GI: pantoprazole    Rehab Progress: Making progress. Working on balance, compensatory strategies, fall prevention, safety awareness. Anticipated Dispo: home with   Services: HH PT, RN  DME: has rolling walker  ELOS: 8/8      Thomas Ornelas MD 8/7/2020, 1:05 PM

## 2020-08-08 VITALS
OXYGEN SATURATION: 95 % | TEMPERATURE: 97.7 F | SYSTOLIC BLOOD PRESSURE: 151 MMHG | RESPIRATION RATE: 16 BRPM | WEIGHT: 156.53 LBS | HEIGHT: 64 IN | BODY MASS INDEX: 26.72 KG/M2 | HEART RATE: 93 BPM | DIASTOLIC BLOOD PRESSURE: 78 MMHG

## 2020-08-08 PROCEDURE — 94760 N-INVAS EAR/PLS OXIMETRY 1: CPT

## 2020-08-08 PROCEDURE — 94640 AIRWAY INHALATION TREATMENT: CPT

## 2020-08-08 PROCEDURE — 6370000000 HC RX 637 (ALT 250 FOR IP): Performed by: PHYSICAL MEDICINE & REHABILITATION

## 2020-08-08 PROCEDURE — 97530 THERAPEUTIC ACTIVITIES: CPT | Performed by: PHYSICAL THERAPIST

## 2020-08-08 PROCEDURE — 97535 SELF CARE MNGMENT TRAINING: CPT

## 2020-08-08 PROCEDURE — 97116 GAIT TRAINING THERAPY: CPT | Performed by: PHYSICAL THERAPIST

## 2020-08-08 PROCEDURE — 97530 THERAPEUTIC ACTIVITIES: CPT

## 2020-08-08 PROCEDURE — 6360000002 HC RX W HCPCS: Performed by: PHYSICAL MEDICINE & REHABILITATION

## 2020-08-08 PROCEDURE — 97110 THERAPEUTIC EXERCISES: CPT | Performed by: PHYSICAL THERAPIST

## 2020-08-08 RX ORDER — SODIUM PHOSPHATE, DIBASIC AND SODIUM PHOSPHATE, MONOBASIC 7; 19 G/133ML; G/133ML
1 ENEMA RECTAL
Status: COMPLETED | OUTPATIENT
Start: 2020-08-08 | End: 2020-08-08

## 2020-08-08 RX ADMIN — SODIUM PHOSPHATE 1 ENEMA: 7; 19 ENEMA RECTAL at 12:32

## 2020-08-08 RX ADMIN — ANASTROZOLE 1 MG: 1 TABLET ORAL at 10:04

## 2020-08-08 RX ADMIN — DOCUSATE SODIUM 50 MG AND SENNOSIDES 8.6 MG 1 TABLET: 8.6; 5 TABLET, FILM COATED ORAL at 10:03

## 2020-08-08 RX ADMIN — PANTOPRAZOLE SODIUM 40 MG: 40 TABLET, DELAYED RELEASE ORAL at 06:38

## 2020-08-08 RX ADMIN — VENLAFAXINE HYDROCHLORIDE 150 MG: 75 CAPSULE, EXTENDED RELEASE ORAL at 10:03

## 2020-08-08 RX ADMIN — BUDESONIDE AND FORMOTEROL FUMARATE DIHYDRATE 2 PUFF: 160; 4.5 AEROSOL RESPIRATORY (INHALATION) at 07:45

## 2020-08-08 RX ADMIN — CALCIUM CARBONATE-CHOLECALCIFEROL TAB 250 MG-125 UNIT 250 MG: 250-125 TAB at 10:03

## 2020-08-08 RX ADMIN — Medication 250 MG: at 10:02

## 2020-08-08 RX ADMIN — BISACODYL 10 MG: 10 SUPPOSITORY RECTAL at 10:50

## 2020-08-08 RX ADMIN — ENOXAPARIN SODIUM 40 MG: 40 INJECTION SUBCUTANEOUS at 10:04

## 2020-08-08 RX ADMIN — TRIAMTERENE AND HYDROCHLOROTHIAZIDE 2 TABLET: 37.5; 25 TABLET ORAL at 10:02

## 2020-08-08 RX ADMIN — CLONAZEPAM 0.5 MG: 0.5 TABLET ORAL at 13:39

## 2020-08-08 ASSESSMENT — PAIN SCALES - GENERAL: PAINLEVEL_OUTOF10: 0

## 2020-08-08 NOTE — PROGRESS NOTES
Physical Therapy  Facility/Department: [address] REHAB  Daily Treatment Note and   Discharge Summary  NAME: Genie Carbajal  : 1935  MRN: [id number]    Date of Service: 2020    Discharge Recommendations:  Home with assist PRN, S Level 1, Home with Home health PT(homs  after full day of therapy)   PT Equipment Recommendations  Equipment Needed: No  Other: pt owns RW, rollator, w/c, and SPC    Assessment   Body structures, Functions, Activity limitations: Decreased functional mobility ; Decreased balance;Decreased strength;Decreased endurance;Decreased ROM  Assessment: Patient has met 5/5 goals and is safe to return home with her spouse assisting her as needed. Pt continues with fatigue but participates well with therapy when given ample rest breaks. Patient consistently ambulating with wheeled walker for community distances with modified independence, more consistently turning and backing up with wheeled walker. Patient practiced ambulating some community distances with rollator since patient has both wheeled walker and rollator at home and since has multi-level home may be able to use each on a different levels to avoid need to carry on steps between levels. Pt performed transfers with modified independence with occasional cues for improved safety awareness, but not necessarily unsafe or increased fall risk. Patient was able to ascend/descend 12 steps with 1 rail and modified independences. Patient would benefit from additional home PT to ensure safe transition to home and to continue to progress strengthening and balance activities, possibly advancing assistive device as appropriate. Recommend home PT assess for safety with use of wheeled walker/rollator on various levels of home and make recommendations.     HOME HEALTH CARE: LEVEL 1 STANDARD     -Initial home health evaluation to occur within 24-48 hours, in patient home    -Home health agency to establish plan of care for patient over 60 with open large laceration. \"  Subjective   General  Chart Reviewed: Yes  Additional Pertinent Hx: \"83 yo F with pmh Asthma, CKD, Depression, GERD, Neuropathy, Osteoporosis, Scoliosis, and Pernicious anemia who initially presented 7/31/2020 with left knee pain s/p fall. Patient reports losing her balance while reaching for garage door and falling onto knee. No head trauma or LOC. Found to have deep left knee laceration. Underwent I&D and laceration repair (7/31 with Dr. Deepak Galaviz). Now WBAT. Course complicated by MARILIA and mild anemia. Currently, patient reports mild left knee pain. Worse with activity and weight bearing. Improves with rest and medication. She has baseline numbness/tingling in bilateral distal lower extremities but no new tingling/numbness or focal weakness. She would like to come to ARU to improve her function prior to returning home. \" (Dr. Eleazar Baker 8/3/2020)  Response To Previous Treatment: Patient with no complaints from previous session. ;Patient reporting fatigue but able to participate. Referring Practitioner: Dr. Birdie Villegas: Patient denies pain in her L knee, but reports pain in her tailbone which she reports started about 4 months ago after hitting it on the edge of the step. Slept fairly well, looking forward to dsicharge to home later today. Denies any specific concerns with discharge to home.   General Comment  Comments: Sitting in recliner at beginning of session, just finished with breakfast.          Orientation  Orientation  Overall Orientation Status: Within Normal Limits(BIMS 15/15)  Cognition   Cognition  Overall Cognitive Status: WFL  Cognition Comment: can be anxious, occasionally forgetful with preferred walker safety and hand placement with turning and backing up  Objective   Bed mobility  Bridging: Independent  Rolling to Left: Independent  Rolling to Right: Independent  Supine to Sit: Independent  Sit to Supine: Independent  Scooting: Independent  Comment: flat hospital bed with no rails, 2 pillows to R while seated  Transfers  Sit to Stand: Modified independent  Stand to sit: Modified independent  Bed to Chair: Modified independent  Car Transfer: Modified independent  Comment: multiple surfaces including recliner, toilet, car and various chairs  Ambulation  Ambulation?: Yes  WB Status: WBAT  Ambulation 1  Surface: level tile  Device: Rolling Walker  Assistance: Modified Independent  Quality of Gait: step to pattern w/ decreased step length and height, improved control of RW, occasional cues to improve posture overall but with slight improve ability to identify need for posture modifications  Gait Deviations: Slow Sindi;Decreased step length;Decreased step height  Distance: 200' X 2 trials with multiple turns, shorter distances in therapy gym with multiple turns to access curb and steps, 125' outdoors on uneven surfaces, 100' back to her room, short distances in her room to access toilet, sink, bed and recliner for personal care needs  Comments: pt tolerates well with only mild fatigue and mild LR; suggested that patient may want to use wheeled walker on one level of her home and rollator on other level of her home to limit need to carry walker between levels - will defer further recommendations to home PT  Stairs/Curb  Stairs?: Yes  Stairs  # Steps : 12  Stairs Height: 6\"  Rails: Right ascending  Curbs: 6\"(X 1 trial with RW supervision with cues for safety with participation)  Device: Rolling walker(with curb and folded wheeled walker with steps)  Assistance: Modified independent   Comment: Curb step with wheeled walker and modified independence, recalled proper sequence and able to safely manage wheeled walker.   12 steps with 1 rail (R rail ascending)  and modified independent, recalled correct sequencing with nonreciprocal gait pattern ascending with R LE first and descending with L LE first.  Complained of shortness of breath and fatigue, requiring seated rest. After seated rest, ascend/descended 4 steps with 1 rail and folded walker with supervision/mod I - uncertain if she will use rollator on level of kitchen and wheeled walker on level with bed and bathroom, will carry wheeled walker between floors, or will have her spouse carry wheeled walker between floors. Balance  Posture: Good  Sitting - Static: Good  Sitting - Dynamic: Good  Standing - Static: Good  Standing - Dynamic: Good  Comments: use of RW for stability throughout, able to  object from floor with wheeled walker for UE support and reacher with mod I  Exercises  Comments: Issued and reviewed written and illustrated HEP for standing and seated exercises. Standing exercises with wheeled walker for UE support: heel/toe raises, mini squats, marching, hip abd, hip ext, and knee flex x 10 reps. Seated: knee ext, glut sets, hip flex/marching, resisted hip abd with red exercise band, ankle pumps, and hip add sets x 10 reps. Other Activities: Other (see comment)  Comment: Patient in recliner after breakfast, still in [facility]. Patient preferred to put clothes on to participate with therapy, so allowed patient to dress and assisted with set up due to time constraints. Will formally address bathing and dressing with OT later. Patient requested to use the bathroom and brush her hair prior to leaving her room. Able to safely manage wheeled walker in bathroom with mod I.   Toilet transfers and toileting with mod I - able to manage pants down/up and perform pericare with mod I. Stood at sink to wash hands and brush her hair with mod I.                Goals  Short term goals  Time Frame for Short term goals: 5-7 days  Short term goal 1: bed mobility mod I - met - 8/8/2020  Short term goal 2: transfer sit <-> stand mod I  - met - 8/8/2020  Short term goal 3: ambulate with LRAD 150' mod I  - met - 8/8/2020  Short term goal 4: ascend/descend 12 steps with bilateral railing mod I  - met - 8/8/2020, with 1 rail but not with managing to carry folded wheeled walker - recommend using rollator on one level and 2-wheeled walker on other OR having spouse carry walker to multiple levels  Short term goal 5: ascend/decend curb step with LRAD mod I  - met - 8/8/2020  Long term goals  Time Frame for Long term goals : STG=LTG  Patient Goals   Patient goals : \"walk right again, to be strong to be able to work in yard again to plant a flower. Be able to go to store. Be strong again so I don't hurt\"    Plan    follow up with home PT    Current Treatment Recommendations: Strengthening, Endurance Training, Balance Training, Stair training, Gait Training, Home Exercise Program, Functional Mobility Training, Transfer Training, Neuromuscular Re-education, Safety Education & Training, Equipment Evaluation, Education, & procurement  Plan Comment: DC to home with spouse on Saturday, 8/8, and will follow up with home PT. Safety Devices  Type of devices: All fall risk precautions in place, Call light within reach, Gait belt, Patient at risk for falls, Left in chair, Chair alarm in place(assisted with positioning in recliner at completion of session)   Patient asked to go to the bathroom for BM at end of session so observed patient safely get to bathroom and informed PCA, Alek Cox, that patient would be calling for him after she is finished.    Restraints  Initially in place: No     Therapy Time   Individual Concurrent Group Co-treatment   Time In 0805         Time Out 0935         Minutes 90         Timed Code Treatment Minutes: Scarlett, PT #[id number]

## 2020-08-08 NOTE — PROGRESS NOTES
Occupational Therapy  Facility/Department: [address] IP REHAB  Daily Treatment Note  NAME: Mikki Lockett  : 1935  MRN: [id number]    Date of Service: 2020    Discharge Recommendations:  Home with assist PRN  OT Equipment Recommendations  ADL Assistive Devices: Toilet Safety Frame  Other: 2 sets of toilet safety frames ordred from [facility] to be delivered to room prior to discharge    Assessment   Performance deficits / Impairments: Decreased functional mobility ; Decreased balance;Decreased ADL status; Decreased endurance;Decreased strength;Decreased safe awareness;Decreased high-level IADLs;Decreased coordination  Assessment: Pt seen in room, still has not had a BM despite suppository and enema so a lot of session was spent in bathroom trying to facilitate BM. Pt completed LB dressing mod I while in bathroom. Completed transfers and mobility mod I. Pt tolerated session well. D/ C home with assist of , two TSFs and walker basket for use at home. D/C OT.  OT Education: ADL Adaptive Strategies; Equipment  REQUIRES OT FOLLOW UP: Yes  Activity Tolerance  Activity Tolerance: Patient Tolerated treatment well  Safety Devices  Safety Devices in place: Yes  Type of devices: Call light within reach;Nurse notified; Left in chair         Patient Diagnosis(es): There were no encounter diagnoses. has a past medical history of Asthma, Cancer ([facility] [address] CKD (chronic kidney disease), Depression, GERD (gastroesophageal reflux disease), Neuropathy, Osteoporosis, Pernicious anemia, and Scoliosis. has a past surgical history that includes  section;  section; and knee surgery (Left, 2020).     Restrictions  Restrictions/Precautions  Restrictions/Precautions: Fall Risk, Weight Bearing  Lower Extremity Weight Bearing Restrictions  Left Lower Extremity Weight Bearing: Weight Bearing As Tolerated  Position Activity Restriction  Other position/activity restrictions: Per Dr. Dupree Meter consult note 7-: \"Ms. Bajwa 82 y.o.  female presents today to [facility] ED via EMS for evaluation of a left knee injury which occurred when she was at home and fell on her left knee with open large laceration. \"  Subjective   General  Chart Reviewed: Yes, Progress Notes  Patient assessed for rehabilitation services?: Yes  Additional Pertinent Hx: Patient is an 83 yo F with pmh Asthma, CKD, Depression, GERD, Neuropathy, Osteoporosis, Scoliosis, and Pernicious anemia who initially presented 7/31/2020 with left knee pain s/p fall. Patient reports losing her balance while reaching for garage door and falling onto knee. No head trauma or LOC. Found to have deep left knee laceration. Underwent I&D and laceration repair (7/31 with Dr. Dipak Kumar). Now WBAT. Course complicated by MARILIA and mild anemia. Currently, patient reports mild left knee pain. Worse with activity and weight bearing. Improves with rest and medication. She has baseline numbness/tingling in bilateral distal lower extremities but no new tingling/numbness or focal weakness. She would like to come to ARU to improve her function prior to returning home. (copied per note Dr Isabel Alcocer 8/3/2020)  Family / Caregiver Present: No  Referring Practitioner: Justine/Miriam  Diagnosis: L knee laceration - surgically repaired 7/31/2020  Subjective  Subjective: Seen in room; fixated on having BM after suppository and then enema.   No other complaints  General Comment  Comments: agreeable for toileting & shower           Objective    ADL  Feeding: Independent  Grooming: Independent  UE Bathing: Independent  LE Dressing: (dressed LB in shoes, depends and pants while seated on toilet.)  Toileting: Modified independent                                       Cognition  Overall Cognitive Status: [facility]  Cognition Comment: can be anxious, occasionally forgetful with preferred walker safety and hand placement with turning and backing up                                         Plan Plan  Times per week: 5-6 days per week  Times per day: Twice a day  Plan weeks: DC Sat after therapy sessions  Specific instructions for Next Treatment: car t/f  Current Treatment Recommendations: Strengthening, Endurance Training, Balance Training, Functional Mobility Training, Safety Education & Training, Equipment Evaluation, Education, & procurement, Patient/Caregiver Education & Training, Self-Care / ADL, Home Management Training  Plan Comment: Wed conf             Goals  Short term goals  Time Frame for Short term goals: 1 week pt will.   Short term goal 1: bathe indep with AD--goal met using shower chair, GB & LH sponge  Short term goal 2: dress indep with AD as needed- GOAL MET  Short term goal 3: toilet indep with AD as needed--goal met using GB  Short term goal 4: transfer indep with AD- goal met  Short term goal 5: functional mobility and simple meal prep indep with AD- GOAL MET  Short term goal 6: improve activity tolerance to stand 6 min for IADL task- GOAL MET  Long term goals  Time Frame for Long term goals : same as stg  Patient Goals   Patient goals : \"I want to be able to walk better,more confidence with walking\"       Therapy Time   Individual Concurrent Group Co-treatment   Time In 1200         Time Out 1330         Minutes 90         Timed Code Treatment Minutes: Martín Alcaraz, OT

## 2020-08-08 NOTE — PROGRESS NOTES
Occupational Therapy  Occupational Therapy  Discharge Summary     Gela Conley  NXR:[id number]  RRI:2/52/9331  Treatment Diagnosis: Difficulty with higher level functional tasks due to balance impairments  Diagnosis: L knee laceration repair 7/31/2020    Restrictions/Precautions  Restrictions/Precautions: Fall Risk, Weight Bearing   Lower Extremity Weight Bearing Restrictions  Left Lower Extremity Weight Bearing: Weight Bearing As Tolerated       Position Activity Restriction  Other position/activity restrictions: Per Dr. Temitope Andrews consult note 7-: \"Ms. Bajwa 82 y.o.  female presents today to [facility] ED via EMS for evaluation of a left knee injury which occurred when she was at home and fell on her left knee with open large laceration. \"     Goals:   Short term goals  Time Frame for Short term goals: 1 week pt will. Short term goal 1: bathe indep with AD--goal met using shower chair, GB & LH sponge  Short term goal 2: dress indep with AD as needed- GOAL MET  Short term goal 3: toilet indep with AD as needed--goal met using GB  Short term goal 4: transfer indep with AD- goal met  Short term goal 5: functional mobility and simple meal prep indep with AD- GOAL MET  Short term goal 6: improve activity tolerance to stand 6 min for IADL task- GOAL MET   Long term goals  Time Frame for Long term goals : same as stg    Pt. Met 6/6 short term goals.      Current Functional Status:   ADL  Feeding: Independent  Grooming: Stand by assistance(cues to initiate brushing teeth; stood to complete, no LOB)  UE Bathing: Independent  LE Bathing: Modified independent (while seated on shower chair able to wash rinse & dry LB, encouraged use of LH sponge to wash her feet to reduce fall risk from bending over; stood w/ MI using GB to perform nkechi cleaning)  UE Dressing: Modified independent (able to gather clothes from closet, donned bra & shirt while in standing, safety concerns)  LE Dressing: Independent  Toileting: Modified independent   Additional Comments: pt making good progress w/ ADLs, given cues to utilize some A/E to avoid bending over too much/reducing fall risk    Bed mobility  Bridging: Independent  Rolling to Left: Independent  Rolling to Right: Independent  Supine to Sit: Independent  Sit to Supine: Independent  Scooting: Independent  Comment: flat hospital bed with no rails, 2 pillows to R while seated    Functional Transfers: Toilet Transfers  Toilet - Technique: Ambulating  Equipment Used: Grab bars  Toilet Transfer: Modified independent  Toilet Transfers Comments: using TSF         Shower Transfers  Shower - Transfer From: Ector Danielle - Transfer Type: To  Shower - Transfer To: Shower seat with back  Shower - Technique: Ambulating  Shower Transfers: Stand by assistance  Shower Transfers Comments: close SBA + cues to safely enter/exit shower stall using GB & on/off shower chair, no LOB    Car Transfers  Car - Transfer From: Walker  Car - Technique: Ambulating  Car Transfers: Modified independence  Car Transfers: She was able to approach car using RW w/ MI, entered & exited car seat w/ good recall of safe hand placement      Functional Mobility  Functional - Mobility Device: Rolling Walker  Activity: Other, To/From therapy gym(Ambulated in the rehab gym, and her room)  Assist Level: Modified independent   Functional Mobility Comments: close SBA to ambulate from recliner to closet, gathered clothes & transported them to bathrm, completed toilet t/f using RW, given cues to stand closer to objects to avoid reaching outside [place]/reducing fall risk    Instrumental ADL's  Instrumental ADLs: Yes  Meal Prep  Meal Prep Level: Walker  Meal Prep Level of Assistance: Supervision  Meal Preparation: Pt prepared an egg on the stove. She required cues for safety to move closer when reaching for items in the refrigerator and cabinet. She was able to appropriately choose the correct burner.   After cooking the egg she turned off the burner and placed items in the . Light Housekeeping  Light Housekeeping Level: Other  Light Housekeeping Level of Assistance: Supervision  Light Housekeeping: pt required close Supervision to stand at washing machine, loaded clothing inside by holding 1 hand on top of washer She dropped 2 socks but able to bend over to retrieve them by holding onto washer, given cues to operate buttons on machine                          UE Function:     WFLs    Assessment:   Assessment: Pt was able to complete meal prep this date with cues for reaching for items. She demonstrated good safety with the stove. She completed transfers and mobility with modified independence.   Prognosis: Good  Barriers to Learning: can be anxious, a little impulsive  REQUIRES OT FOLLOW UP: Yes  Discharge Recommendations: Home with assist PRN    Equipment Recommendations:  2 TSF per pt's request, one for each toilet at home; walker basket    Plan Comment: has support from  at home    [facility]: LEVEL 1 STANDARD     -Initial home health evaluation to occur within 24-48 hours, in patient home    -Home health agency to establish plan of care for patient over 60 day period    -Medication Reconciliation    -PCP Visit scheduled within seven days of discharge    -PT/OT to evaluate with goal of regaining prior level of functioning    -OT to evaluate if patient has [address] needs for personal care       Home Exercise Program  Provided Pt with red theraband    Electronically signed by ODILIA Cleary/L #[id number] on 8/8/2020 at 12:34 PM

## 2020-08-08 NOTE — PROGRESS NOTES
Data- discharge order received, pt verbalized agreement to discharge, disposition to previous residence,  for HHC/DME. Action- discharge instructions prepared and given to, pt verbalized understanding. Medication information packet given r/t NEW and/or CHANGED prescriptions emphasizing name/purpose/side effects, pt verbalized understanding. Discharge instruction summary: Diet-general, Activity- as tolerated, Primary Care Physician as follows: Carolann Shaw -[phone] f/u appointment , immunizations reviewed . Response- Pt belongings gathered, . Disposition is home  transported , taken to lobby via w/c w/  no complications.

## 2020-08-08 NOTE — PROGRESS NOTES
Patient admitted to rehab with s/p  I & D of the left knee. Patient is alert and oriented x 4 and takes medications whole with thin liquids. Patient is on a general diet. Transfers with a walker x 1 person assist and a gait belt. Patient is continent of bowel and bladder. LBM 8/8/20. Call light in reach and rounding done to anticipate needs. Chair and bed alarm set for safety.

## 2020-08-08 NOTE — PLAN OF CARE
Problem: Falls - Risk of:  Goal: Will remain free from falls  Description: Will remain free from falls  8/8/2020 1029 by Enzo Bates RN  Outcome: Ongoing  8/8/2020 0454 by Lakia Del Real RN  Outcome: Ongoing  Goal: Absence of physical injury  Description: Absence of physical injury  8/8/2020 1029 by Enzo Bates RN  Outcome: Ongoing  8/8/2020 0454 by Lakia Del Real RN  Outcome: Ongoing

## 2020-08-08 NOTE — PLAN OF CARE
Problem: Falls - Risk of:  Goal: Will remain free from falls  Description: Will remain free from falls  8/8/2020 0454 by Dejah Stone RN  Outcome: Ongoing  Patient calls for assistance appropriately     Problem: Falls - Risk of:  Goal: Absence of physical injury  Description: Absence of physical injury  8/8/2020 0454 by Dejah Stone RN  Outcome: Ongoing     Problem: Pain:  Goal: Pain level will decrease  Description: Pain level will decrease  8/8/2020 0454 by Dejha Stone RN  Outcome: Met This Shift  No c/o of pain over night.      Problem: Pain:  Goal: Control of acute pain  Description: Control of acute pain  8/8/2020 0454 by Dejah Stone RN  Outcome: Met This Shift

## 2020-08-08 NOTE — PLAN OF CARE
Problem: Falls - Risk of:  Goal: Will remain free from falls  Description: Will remain free from falls  8/8/2020 1029 by Mick Tan RN  Outcome: Ongoing    Outcome: Ongoing  Goal: Absence of physical injury  Description: Absence of physical injury  8/8/2020 1029 by Mick Tan RN  Outcome: Ongoing    Outcome: Ongoing

## 2020-08-08 NOTE — PROGRESS NOTES
No new events over night, patient slept well. Denies pain, no BM in several days was given Dulcolax Friday evening no results yet. The patient denies any discomfort. Call light placed within reach and will continue to monitor.

## 2020-08-10 ENCOUNTER — TELEPHONE (OUTPATIENT)
Dept: ORTHOPEDIC SURGERY | Age: 85
End: 2020-08-10

## 2020-08-10 NOTE — TELEPHONE ENCOUNTER
She wanted to let us know that she did not look at her wound due to patient stating that she didn't want the dressing to be taken off. She wanted to know if you wanted them to do any type of care for patient.   She can be reached at [phone]

## 2020-08-11 ENCOUNTER — TELEPHONE (OUTPATIENT)
Dept: ORTHOPEDIC SURGERY | Age: 85
End: 2020-08-11

## 2020-08-11 NOTE — TELEPHONE ENCOUNTER
Called and left message for Erna Humphrey to call office back    **per SMA okay to chance dressing, dry dressing**

## 2020-08-11 NOTE — TELEPHONE ENCOUNTER
She is returning a call from yesterday and she was wondering if she needs to do anything for the patient until she has the appt.   She can be reached at [phone]

## 2020-08-11 NOTE — DISCHARGE SUMMARY
Pernicious anemia  -Hgb stable >11  -B12 wnl     MARILIA on CKD  -Avoid nephrotoxins, renally dose meds  -Cr now improved to 1.0     HTN  -triamterene-HCTZ     Asthma  -Symbicort, prn duoneb     GERD  -Pantroprazole, prn TUMS     Osteoporosis  -Calcium+Vitamin D supplement     H/o breast cancer  -Anastrazole     Depression/Anxiety  -Venlafaxine, prn clonazepam     Idiopathic Neuropathy  -Contributing to fall  -Per patient has had work-up by PCP.   -B12 this admission wnl  -PT/OT      Pain control  -Gabapentin, PRN acetaminophen     PPx  -DVT: lovenox while inpatient then ASA BID x 14 days per Ortho  -GI: pantoprazole    Discharge Exam:  Const: Alert. No distress, pleasant. HEENT: Normocephalic, atraumatic. Normal sclera/conjunctiva. MMM. CV: Regular rate and rhythm. Resp: No respiratory distress. Lungs CTAB. Abd: Soft, nontender, nondistended, NABS+   Ext: No edema. MSK: Left knee ROM decreased, dressed with ace wrap, c/d/i  Neuro: Alert, oriented, appropriately interactive. Psych: Cooperative, appropriate mood and affect         Discharge Functional Status:    Physical therapy:  Bed Mobility: Scooting: Independent  Transfers: Sit to Stand: Modified independent  Stand to sit: Modified independent  Bed to Chair: Modified independent  Comment: Patient in recliner after breakfast, still in pajamas. Patient preferred to put clothes on to participate with therapy, so allowed patient to dress and assisted with set up due to time constraints. Will formally address bathing and dressing with OT later. Patient requested to use the bathroom and brush her hair prior to leaving her room. Able to safely manage wheeled walker in bathroom with mod I.   Toilet transfers and toileting with mod I - able to manage pants down/up and perform pericare with mod I. Stood at sink to wash hands and brush her hair with mod I., WB Status: WBAT  Ambulation 1  Surface: level tile  Device: Rolling Walker  Assistance: Modified Independent  Quality of Gait: step to pattern w/ decreased step length and height, improved control of RW, occasional cues to improve posture overall but with slight improve ability to identify need for posture modifications  Gait Deviations: Slow Sindi, Decreased step length, Decreased step height  Distance: 200' X 2 trials with multiple turns, shorter distances in therapy gym with multiple turns to access curb and steps, 125' outdoors on uneven surfaces, 100' back to her room, short distances in her room to access toilet, sink, bed and recliner for personal care needs  Comments: pt tolerates well with only mild fatigue and mild LR; suggested that patient may want to use wheeled walker on one level of her home and rollator on other level of her home to limit need to carry walker between levels - will defer further recommendations to home PT, Stairs  # Steps : 12  Stairs Height: 6\"  Rails: Right ascending  Curbs: 6\"(X 1 trial with RW supervision with cues for safety with participation)  Device: Rolling walker(with curb and folded wheeled walker with steps)  Assistance: Modified independent   Comment: Curb step with wheeled walker and modified independence, recalled proper sequence and able to safely manage wheeled walker. 12 steps with 1 rail (R rail ascending)  and modified independent, recalled correct sequencing with nonreciprocal gait pattern ascending with R LE first and descending with L LE first.  Complained of shortness of breath and fatigue, requiring seated rest. After seated rest, ascend/descended 4 steps with 1 rail and folded walker with supervision/mod I - uncertain if she will use rollator on level of kitchen and wheeled walker on level with bed and bathroom, will carry wheeled walker between floors, or will have her spouse carry wheeled walker between floors.   Mobility:  , PT Equipment Recommendations  Equipment Needed: No  Other: pt owns RW, rollator, w/c, and SPC, Assessment: Patient has met

## 2020-08-11 NOTE — TELEPHONE ENCOUNTER
Called and spoke to Candi Spaulding and informed her that patients dressing may be changed if soiled to a dry dressing.  Candi Spaulding in agreement to plan

## 2020-08-14 ENCOUNTER — OFFICE VISIT (OUTPATIENT)
Dept: ORTHOPEDIC SURGERY | Age: 85
End: 2020-08-14
Payer: MEDICARE

## 2020-08-14 VITALS — TEMPERATURE: 97 F | BODY MASS INDEX: 26.63 KG/M2 | WEIGHT: 156 LBS | HEIGHT: 64 IN

## 2020-08-14 PROCEDURE — 4040F PNEUMOC VAC/ADMIN/RCVD: CPT | Performed by: ORTHOPAEDIC SURGERY

## 2020-08-14 PROCEDURE — 1123F ACP DISCUSS/DSCN MKR DOCD: CPT | Performed by: ORTHOPAEDIC SURGERY

## 2020-08-14 PROCEDURE — 1090F PRES/ABSN URINE INCON ASSESS: CPT | Performed by: ORTHOPAEDIC SURGERY

## 2020-08-14 PROCEDURE — G8417 CALC BMI ABV UP PARAM F/U: HCPCS | Performed by: ORTHOPAEDIC SURGERY

## 2020-08-14 PROCEDURE — 1111F DSCHRG MED/CURRENT MED MERGE: CPT | Performed by: ORTHOPAEDIC SURGERY

## 2020-08-14 PROCEDURE — G8399 PT W/DXA RESULTS DOCUMENT: HCPCS | Performed by: ORTHOPAEDIC SURGERY

## 2020-08-14 PROCEDURE — 99213 OFFICE O/P EST LOW 20 MIN: CPT | Performed by: ORTHOPAEDIC SURGERY

## 2020-08-14 PROCEDURE — 1036F TOBACCO NON-USER: CPT | Performed by: ORTHOPAEDIC SURGERY

## 2020-08-14 PROCEDURE — G8427 DOCREV CUR MEDS BY ELIG CLIN: HCPCS | Performed by: ORTHOPAEDIC SURGERY

## 2020-08-21 ENCOUNTER — OFFICE VISIT (OUTPATIENT)
Dept: ORTHOPEDIC SURGERY | Age: 85
End: 2020-08-21
Payer: MEDICARE

## 2020-08-21 ENCOUNTER — HOSPITAL ENCOUNTER (OUTPATIENT)
Dept: WOMENS IMAGING | Age: 85
Discharge: HOME | End: 2020-08-21
Payer: MEDICARE

## 2020-08-21 VITALS — BODY MASS INDEX: 26.63 KG/M2 | HEIGHT: 64 IN | TEMPERATURE: 97.7 F | WEIGHT: 156 LBS

## 2020-08-21 PROCEDURE — 4040F PNEUMOC VAC/ADMIN/RCVD: CPT | Performed by: ORTHOPAEDIC SURGERY

## 2020-08-21 PROCEDURE — G8427 DOCREV CUR MEDS BY ELIG CLIN: HCPCS | Performed by: ORTHOPAEDIC SURGERY

## 2020-08-21 PROCEDURE — 99213 OFFICE O/P EST LOW 20 MIN: CPT | Performed by: ORTHOPAEDIC SURGERY

## 2020-08-21 PROCEDURE — 1090F PRES/ABSN URINE INCON ASSESS: CPT | Performed by: ORTHOPAEDIC SURGERY

## 2020-08-21 PROCEDURE — G8399 PT W/DXA RESULTS DOCUMENT: HCPCS | Performed by: ORTHOPAEDIC SURGERY

## 2020-08-21 PROCEDURE — G8417 CALC BMI ABV UP PARAM F/U: HCPCS | Performed by: ORTHOPAEDIC SURGERY

## 2020-08-21 PROCEDURE — 77080 DXA BONE DENSITY AXIAL: CPT

## 2020-08-21 PROCEDURE — 1036F TOBACCO NON-USER: CPT | Performed by: ORTHOPAEDIC SURGERY

## 2020-08-21 PROCEDURE — 1123F ACP DISCUSS/DSCN MKR DOCD: CPT | Performed by: ORTHOPAEDIC SURGERY

## 2020-08-21 PROCEDURE — 1111F DSCHRG MED/CURRENT MED MERGE: CPT | Performed by: ORTHOPAEDIC SURGERY

## 2020-08-21 NOTE — PROGRESS NOTES
DIAGNOSIS:  Left complex large traumatic laceration measuring 10 cm, status post wound exploration and debridement and coagulation of bleeding. DATE OF SURGERY: 2020. HISTORY OF PRESENT ILLNESS:  Ms. Yonas Ramirez 82 y.o.  female who came in today for 3 weeks postoperative visit. The patient denies any significant pain in the left knee. Rates pain a 0/10 VAS. She has been WBAT. No numbness or tingling sensation. No fever or Chills. She has completed antibiotics and has kept her left knee dry, changing the dressing on her own daily.     Past Medical History:   Diagnosis Date    Asthma     Cancer ([facility] [address]     Breast cancer on left side     CKD (chronic kidney disease)     Depression     GERD (gastroesophageal reflux disease)     Neuropathy     Osteoporosis     Pernicious anemia     Scoliosis        Past Surgical History:   Procedure Laterality Date     SECTION       SECTION      x`s    KNEE SURGERY Left 2020    INCISION AND DRAINAGE LEFT KNEE performed by Bella Solo MD at [address] [place] [address] History     Socioeconomic History    Marital status:      Spouse name: Not on file    Number of children: Not on file    Years of education: Not on file    Highest education level: Not on file   Occupational History    Not on file   Social Needs    Financial resource strain: Not on file    Food insecurity     Worry: Not on file     Inability: Not on file    Transportation needs     Medical: Not on file     Non-medical: Not on file   Tobacco Use    Smoking status: Never Smoker    Smokeless tobacco: Never Used   Substance and Sexual Activity    Alcohol use: No    Drug use: No    Sexual activity: Not Currently   Lifestyle    Physical activity     Days per week: Not on file     Minutes per session: Not on file    Stress: Not on file   Relationships    Social connections     Talks on phone: Not on file     Gets together: Not on file     Attends Yazidism service: (EFFEXOR XR) 150 MG extended release capsule Take 150 mg by mouth daily      Ascorbic Acid (VITAMIN C) 250 MG tablet Take 250 mg by mouth 3 times daily       calcium citrate-vitamin D (CITRICAL + D) 315-250 MG-UNIT TABS per tablet Take 1 tablet by mouth 2 times daily (with meals)       No current facility-administered medications on file prior to visit. Pertinent items are noted in HPI  Review of systems reviewed from Patient History Form dated on 2020 and available in the patient's chart under the Media tab. No change noted. PHYSICAL EXAMINATION:  Ms. Gabe Burgos is a very pleasant 82 y.o.  female who presents today in no acute distress, awake, alert, and oriented. She is well dressed, nourished and  groomed. Patient with normal affect. Height is  5' 4\" (1.626 m), weight is 156 lb (70.8 kg), Body mass index is 26.78 kg/m². Resting respiratory rate is 16.      Past Medical History:   Diagnosis Date    Asthma     Cancer ([facility] [address]     Breast cancer on left side     CKD (chronic kidney disease)     Depression     GERD (gastroesophageal reflux disease)     Neuropathy     Osteoporosis     Pernicious anemia     Scoliosis        Past Surgical History:   Procedure Laterality Date     SECTION       SECTION      x`s    KNEE SURGERY Left 2020    INCISION AND DRAINAGE LEFT KNEE performed by Gaby Hinton MD at [address] [place] [address] History     Socioeconomic History    Marital status:      Spouse name: Not on file    Number of children: Not on file    Years of education: Not on file    Highest education level: Not on file   Occupational History    Not on file   Social Needs    Financial resource strain: Not on file    Food insecurity     Worry: Not on file     Inability: Not on file    Transportation needs     Medical: Not on file     Non-medical: Not on file   Tobacco Use    Smoking status: Never Smoker    Smokeless tobacco: Never Used   Substance and Sexual Activity    Alcohol use: No    Drug use: No    Sexual activity: Not Currently   Lifestyle    Physical activity     Days per week: Not on file     Minutes per session: Not on file    Stress: Not on file   Relationships    Social connections     Talks on phone: Not on file     Gets together: Not on file     Attends Zoroastrian service: Not on file     Active member of club or organization: Not on file     Attends meetings of clubs or organizations: Not on file     Relationship status: Not on file    Intimate partner violence     Fear of current or ex partner: Not on file     Emotionally abused: Not on file     Physically abused: Not on file     Forced sexual activity: Not on file   Other Topics Concern    Not on file   Social History Narrative    Not on file       Family History   Problem Relation Age of Onset    Cancer Mother     Heart Disease Father        Current Outpatient Medications on File Prior to Visit   Medication Sig Dispense Refill    omeprazole (PRILOSEC) 20 MG delayed release capsule Take 20 mg by mouth daily      Multiple Vitamins-Minerals (CENTRUM SILVER 50+WOMEN) TABS Take 1 tablet by mouth daily      gabapentin (NEURONTIN) 100 MG capsule Take 200 mg by mouth nightly.  calcium carbonate (TUMS) 500 MG chewable tablet Take 1 tablet by mouth 3 times daily as needed for Heartburn      aspirin EC 81 MG EC tablet Take 1 tablet by mouth 2 times daily for 14 days Please avoid missing doses.  28 tablet 0    anastrozole (ARIMIDEX) 1 MG tablet Take 1 mg by mouth daily      albuterol sulfate  (90 Base) MCG/ACT inhaler Inhale 2 puffs into the lungs every 6 hours as needed for Wheezing      fluticasone-salmeterol (ADVAIR HFA) 115-21 MCG/ACT inhaler Inhale 2 puffs into the lungs 2 times daily      bismuth subsalicylate (PEPTO BISMOL) 262 MG chewable tablet Take 262 mg by mouth 4 times daily as needed       triamterene-hydrochlorothiazide (MAXZIDE) 75-50 MG per tablet       Ergocalciferol (VITAMIN D2 PO) Take 50,000 Units by mouth Twice a Week Twice weekly       alendronate (FOSAMAX) 70 MG tablet Take 70 mg by mouth every 7 days      clonazePAM (KLONOPIN) 0.5 MG tablet Take 0.5 mg by mouth 2 times daily as needed.  RaNITidine HCl (RANITIDINE 75 PO) Take 150 mg by mouth 2 times daily as needed       venlafaxine (EFFEXOR XR) 150 MG extended release capsule Take 150 mg by mouth daily      Ascorbic Acid (VITAMIN C) 250 MG tablet Take 250 mg by mouth 3 times daily       calcium citrate-vitamin D (CITRICAL + D) 315-250 MG-UNIT TABS per tablet Take 1 tablet by mouth 2 times daily (with meals)       No current facility-administered medications on file prior to visit. Pertinent items are noted in HPI  Review of systems reviewed from Patient History Form dated on 8/21/2020 and available in the patient's chart under the Media tab. No change noted. PHYSICAL EXAMINATION:  Ms. Jarrell Lefort is a very pleasant 82 y.o.  female who presents today in no acute distress, awake, alert, and oriented. She is well dressed, nourished and  groomed. Patient with normal affect. Height is  5' 4\" (1.626 m), weight is 156 lb (70.8 kg), Body mass index is 26.78 kg/m². Resting respiratory rate is 16. She ambulates with slow gate with assistance of a walker. The incision healing well . No signs of any erythema or drainage, minimal swelling. She has no pain with the active or passive range of motion of the left knee, good ROM. She has intact sensation distally, and she is neurovascularly intact. The knee laceration is healed. No instability left knee. IMPRESSION:  3 weeks out from left complex large traumatic laceration measuring 10 cm, status post wound exploration and debridement and coagulation of bleeding, and doing very well. PLAN: She can go back to normal activity with no heavy impact activities for 6 weeks. Sutures removed and doing well.  The patient will come back for a follow up in 6 weeks PRN.           Radha Rollins MD

## 2020-08-22 NOTE — PROGRESS NOTES
DIAGNOSIS:  Left complex large traumatic laceration measuring 10 cm, status post wound exploration and debridement and coagulation of bleeding.     DATE OF SURGERY: 2020.     HISTORY OF PRESENT ILLNESS:  Two Dot Jenifer 82 y.o.  female who came in today for 2  weeks postoperative visit. The patient denies any significant pain in the left knee. Rates pain a 2/10 VAS. She has been WBAT. No numbness or tingling sensation. No fever or Chills.  She has completed antibiotics and has kept her left knee dry, changing the dressing on her own daily.     Past Medical History:   Diagnosis Date    Asthma     Cancer ([facility] [address]     Breast cancer on left side     CKD (chronic kidney disease)     Depression     GERD (gastroesophageal reflux disease)     Neuropathy     Osteoporosis     Pernicious anemia     Scoliosis        Past Surgical History:   Procedure Laterality Date     SECTION       SECTION      x`s    KNEE SURGERY Left 2020    INCISION AND DRAINAGE LEFT KNEE performed by Feng Napier MD at [address] [place] [address] History     Socioeconomic History    Marital status:      Spouse name: Not on file    Number of children: Not on file    Years of education: Not on file    Highest education level: Not on file   Occupational History    Not on file   Social Needs    Financial resource strain: Not on file    Food insecurity     Worry: Not on file     Inability: Not on file    Transportation needs     Medical: Not on file     Non-medical: Not on file   Tobacco Use    Smoking status: Never Smoker    Smokeless tobacco: Never Used   Substance and Sexual Activity    Alcohol use: No    Drug use: No    Sexual activity: Not Currently   Lifestyle    Physical activity     Days per week: Not on file     Minutes per session: Not on file    Stress: Not on file   Relationships    Social connections     Talks on phone: Not on file     Gets together: Not on file     Attends Confucianist service: Not on file     Active member of club or organization: Not on file     Attends meetings of clubs or organizations: Not on file     Relationship status: Not on file    Intimate partner violence     Fear of current or ex partner: Not on file     Emotionally abused: Not on file     Physically abused: Not on file     Forced sexual activity: Not on file   Other Topics Concern    Not on file   Social History Narrative    Not on file       Family History   Problem Relation Age of Onset    Cancer Mother     Heart Disease Father        Current Outpatient Medications on File Prior to Visit   Medication Sig Dispense Refill    omeprazole (PRILOSEC) 20 MG delayed release capsule Take 20 mg by mouth daily      Multiple Vitamins-Minerals (CENTRUM SILVER 50+WOMEN) TABS Take 1 tablet by mouth daily      gabapentin (NEURONTIN) 100 MG capsule Take 200 mg by mouth nightly.  calcium carbonate (TUMS) 500 MG chewable tablet Take 1 tablet by mouth 3 times daily as needed for Heartburn      aspirin EC 81 MG EC tablet Take 1 tablet by mouth 2 times daily for 14 days Please avoid missing doses. 28 tablet 0    anastrozole (ARIMIDEX) 1 MG tablet Take 1 mg by mouth daily      albuterol sulfate  (90 Base) MCG/ACT inhaler Inhale 2 puffs into the lungs every 6 hours as needed for Wheezing      fluticasone-salmeterol (ADVAIR HFA) 115-21 MCG/ACT inhaler Inhale 2 puffs into the lungs 2 times daily      bismuth subsalicylate (PEPTO BISMOL) 262 MG chewable tablet Take 262 mg by mouth 4 times daily as needed       triamterene-hydrochlorothiazide (MAXZIDE) 75-50 MG per tablet       Ergocalciferol (VITAMIN D2 PO) Take 50,000 Units by mouth Twice a Week Twice weekly       alendronate (FOSAMAX) 70 MG tablet Take 70 mg by mouth every 7 days      clonazePAM (KLONOPIN) 0.5 MG tablet Take 0.5 mg by mouth 2 times daily as needed.       RaNITidine HCl (RANITIDINE 75 PO) Take 150 mg by mouth 2 times daily as needed       venlafaxine (EFFEXOR XR) 150 MG extended release capsule Take 150 mg by mouth daily      Ascorbic Acid (VITAMIN C) 250 MG tablet Take 250 mg by mouth 3 times daily       calcium citrate-vitamin D (CITRICAL + D) 315-250 MG-UNIT TABS per tablet Take 1 tablet by mouth 2 times daily (with meals)       No current facility-administered medications on file prior to visit. Pertinent items are noted in HPI  Review of systems reviewed from Patient History Form dated on 8/21/2020 and available in the patient's chart under the Media tab. No change noted. PHYSICAL EXAMINATION:  Ms. Hanh Boswell is a very pleasant 82 y.o.  female who presents today in no acute distress, awake, alert, and oriented. She is well dressed, nourished and  groomed. Patient with normal affect. Height is  5' 4\" (1.626 m), weight is 156 lb (70.8 kg), Body mass index is 26.78 kg/m². Resting respiratory rate is 16. She ambulates with slow gate with assistance of a walker. The incision healing well . No signs of any erythema or drainage, minimal swelling. She has no pain with the active or passive range of motion of the left knee, good ROM. She has intact sensation distally, and she is neurovascularly intact. The knee laceration is healed. No instability left knee.           IMPRESSION:  2 weeks out from left complex large traumatic laceration measuring 10 cm, status post wound exploration and debridement and coagulation of bleeding, and doing very well.     PLAN: She can go back to normal activity with no heavy impact activities for 6 weeks.  F/U next week for sutures removal.        China Matos MD

## 2021-07-14 ENCOUNTER — HOSPITAL ENCOUNTER (OUTPATIENT)
Dept: WOMENS IMAGING | Age: 86
Discharge: HOME | End: 2021-07-14
Payer: MEDICARE

## 2021-07-14 ENCOUNTER — OFFICE VISIT (OUTPATIENT)
Dept: BREAST CENTER | Age: 86
End: 2021-07-14
Payer: MEDICARE

## 2021-07-14 VITALS
WEIGHT: 149 LBS | DIASTOLIC BLOOD PRESSURE: 65 MMHG | HEART RATE: 83 BPM | SYSTOLIC BLOOD PRESSURE: 135 MMHG | BODY MASS INDEX: 25.58 KG/M2

## 2021-07-14 DIAGNOSIS — Z08 ENCOUNTER FOR FOLLOW-UP SURVEILLANCE OF BREAST CANCER: ICD-10-CM

## 2021-07-14 DIAGNOSIS — Z85.3 HISTORY OF BREAST CANCER: ICD-10-CM

## 2021-07-14 DIAGNOSIS — Z12.31 ENCOUNTER FOR SCREENING MAMMOGRAM FOR BREAST CANCER: ICD-10-CM

## 2021-07-14 DIAGNOSIS — Z12.31 VISIT FOR SCREENING MAMMOGRAM: ICD-10-CM

## 2021-07-14 DIAGNOSIS — Z12.39 ENCOUNTER FOR SCREENING BREAST EXAMINATION: Primary | ICD-10-CM

## 2021-07-14 DIAGNOSIS — Z85.3 ENCOUNTER FOR FOLLOW-UP SURVEILLANCE OF BREAST CANCER: ICD-10-CM

## 2021-07-14 DIAGNOSIS — Z90.12 S/P PARTIAL MASTECTOMY, LEFT: ICD-10-CM

## 2021-07-14 PROCEDURE — G8417 CALC BMI ABV UP PARAM F/U: HCPCS | Performed by: NURSE PRACTITIONER

## 2021-07-14 PROCEDURE — 1090F PRES/ABSN URINE INCON ASSESS: CPT | Performed by: NURSE PRACTITIONER

## 2021-07-14 PROCEDURE — G8427 DOCREV CUR MEDS BY ELIG CLIN: HCPCS | Performed by: NURSE PRACTITIONER

## 2021-07-14 PROCEDURE — 99213 OFFICE O/P EST LOW 20 MIN: CPT | Performed by: NURSE PRACTITIONER

## 2021-07-14 PROCEDURE — 77067 SCR MAMMO BI INCL CAD: CPT

## 2021-07-14 PROCEDURE — 4040F PNEUMOC VAC/ADMIN/RCVD: CPT | Performed by: NURSE PRACTITIONER

## 2021-07-14 PROCEDURE — 1036F TOBACCO NON-USER: CPT | Performed by: NURSE PRACTITIONER

## 2021-07-14 PROCEDURE — G8399 PT W/DXA RESULTS DOCUMENT: HCPCS | Performed by: NURSE PRACTITIONER

## 2021-07-14 PROCEDURE — 1123F ACP DISCUSS/DSCN MKR DOCD: CPT | Performed by: NURSE PRACTITIONER

## 2021-07-14 NOTE — PATIENT INSTRUCTIONS
Healthy Lifestyle Recommendations: healthy diet (decrease consumption of red meat, increase fresh fruits and vegetables), decreased alcohol consumption (less than 4 drinks/week), adequate sleep (goal 6-8 hours), routine exercise (goal 150 minutes/week or greater), weight control. Patient Education        Breast Self-Exam: Care Instructions  Your Care Instructions     A breast self-exam is when you check your breasts for lumps or changes. This regular exam helps you learn how your breasts normally look and feel. Most breast problems or changes are not because of cancer. Breast self-exam is not a substitute for a mammogram. Having regular breast exams by your doctor and regular mammograms improve your chances of finding any problems with your breasts. Some women set a time each month to do a step-by-step breast self-exam. Other women like a less formal system. They might look at their breasts as they brush their teeth, or feel their breasts once in a while in the shower. If you notice a change in your breast, tell your doctor. Follow-up care is a key part of your treatment and safety. Be sure to make and go to all appointments, and call your doctor if you are having problems. It's also a good idea to know your test results and keep a list of the medicines you take. How do you do a breast self-exam?  · The best time to examine your breasts is usually one week after your menstrual period begins. Your breasts should not be tender then. If you do not have periods, you might do your exam on a day of the month that is easy to remember. · To examine your breasts:  ? Remove all your clothes above the waist and lie down. When you are lying down, your breast tissue spreads evenly over your chest wall, which makes it easier to feel all your breast tissue. ?  Use the pads--not the fingertips--of the 3 middle fingers of your left hand to check your right breast. Move your fingers slowly in small coin-sized circles that

## 2021-07-14 NOTE — PROGRESS NOTES
Surgical Breast Oncology       CC: One year [id number] for breast check and mammogram     HPI: Jacky Carrasquillo is a 82 y.o. woman here for annual follow up for breast check secondary to personal history of breast cancer. She is s/p left lumpectomy, snbx, [facility] 3/2018. 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 Microinvasion had been noted on prior bx. TisN0. ER/MS positive. Postop radiation. On Arimidex and tolerating it well. She has no breast related concerns today. She states that she does not perform routine self breast evaluations and has not noticed any new abnormalities such as masses, skin changes, color changes, nipple discharge, or changes to the nipple-areolar complex. Mammogram today reveals no direct or indirect signs of malignancy. BI-RADS 2.           Past Medical History:   Diagnosis Date    Asthma     Cancer ([facility] [address]     Breast cancer on left side     CKD (chronic kidney disease)     Depression     GERD (gastroesophageal reflux disease)     Neuropathy     Osteoporosis     Pernicious anemia     Scoliosis        Past Surgical History:   Procedure Laterality Date     SECTION       SECTION      x`s    KNEE SURGERY Left 2020    INCISION AND DRAINAGE LEFT KNEE performed by Margarito Gabriel MD at [address] [place] [address] History   Problem Relation Age of Onset    Cancer Mother     Heart Disease Father        Allergies as of 2021 - Fully Reviewed 2021   Allergen Reaction Noted    Latex Hives and Itching 2015       Social History     Tobacco Use    Smoking status: Never Smoker    Smokeless tobacco: Never Used   Vaping Use    Vaping Use: Never used   Substance Use Topics    Alcohol use: No    Drug use: No       Current Outpatient Medications on File Prior to Visit   Medication Sig Dispense Refill    omeprazole (PRILOSEC) 20 MG delayed release capsule Take 20 mg by mouth daily      Multiple Vitamins-Minerals (CENTRUM SILVER 50+WOMEN) TABS Take 1 tablet by mouth daily      anastrozole (ARIMIDEX) 1 MG tablet Take 1 mg by mouth daily      albuterol sulfate  (90 Base) MCG/ACT inhaler Inhale 2 puffs into the lungs every 6 hours as needed for Wheezing      fluticasone-salmeterol (ADVAIR HFA) 115-21 MCG/ACT inhaler Inhale 2 puffs into the lungs 2 times daily      bismuth subsalicylate (PEPTO BISMOL) 262 MG chewable tablet Take 262 mg by mouth 4 times daily as needed       triamterene-hydrochlorothiazide (MAXZIDE) 75-50 MG per tablet       Ergocalciferol (VITAMIN D2 PO) Take 50,000 Units by mouth Twice a Week Twice weekly       alendronate (FOSAMAX) 70 MG tablet Take 70 mg by mouth every 7 days      clonazePAM (KLONOPIN) 0.5 MG tablet Take 0.5 mg by mouth 2 times daily as needed.  RaNITidine HCl (RANITIDINE 75 PO) Take 150 mg by mouth 2 times daily as needed       Ascorbic Acid (VITAMIN C) 250 MG tablet Take 250 mg by mouth 3 times daily       calcium citrate-vitamin D (CITRICAL + D) 315-250 MG-UNIT TABS per tablet Take 1 tablet by mouth 2 times daily (with meals)      gabapentin (NEURONTIN) 100 MG capsule Take 200 mg by mouth nightly.  aspirin EC 81 MG EC tablet Take 1 tablet by mouth 2 times daily for 14 days Please avoid missing doses. 28 tablet 0    venlafaxine (EFFEXOR XR) 150 MG extended release capsule Take 150 mg by mouth daily       No current facility-administered medications on file prior to visit. Medications: documentation has been reviewed in the electronic medical record and patient office intake form.      REVIEW OF SYSTEMS:  Constitutional: Negative for fever  HENT: Negative for sore throat  Eyes: Negative for redness   Respiratory: Negative for dyspnea, cough  Cardiovascular: Negative for chest pain  Gastrointestinal: Negative for vomiting, diarrhea   Genitourinary: Negative for hematuria   Musculoskeletal: Negative for arthralgias   Skin: Negative for rash  Neurological: Negative for syncope  Hematological: Negative for adenopathy  Psychiatric/Behavorial: Negative for anxiety         PHYSICAL EXAM:  /65   Pulse 83   Wt 149 lb (67.6 kg)   BMI 25.58 kg/m²   Constitutional: She appearswell-nourished. No apparent distress. Breast: The patient was examined in the upright and supine position. Breasts are symmetrically ptotic. Right: No new masses or changes in breast contour. No skin changes of the breast or nipple areolar complex. No nipple inversion or discharge. No erythema, thickening (peau d'orange), or dimpling. Left: well healed scar and expected post surgical changes. No new masses or changes in breast contour. No skin changes of the breast or nipple areolar complex. No nipple inversion or discharge. No erythema, thickening (peau d'orange), or dimpling. There is no axillary lymphadenopathy palpated bilaterally. Head: Normocephalic and atraumatic:   Eyes: EOM are normal. Pupils are equal, round, and reactive to light. Neck: Neck supple. No tracheal deviation present. No obvious mass. Lymphatics: No palpable supraclavicular, cervical, or axillary lymphadenopathy  Skin: No rash noted. No erythema. Neurologic: alert and oriented. Extremities: appear well perfused. No edema. No joint deformity         ASSESSMENT:  - Screening Breast Examination - stable breast examination and stable bilateral screening mammogram.    - Personal History of Breast Cancer - s/p left partial mastectomy, SLNB, [facility] 3/2018. 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 Microinvasion had been noted on prior bx. TisN0. ER/OR positive. Postop radiation. On Arimidex  - Encounter for follow-up surveillance of breast cancer       PLAN:    Continue annual screening mammography with tomosynthesis   Continue annual clinical breast exams    Continue endocrine therapy per medical oncology    Signs/symptoms of recurrence were reviewed.   She verbalizes understanding that she should notify the office if she identifies any abnormalities on self evaluation.  Follow up cancer surveillance discussed    Discussed the importance of breast awareness including the importance and technique of self breast exams   Today's imaging was reviewed and the results were discussed with the patient, all questions answered   Education provided for Healthy Lifestyle Recommendations: healthy diet, routine exercise, weight control, decreased alcohol consumption.  Follow up after annual mammogram in 1 year          LEXIS Beverly  [facility])   Surgical Breast Oncology   [phone]    All of the patient's questions were answered at this time however, she was encouraged to call the office with any further inquiries. Approximately 20 minutes of time were spent in preparation, direct patient contact, counseling, care coordination, documentation and activities otherwise related to this encounter.

## 2021-08-13 ENCOUNTER — HOSPITAL ENCOUNTER (OUTPATIENT)
Dept: ULTRASOUND IMAGING | Age: 86
Discharge: HOME | End: 2021-08-13
Payer: MEDICARE

## 2021-08-13 DIAGNOSIS — E04.9 NON-TOXIC GOITER: ICD-10-CM

## 2021-08-13 DIAGNOSIS — D05.12 INTRADUCTAL CARCINOMA IN SITU OF LEFT BREAST: ICD-10-CM

## 2021-08-13 PROCEDURE — 76536 US EXAM OF HEAD AND NECK: CPT

## 2021-08-20 ENCOUNTER — HOSPITAL ENCOUNTER (OUTPATIENT)
Dept: ULTRASOUND IMAGING | Age: 86
Discharge: HOME | End: 2021-08-20
Payer: MEDICARE

## 2021-08-20 DIAGNOSIS — E04.9 NON-TOXIC GOITER: ICD-10-CM

## 2021-08-20 PROCEDURE — 88305 TISSUE EXAM BY PATHOLOGIST: CPT

## 2021-08-20 PROCEDURE — 60100 BIOPSY OF THYROID: CPT

## 2021-08-20 PROCEDURE — 88173 CYTOPATH EVAL FNA REPORT: CPT

## 2021-08-20 NOTE — BRIEF OP NOTE
Brief Postoperative Note    Dunia Trimble  YOB: 1935  [id number]    Pre-operative Diagnosis: right thyroid nodule    Post-operative Diagnosis: Same    Procedure: US-guided right thyroid nodule FNA    Anesthesia: Local    Surgeons: Eri Zambrano MD    Estimated Blood Loss: Less than 5 mL    Complications: None    Specimens: Was Obtained: 5 FNA specimens    Findings: Successful US-guided right thyroid nodule FNA.     Electronically signed by Eri Zambrano MD on 8/20/2021 at 8:06 AM

## 2021-11-02 ENCOUNTER — HOSPITAL ENCOUNTER (INPATIENT)
Age: 86
LOS: 3 days | Discharge: HOME | DRG: 313 | End: 2021-11-05
Attending: EMERGENCY MEDICINE | Admitting: HOSPITALIST
Payer: MEDICARE

## 2021-11-02 ENCOUNTER — APPOINTMENT (OUTPATIENT)
Dept: GENERAL RADIOLOGY | Age: 86
DRG: 313 | End: 2021-11-02
Payer: MEDICARE

## 2021-11-02 DIAGNOSIS — R77.8 ELEVATED TROPONIN: ICD-10-CM

## 2021-11-02 DIAGNOSIS — R63.4 WEIGHT LOSS: ICD-10-CM

## 2021-11-02 DIAGNOSIS — R10.13 ABDOMINAL PAIN, EPIGASTRIC: Primary | ICD-10-CM

## 2021-11-02 DIAGNOSIS — R11.0 NAUSEA: ICD-10-CM

## 2021-11-02 PROBLEM — R07.9 CHEST PAIN: Status: ACTIVE | Noted: 2021-11-02

## 2021-11-02 LAB
A/G RATIO: 1.6 (ref 1.1–2.2)
ALBUMIN SERPL-MCNC: 3.9 G/DL (ref 3.4–5)
ALP BLD-CCNC: 61 U/L (ref 40–129)
ALT SERPL-CCNC: 13 U/L (ref 10–40)
ANION GAP SERPL CALCULATED.3IONS-SCNC: 12 MMOL/L (ref 3–16)
AST SERPL-CCNC: 20 U/L (ref 15–37)
BASOPHILS ABSOLUTE: 0 K/UL (ref 0–0.2)
BASOPHILS RELATIVE PERCENT: 0.8 %
BILIRUB SERPL-MCNC: 0.6 MG/DL (ref 0–1)
BILIRUBIN URINE: NEGATIVE
BLOOD, URINE: NEGATIVE
BUN BLDV-MCNC: 20 MG/DL (ref 7–20)
CALCIUM SERPL-MCNC: 10.5 MG/DL (ref 8.3–10.6)
CHLORIDE BLD-SCNC: 105 MMOL/L (ref 99–110)
CLARITY: CLEAR
CO2: 25 MMOL/L (ref 21–32)
COLOR: YELLOW
CREAT SERPL-MCNC: 0.9 MG/DL (ref 0.6–1.2)
EKG ATRIAL RATE: 76 BPM
EKG DIAGNOSIS: NORMAL
EKG P AXIS: 31 DEGREES
EKG P-R INTERVAL: 82 MS
EKG Q-T INTERVAL: 432 MS
EKG QRS DURATION: 152 MS
EKG QTC CALCULATION (BAZETT): 486 MS
EKG R AXIS: 57 DEGREES
EKG T AXIS: 6 DEGREES
EKG VENTRICULAR RATE: 76 BPM
EOSINOPHILS ABSOLUTE: 0.1 K/UL (ref 0–0.6)
EOSINOPHILS RELATIVE PERCENT: 1.7 %
GFR AFRICAN AMERICAN: >60
GFR NON-AFRICAN AMERICAN: 60
GLUCOSE BLD-MCNC: 83 MG/DL (ref 70–99)
GLUCOSE URINE: NEGATIVE MG/DL
HCT VFR BLD CALC: 36.7 % (ref 36–48)
HEMOGLOBIN: 11.9 G/DL (ref 12–16)
KETONES, URINE: NEGATIVE MG/DL
LEUKOCYTE ESTERASE, URINE: NEGATIVE
LYMPHOCYTES ABSOLUTE: 1 K/UL (ref 1–5.1)
LYMPHOCYTES RELATIVE PERCENT: 22.7 %
MCH RBC QN AUTO: 30.5 PG (ref 26–34)
MCHC RBC AUTO-ENTMCNC: 32.4 G/DL (ref 31–36)
MCV RBC AUTO: 94.1 FL (ref 80–100)
MICROSCOPIC EXAMINATION: NORMAL
MONOCYTES ABSOLUTE: 0.4 K/UL (ref 0–1.3)
MONOCYTES RELATIVE PERCENT: 9.8 %
NEUTROPHILS ABSOLUTE: 2.7 K/UL (ref 1.7–7.7)
NEUTROPHILS RELATIVE PERCENT: 65 %
NITRITE, URINE: NEGATIVE
PDW BLD-RTO: 14.1 % (ref 12.4–15.4)
PH UA: 6.5 (ref 5–8)
PLATELET # BLD: 159 K/UL (ref 135–450)
PMV BLD AUTO: 8.3 FL (ref 5–10.5)
POTASSIUM REFLEX MAGNESIUM: 4 MMOL/L (ref 3.5–5.1)
PROTEIN UA: NEGATIVE MG/DL
RBC # BLD: 3.91 M/UL (ref 4–5.2)
SODIUM BLD-SCNC: 142 MMOL/L (ref 136–145)
SPECIFIC GRAVITY UA: 1.01 (ref 1–1.03)
TOTAL PROTEIN: 6.4 G/DL (ref 6.4–8.2)
TROPONIN: 0.02 NG/ML
TROPONIN: 0.03 NG/ML
URINE REFLEX TO CULTURE: NORMAL
URINE TYPE: NORMAL
UROBILINOGEN, URINE: 0.2 E.U./DL
WBC # BLD: 4.2 K/UL (ref 4–11)

## 2021-11-02 PROCEDURE — 93010 ELECTROCARDIOGRAM REPORT: CPT | Performed by: INTERNAL MEDICINE

## 2021-11-02 PROCEDURE — 84484 ASSAY OF TROPONIN QUANT: CPT

## 2021-11-02 PROCEDURE — 80053 COMPREHEN METABOLIC PANEL: CPT

## 2021-11-02 PROCEDURE — 99284 EMERGENCY DEPT VISIT MOD MDM: CPT

## 2021-11-02 PROCEDURE — 93005 ELECTROCARDIOGRAM TRACING: CPT | Performed by: EMERGENCY MEDICINE

## 2021-11-02 PROCEDURE — 36415 COLL VENOUS BLD VENIPUNCTURE: CPT

## 2021-11-02 PROCEDURE — 85025 COMPLETE CBC W/AUTO DIFF WBC: CPT

## 2021-11-02 PROCEDURE — 81003 URINALYSIS AUTO W/O SCOPE: CPT

## 2021-11-02 PROCEDURE — 2060000000 HC ICU INTERMEDIATE R&B

## 2021-11-02 PROCEDURE — 71045 X-RAY EXAM CHEST 1 VIEW: CPT

## 2021-11-02 PROCEDURE — 6370000000 HC RX 637 (ALT 250 FOR IP): Performed by: EMERGENCY MEDICINE

## 2021-11-02 RX ORDER — SODIUM CHLORIDE 0.9 % (FLUSH) 0.9 %
5-40 SYRINGE (ML) INJECTION EVERY 12 HOURS SCHEDULED
Status: DISCONTINUED | OUTPATIENT
Start: 2021-11-03 | End: 2021-11-05 | Stop reason: HOSPADM

## 2021-11-02 RX ORDER — TRIAMTERENE AND HYDROCHLOROTHIAZIDE 75; 50 MG/1; MG/1
1 TABLET ORAL DAILY
Status: DISCONTINUED | OUTPATIENT
Start: 2021-11-03 | End: 2021-11-03

## 2021-11-02 RX ORDER — CALCIUM CARBONATE-CHOLECALCIFEROL TAB 250 MG-125 UNIT 250-125 MG-UNIT
1 TAB ORAL 2 TIMES DAILY WITH MEALS
Status: DISCONTINUED | OUTPATIENT
Start: 2021-11-03 | End: 2021-11-05 | Stop reason: HOSPADM

## 2021-11-02 RX ORDER — BISMUTH SUBSALICYLATE 262 MG/1
262 TABLET, CHEWABLE ORAL
Status: DISCONTINUED | OUTPATIENT
Start: 2021-11-03 | End: 2021-11-03

## 2021-11-02 RX ORDER — ONDANSETRON 4 MG/1
4 TABLET, ORALLY DISINTEGRATING ORAL EVERY 8 HOURS PRN
Status: DISCONTINUED | OUTPATIENT
Start: 2021-11-02 | End: 2021-11-05 | Stop reason: HOSPADM

## 2021-11-02 RX ORDER — SODIUM CHLORIDE 9 MG/ML
25 INJECTION, SOLUTION INTRAVENOUS PRN
Status: DISCONTINUED | OUTPATIENT
Start: 2021-11-02 | End: 2021-11-05 | Stop reason: HOSPADM

## 2021-11-02 RX ORDER — ONDANSETRON 2 MG/ML
4 INJECTION INTRAMUSCULAR; INTRAVENOUS ONCE
Status: DISCONTINUED | OUTPATIENT
Start: 2021-11-02 | End: 2021-11-05 | Stop reason: HOSPADM

## 2021-11-02 RX ORDER — ASPIRIN 81 MG/1
81 TABLET ORAL 2 TIMES DAILY
Status: DISCONTINUED | OUTPATIENT
Start: 2021-11-03 | End: 2021-11-03

## 2021-11-02 RX ORDER — ANASTROZOLE 1 MG/1
1 TABLET ORAL DAILY
Status: DISCONTINUED | OUTPATIENT
Start: 2021-11-03 | End: 2021-11-03

## 2021-11-02 RX ORDER — PANTOPRAZOLE SODIUM 40 MG/1
40 TABLET, DELAYED RELEASE ORAL
Status: DISCONTINUED | OUTPATIENT
Start: 2021-11-03 | End: 2021-11-05 | Stop reason: HOSPADM

## 2021-11-02 RX ORDER — BUDESONIDE AND FORMOTEROL FUMARATE DIHYDRATE 160; 4.5 UG/1; UG/1
2 AEROSOL RESPIRATORY (INHALATION) 2 TIMES DAILY
Status: DISCONTINUED | OUTPATIENT
Start: 2021-11-03 | End: 2021-11-05 | Stop reason: HOSPADM

## 2021-11-02 RX ORDER — ALBUTEROL SULFATE 90 UG/1
2 AEROSOL, METERED RESPIRATORY (INHALATION) EVERY 6 HOURS PRN
Status: DISCONTINUED | OUTPATIENT
Start: 2021-11-02 | End: 2021-11-03

## 2021-11-02 RX ORDER — SODIUM CHLORIDE 0.9 % (FLUSH) 0.9 %
5-40 SYRINGE (ML) INJECTION PRN
Status: DISCONTINUED | OUTPATIENT
Start: 2021-11-02 | End: 2021-11-05 | Stop reason: HOSPADM

## 2021-11-02 RX ORDER — ONDANSETRON 2 MG/ML
4 INJECTION INTRAMUSCULAR; INTRAVENOUS EVERY 6 HOURS PRN
Status: DISCONTINUED | OUTPATIENT
Start: 2021-11-02 | End: 2021-11-05 | Stop reason: HOSPADM

## 2021-11-02 RX ORDER — POLYETHYLENE GLYCOL 3350 17 G/17G
17 POWDER, FOR SOLUTION ORAL DAILY PRN
Status: DISCONTINUED | OUTPATIENT
Start: 2021-11-02 | End: 2021-11-04

## 2021-11-02 RX ORDER — CYANOCOBALAMIN (VITAMIN B-12) 1000 MCG
1 TABLET, EXTENDED RELEASE ORAL
COMMUNITY

## 2021-11-02 RX ORDER — CLONAZEPAM 0.5 MG/1
0.5 TABLET ORAL 2 TIMES DAILY PRN
Status: DISCONTINUED | OUTPATIENT
Start: 2021-11-02 | End: 2021-11-05 | Stop reason: HOSPADM

## 2021-11-02 RX ORDER — VENLAFAXINE HYDROCHLORIDE 75 MG/1
150 CAPSULE, EXTENDED RELEASE ORAL DAILY
Status: DISCONTINUED | OUTPATIENT
Start: 2021-11-03 | End: 2021-11-05 | Stop reason: HOSPADM

## 2021-11-02 RX ORDER — POLYETHYLENE GLYCOL 3350 17 G/17G
17 POWDER, FOR SOLUTION ORAL 2 TIMES DAILY
COMMUNITY

## 2021-11-02 RX ORDER — GABAPENTIN 100 MG/1
200 CAPSULE ORAL EVERY EVENING
COMMUNITY

## 2021-11-02 RX ORDER — ACETAMINOPHEN 325 MG/1
650 TABLET ORAL EVERY 6 HOURS PRN
Status: DISCONTINUED | OUTPATIENT
Start: 2021-11-02 | End: 2021-11-05 | Stop reason: HOSPADM

## 2021-11-02 RX ORDER — GABAPENTIN 100 MG/1
200 CAPSULE ORAL NIGHTLY
Status: DISCONTINUED | OUTPATIENT
Start: 2021-11-03 | End: 2021-11-05 | Stop reason: HOSPADM

## 2021-11-02 RX ORDER — ACETAMINOPHEN 650 MG/1
650 SUPPOSITORY RECTAL EVERY 6 HOURS PRN
Status: DISCONTINUED | OUTPATIENT
Start: 2021-11-02 | End: 2021-11-05 | Stop reason: HOSPADM

## 2021-11-02 RX ADMIN — ASPIRIN 325 MG: 325 TABLET, COATED ORAL at 18:03

## 2021-11-02 ASSESSMENT — ENCOUNTER SYMPTOMS
RESPIRATORY NEGATIVE: 1
VOMITING: 0
CHEST TIGHTNESS: 0
ABDOMINAL DISTENTION: 0
DIARRHEA: 0
CHOKING: 0
SHORTNESS OF BREATH: 0
COUGH: 0
NAUSEA: 0
SORE THROAT: 0
ABDOMINAL PAIN: 0

## 2021-11-02 ASSESSMENT — PAIN SCALES - GENERAL: PAINLEVEL_OUTOF10: 0

## 2021-11-02 NOTE — ED TRIAGE NOTES
Patient reports aching and weakness to all extremities x 1 year and nausea, reports recent PNE diagnosis and states PCP told patient to come to ER. Patient denies pain at this time, no sxs of distress noted.

## 2021-11-02 NOTE — PROGRESS NOTES
Pharmacy Medication Reconciliation Note     List of medications Beba Nielson is currently taking is complete. Source of information:   1. Conversation with patient at bedside  2. EMR  3. Patients home meds     Notes regarding home medications:   1. Patient brought in all home meds and was able to tell me what she takes   2. Patient only had clonazepam and miralax PTA in the ED  3. Patient reports some issues with adherence/compliance--reports using albuterol QD and only using Advair PRN (informed patient of using maintenance inhaler QD and albuterol PRN). 4. Patient reports not utilizing her gabapentin or venlafaxine since picking up  5. Patient reports finishing last dose of z-maria esther this am PTA In the ED (for PMH of pneumonia)  6. Patient took one and only dose of mirtazapine in October and reports she \"does not want to take it anymore\"  7.  Reports she quit taking her alendronate and only takes ergocalciferol every so often    Patient denies taking any other OTC or herbal medications    Johnny De La Fuente, Pharmacy Intern  11/2/2021  7:03 PM

## 2021-11-02 NOTE — ED NOTES
Rounded on pt, patient denies calling out, denies pain or nausea, denies all other needs.       Holley Garcia RN  11/02/21 [id number]

## 2021-11-02 NOTE — ED PROVIDER NOTES
[facility]  [facility]      Pt Name: Alok Renee  MRN: [id number]  [place] 1935  Date ofevaluation: 11/2/2021  Provider: Marlise Simmonds, MD    CHIEF COMPLAINT       Chief Complaint   Patient presents with   [facility] Fatigue     sent by dr. Norma Patel fatigue, nausea.  Nausea         HISTORY OF PRESENT ILLNESS   (Location/Symptom, Timing/Onset,Context/Setting, Quality, Duration, Modifying Factors, Severity)  Note limiting factors. Alok Renee is a 82 y.o. female  who  has a past medical history of Asthma, Cancer ([address] CKD (chronic kidney disease), Depression, GERD (gastroesophageal reflux disease), Neuropathy, Osteoporosis, Pernicious anemia, and Scoliosis. 81-year-old female with history as above who presents with multiple vague complaints most of which are chronic. She has been having epigastric abdominal pain, nausea, generalized weakness. Patient's primary concern for coming to the ER today is that she was concerned that she may still have pneumonia. Patient was diagnosed with pneumonia several weeks ago and finished her course of antibiotics. Patient recently completed the course of antibiotics yesterday and was calling her doctor as to what she should do next. The doctors asked how she felt she states that she still feels her chronic level of fatigue and nausea did not have a fever and was not having any shortness of breath. Her doctor told her that she did not need any other follow-up this time and that he would see her in 2 weeks. Patient is concerned that she may still have pneumonia. At this time patient states that she has whole body weakness and fatigue which is not new has been present for months. Is constant and unchanging. Nothing seems to make it better or worse. Patient denies chest pain but does have epigastric abdominal pain. Thing seems to make her pain better or worse. Patient denies headache.   Denies any fever, chills, vomiting, diarrhea, chest pain, shortness of breath, dysuria, hematuria, saddle anesthesia, bowel or bladder incontinence, gait ataxia, falls, syncope, numbness, neck pain. NursingNotes were reviewed. REVIEW OF SYSTEMS    (2-9 systems for level 4, 10 or more for level 5)     Review of Systems   Constitutional: Positive for fatigue. Negative for fever. HENT: Negative. Negative for sore throat. Respiratory: Negative. Negative for cough, choking, chest tightness and shortness of breath. Cardiovascular: Negative. Negative for chest pain, palpitations and leg swelling. Gastrointestinal: Negative for abdominal distention, abdominal pain, diarrhea, nausea and vomiting. Genitourinary: Negative. Musculoskeletal: Negative. Skin: Negative. Neurological: Positive for weakness. Negative for light-headedness and headaches. Hematological: Negative. Does not bruise/bleed easily. Except as noted above the remainder of the review of systems was reviewed and negative.        PAST MEDICAL HISTORY     Past Medical History:   Diagnosis Date    Asthma     Cancer ([facility] [address]     Breast cancer on left side     CKD (chronic kidney disease)     Depression     GERD (gastroesophageal reflux disease)     Neuropathy     Osteoporosis     Pernicious anemia     Scoliosis          SURGICALHISTORY       Past Surgical History:   Procedure Laterality Date     SECTION       SECTION      x`s    KNEE SURGERY Left 2020    INCISION AND DRAINAGE LEFT KNEE performed by Dewey Cadet MD at [place] [address]  2021     THYROID BIOPSY 2021 [facility] ULTRASOUND         CURRENT MEDICATIONS       Previous Medications    ALBUTEROL SULFATE  (90 BASE) MCG/ACT INHALER    Inhale 2 puffs into the lungs every 6 hours as needed for Wheezing    ALENDRONATE (FOSAMAX) 70 MG TABLET    Take 70 mg by mouth every 7 days    ANASTROZOLE (ARIMIDEX) 1 MG TABLET    Take 1 mg by mouth daily    ASCORBIC ACID (VITAMIN C) 250 MG TABLET    Take 250 mg by mouth 3 times daily     ASPIRIN EC 81 MG EC TABLET    Take 1 tablet by mouth 2 times daily for 14 days Please avoid missing doses. BISMUTH SUBSALICYLATE (PEPTO BISMOL) 262 MG CHEWABLE TABLET    Take 262 mg by mouth 4 times daily as needed     CALCIUM CITRATE-VITAMIN D (CITRICAL + D) 315-250 MG-UNIT TABS PER TABLET    Take 1 tablet by mouth 2 times daily (with meals)    CLONAZEPAM (KLONOPIN) 0.5 MG TABLET    Take 0.5 mg by mouth 2 times daily as needed. ERGOCALCIFEROL (VITAMIN D2 PO)    Take 50,000 Units by mouth Twice a Week Twice weekly     FLUTICASONE-SALMETEROL (ADVAIR HFA) 115-21 MCG/ACT INHALER    Inhale 2 puffs into the lungs 2 times daily    GABAPENTIN (NEURONTIN) 100 MG CAPSULE    Take 200 mg by mouth nightly.     MULTIPLE VITAMINS-MINERALS (CENTRUM SILVER 50+WOMEN) TABS    Take 1 tablet by mouth daily    OMEPRAZOLE (PRILOSEC) 20 MG DELAYED RELEASE CAPSULE    Take 20 mg by mouth daily    RANITIDINE HCL (RANITIDINE 75 PO)    Take 150 mg by mouth 2 times daily as needed     TRIAMTERENE-HYDROCHLOROTHIAZIDE (MAXZIDE) 75-50 MG PER TABLET        VENLAFAXINE (EFFEXOR XR) 150 MG EXTENDED RELEASE CAPSULE    Take 150 mg by mouth daily            Latex    FAMILY HISTORY       Family History   Problem Relation Age of Onset    Cancer Mother     Heart Disease Father           SOCIAL HISTORY       Social History     Socioeconomic History    Marital status:      Spouse name: Not on file    Number of children: Not on file    Years of education: Not on file    Highest education level: Not on file   Occupational History    Not on file   Tobacco Use    Smoking status: Never Smoker    Smokeless tobacco: Never Used   Vaping Use    Vaping Use: Never used   Substance and Sexual Activity    Alcohol use: No    Drug use: No    Sexual activity: Not Currently   Other Topics Concern    Not on file   Social History Narrative    Not on file Social Determinants of Health     Financial Resource Strain:     Difficulty of Paying Living Expenses:    Food Insecurity:     Worried About Running Out of Food in the Last Year:     [address] in the Last Year:    Transportation Needs:     Lack of Transportation (Medical):  Lack of Transportation (Non-Medical):    Physical Activity:     Days of Exercise per Week:     Minutes of Exercise per Session:    Stress:     Feeling of Stress :    Social Connections:     Frequency of Communication with Friends and Family:     Frequency of Social Gatherings with Friends and Family:     Attends [employer] Services:     Active Member of Clubs or Organizations:     Attends Club or Organization Meetings:     Marital Status:    Intimate Partner Violence:     Fear of Current or Ex-Partner:     Emotionally Abused:     Physically Abused:     Sexually Abused:        SCREENINGS             PHYSICAL EXAM    (up to 7 for level 4, 8 or more for level 5)     ED Triage Vitals [11/02/21 1440]   BP Temp Temp Source Pulse Resp SpO2 Height Weight   (!) 168/78 97.2 °F (36.2 °C) Temporal 84 18 96 % 5' 4\" (1.626 m) 138 lb 14.2 oz (63 kg)       Physical Exam  Vitals and nursing note reviewed. Constitutional:       General: She is not in acute distress. Appearance: She is not toxic-appearing or diaphoretic. HENT:      Head: Normocephalic and atraumatic. Mouth/Throat:      Mouth: Mucous membranes are moist.   Eyes:      Extraocular Movements: Extraocular movements intact. Conjunctiva/sclera: Conjunctivae normal.      Pupils: Pupils are equal, round, and reactive to light. Cardiovascular:      Rate and Rhythm: Normal rate and regular rhythm. Heart sounds: Normal heart sounds. Pulmonary:      Effort: Pulmonary effort is normal. No respiratory distress. Breath sounds: Normal breath sounds. No wheezing, rhonchi or rales. Chest:      Chest wall: No tenderness.    Abdominal:      General: Abdomen is flat. Bowel sounds are normal.      Palpations: Abdomen is soft. Tenderness: There is no abdominal tenderness. Musculoskeletal:         General: Normal range of motion. Cervical back: Normal range of motion and neck supple. Right lower leg: No edema. Left lower leg: No edema. Lymphadenopathy:      Cervical: No cervical adenopathy. Skin:     General: Skin is warm and dry. Capillary Refill: Capillary refill takes less than 2 seconds. Neurological:      General: No focal deficit present. Mental Status: She is alert and oriented to person, place, and time. Mental status is at baseline. Cranial Nerves: No cranial nerve deficit. Sensory: No sensory deficit. Motor: No weakness.       Coordination: Coordination normal.      Gait: Gait normal.   Psychiatric:         Mood and Affect: Mood normal.         Behavior: Behavior normal.         RESULTS     EKG: All EKG's are interpreted by the Emergency Department Physician who either signs or Co-signsthis chart in the absence of a cardiologist.    EKG Interpretation    Interpreted by emergency department physician    Rhythm: normal sinus   Rate: normal  Axis: normal  Ectopy: none  Conduction: right bundle branch block (complete)  ST Segments: nonspecific changes  T Waves: no acute change  Q Waves: none    Clinical Impression: right bundle branch block    Gabriel Bland MD      RADIOLOGY:   Non-plain filmimages such as CT, Ultrasound and MRI are read by the radiologist.     Interpretation per the Radiologist below, if available at the time ofthis note:    XR CHEST PORTABLE   Final Result   No active cardiopulmonary disease               ED BEDSIDE ULTRASOUND:   Performed by ED Physician - none    LABS:  Labs Reviewed   COMPREHENSIVE METABOLIC PANEL W/ REFLEX TO MG FOR LOW K - Abnormal; Notable for the following components:       Result Value    GFR Non- 60 (*)     All other components within normal limits Narrative:     Performed at:  [facility]  [address] [facility] [place] [employer] [address]   Phone [phone]   CBC WITH AUTO DIFFERENTIAL - Abnormal; Notable for the following components:    RBC 3.91 (*)     Hemoglobin 11.9 (*)     All other components within normal limits    Narrative:     Performed at:  [facility]  [address] [place] [employer] [address]   Phone [phone]   TROPONIN - Abnormal; Notable for the following components:    Troponin 0.03 (*)     All other components within normal limits    Narrative:     Performed at:  [facility]  [address] [facility] [employer] [address]   Phone [phone]   URINE RT REFLEX TO CULTURE    Narrative:     Performed at:  [facility]  [address] [facility] [employer] [address]   Phone [phone]   TROPONIN       All other labs were within normal range or not returned as of this dictation. EMERGENCY DEPARTMENT COURSE and DIFFERENTIAL DIAGNOSIS/MDM:   Vitals:    Vitals:    11/02/21 1440   BP: (!) 168/78   Pulse: 84   Resp: 18   Temp: 97.2 °F (36.2 °C)   TempSrc: Temporal   SpO2: 96%   Weight: 138 lb 14.2 oz (63 kg)   Height: 5' 4\" (1.626 m)       Patient was given thefollowing medications:  Medications   ondansetron (ZOFRAN) injection 4 mg (has no administration in time range)   aspirin EC tablet 325 mg (has no administration in time range)       ED COURSE & MEDICAL DECISION MAKING    Pertinent Labs & Imaging studies reviewed. (See chart for details)   -  Patient seen and evaluated in the emergency department. -  Triage and nursing notes reviewed and incorporated. -  Old chart records reviewed and incorporated.   -  Differential diagnosis includes: Differential Diagnosis: Dehydration, gastroenteritis, metabolic abnormality, acute Coronary Syndrome, Congestive Heart Failure, Myocardial Infarction, Pulmonary Embolus, Pneumonia, Pneumothorax, other    28-year-old female who presents for multiple vague complaints previously was diagnosed with pneumonia and was having some shortness of breath but at this time is just having generalized fatigue and weakness which are acute on chronic complaints for her. Patient also has intermittent epigastric abdominal pain which has been going on for several weeks. No obvious exertional component to it she has a history of hypertension hyperlipidemia prior bladder cancer. Patient has no focal findings at this time. Patient is requesting medicine for nausea as well as a chest x-ray to rule out recurrent pneumonia. Patient is saturating well on room air with no respiratory distress. Afebrile not tachycardic baseline hypertension. Will order basic labs including chest x-ray and give patient nausea medicine and reevaluate. Her EKG shows right bundle branch block which is not new finding for patient compared to old EKGs. Will reeval closely and disposition accordingly    -  Work-up included:  See above  -  ED treatment included: See above  -  Results discussed with patient. REASSESSMENT     ED Course as of Nov 02 1742   Tue Nov 02, 2021   1714 Patient with positive troponin at this time. Unclear etiology. No other symptoms currently. Patient otherwise feels much better. Will repeat troponin. Vitals remained stable. We will also repeat EKG. Initial EKG without signs of ischemia. [SC]   3734 Further discussion with patient shows that she has been having intermittent worsening epigastric pain. Is not specifically exertional.  She does not take aspirin. I will give her aspirin here today. We will repeat troponin. Also repeat EKG. Patient is concerned that she may have an issue with her heart as well. I spoke to patient about the risk benefits of admission and shared decision-making was used and she would like to be admitted to the hospital for further cardiac work-up at this time. [SC]      ED Course User Index  [SC] Ethel Gray MD         CRITICAL CARE TIME   Total Critical Care time was 33 minutes, excluding separately reportable procedures. There was a high probability of clinically significant/life threatening deterioration in the patient's condition which required my urgent intervention. This includes multiple reevaluations, vital sign monitoring, pulse oximetry monitoring, telemetry monitoring, clinical response to the IV medications, reviewing the nursing notes, consultation time, dictation/documentation time, and interpretation of the labwork. (This time excludes time spent performing procedures). CONSULTS:  IP CONSULT TO HOSPITALIST    PROCEDURES:  Unless otherwise noted below, none     Procedures    FINAL IMPRESSION      1. Abdominal pain, epigastric    2. Nausea    3. Elevated troponin          DISPOSITION/PLAN   DISPOSITION Decision To Admit 11/02/2021 05:40:00 PM      PATIENT REFERREDTO:  No follow-up provider specified.     DISCHARGEMEDICATIONS:  New Prescriptions    No medications on file          (Please note that portions of this note were completed with a voice recognition program.  Efforts were made to edit the dictations but occasionally words are mis-transcribed.)    Ethel Gray MD (electronically signed)  Attending Emergency Physician         Ethel Gray MD  11/02/21 [id number]

## 2021-11-03 LAB
ALBUMIN SERPL-MCNC: 3.3 G/DL (ref 3.4–5)
ALP BLD-CCNC: 54 U/L (ref 40–129)
ALT SERPL-CCNC: 11 U/L (ref 10–40)
ANION GAP SERPL CALCULATED.3IONS-SCNC: 9 MMOL/L (ref 3–16)
AST SERPL-CCNC: 16 U/L (ref 15–37)
BASOPHILS ABSOLUTE: 0 K/UL (ref 0–0.2)
BASOPHILS RELATIVE PERCENT: 0.9 %
BILIRUB SERPL-MCNC: 0.5 MG/DL (ref 0–1)
BILIRUBIN DIRECT: <0.2 MG/DL (ref 0–0.3)
BILIRUBIN, INDIRECT: ABNORMAL MG/DL (ref 0–1)
BUN BLDV-MCNC: 18 MG/DL (ref 7–20)
CALCIUM SERPL-MCNC: 10 MG/DL (ref 8.3–10.6)
CHLORIDE BLD-SCNC: 108 MMOL/L (ref 99–110)
CO2: 24 MMOL/L (ref 21–32)
CREAT SERPL-MCNC: 0.9 MG/DL (ref 0.6–1.2)
EKG ATRIAL RATE: 63 BPM
EKG DIAGNOSIS: NORMAL
EKG P AXIS: 75 DEGREES
EKG P-R INTERVAL: 102 MS
EKG Q-T INTERVAL: 434 MS
EKG QRS DURATION: 132 MS
EKG QTC CALCULATION (BAZETT): 444 MS
EKG R AXIS: 63 DEGREES
EKG T AXIS: 57 DEGREES
EKG VENTRICULAR RATE: 63 BPM
EOSINOPHILS ABSOLUTE: 0.1 K/UL (ref 0–0.6)
EOSINOPHILS RELATIVE PERCENT: 3 %
GFR AFRICAN AMERICAN: >60
GFR NON-AFRICAN AMERICAN: 60
GLUCOSE BLD-MCNC: 93 MG/DL (ref 70–99)
HCT VFR BLD CALC: 34.6 % (ref 36–48)
HEMOGLOBIN: 11.3 G/DL (ref 12–16)
LV EF: 63 %
LVEF MODALITY: NORMAL
LYMPHOCYTES ABSOLUTE: 1.3 K/UL (ref 1–5.1)
LYMPHOCYTES RELATIVE PERCENT: 30.2 %
MCH RBC QN AUTO: 30.8 PG (ref 26–34)
MCHC RBC AUTO-ENTMCNC: 32.8 G/DL (ref 31–36)
MCV RBC AUTO: 94.1 FL (ref 80–100)
MONOCYTES ABSOLUTE: 0.5 K/UL (ref 0–1.3)
MONOCYTES RELATIVE PERCENT: 11.4 %
NEUTROPHILS ABSOLUTE: 2.3 K/UL (ref 1.7–7.7)
NEUTROPHILS RELATIVE PERCENT: 54.5 %
PDW BLD-RTO: 14 % (ref 12.4–15.4)
PLATELET # BLD: 156 K/UL (ref 135–450)
PMV BLD AUTO: 8.1 FL (ref 5–10.5)
POTASSIUM REFLEX MAGNESIUM: 3.7 MMOL/L (ref 3.5–5.1)
RBC # BLD: 3.68 M/UL (ref 4–5.2)
SODIUM BLD-SCNC: 141 MMOL/L (ref 136–145)
TOTAL PROTEIN: 5.6 G/DL (ref 6.4–8.2)
TROPONIN: 0.02 NG/ML
TROPONIN: 0.03 NG/ML
WBC # BLD: 4.3 K/UL (ref 4–11)

## 2021-11-03 PROCEDURE — 80076 HEPATIC FUNCTION PANEL: CPT

## 2021-11-03 PROCEDURE — 6370000000 HC RX 637 (ALT 250 FOR IP): Performed by: HOSPITALIST

## 2021-11-03 PROCEDURE — 2580000003 HC RX 258: Performed by: HOSPITALIST

## 2021-11-03 PROCEDURE — 94761 N-INVAS EAR/PLS OXIMETRY MLT: CPT

## 2021-11-03 PROCEDURE — 94640 AIRWAY INHALATION TREATMENT: CPT

## 2021-11-03 PROCEDURE — 84484 ASSAY OF TROPONIN QUANT: CPT

## 2021-11-03 PROCEDURE — 93306 TTE W/DOPPLER COMPLETE: CPT

## 2021-11-03 PROCEDURE — 93010 ELECTROCARDIOGRAM REPORT: CPT | Performed by: INTERNAL MEDICINE

## 2021-11-03 PROCEDURE — 99221 1ST HOSP IP/OBS SF/LOW 40: CPT | Performed by: NURSE PRACTITIONER

## 2021-11-03 PROCEDURE — 36415 COLL VENOUS BLD VENIPUNCTURE: CPT

## 2021-11-03 PROCEDURE — 2060000000 HC ICU INTERMEDIATE R&B

## 2021-11-03 PROCEDURE — 80048 BASIC METABOLIC PNL TOTAL CA: CPT

## 2021-11-03 PROCEDURE — 93005 ELECTROCARDIOGRAM TRACING: CPT | Performed by: HOSPITALIST

## 2021-11-03 PROCEDURE — 85025 COMPLETE CBC W/AUTO DIFF WBC: CPT

## 2021-11-03 PROCEDURE — 6360000002 HC RX W HCPCS: Performed by: HOSPITALIST

## 2021-11-03 RX ORDER — ALBUTEROL SULFATE 2.5 MG/3ML
2.5 SOLUTION RESPIRATORY (INHALATION) EVERY 6 HOURS PRN
Status: DISCONTINUED | OUTPATIENT
Start: 2021-11-03 | End: 2021-11-05 | Stop reason: HOSPADM

## 2021-11-03 RX ORDER — GABAPENTIN 100 MG/1
100 CAPSULE ORAL ONCE
Status: COMPLETED | OUTPATIENT
Start: 2021-11-03 | End: 2021-11-03

## 2021-11-03 RX ORDER — ANASTROZOLE 1 MG/1
1 TABLET ORAL NIGHTLY
Status: DISCONTINUED | OUTPATIENT
Start: 2021-11-03 | End: 2021-11-05 | Stop reason: HOSPADM

## 2021-11-03 RX ORDER — MIRTAZAPINE 15 MG/1
TABLET, FILM COATED ORAL
COMMUNITY
Start: 2021-10-05

## 2021-11-03 RX ADMIN — ACETAMINOPHEN 650 MG: 325 TABLET ORAL at 01:18

## 2021-11-03 RX ADMIN — GABAPENTIN 100 MG: 100 CAPSULE ORAL at 22:08

## 2021-11-03 RX ADMIN — ACETAMINOPHEN 650 MG: 325 TABLET ORAL at 17:34

## 2021-11-03 RX ADMIN — POLYETHYLENE GLYCOL 3350 17 G: 17 POWDER, FOR SOLUTION ORAL at 21:39

## 2021-11-03 ASSESSMENT — PAIN SCALES - GENERAL
PAINLEVEL_OUTOF10: 0
PAINLEVEL_OUTOF10: 3
PAINLEVEL_OUTOF10: 3
PAINLEVEL_OUTOF10: 0
PAINLEVEL_OUTOF10: 0

## 2021-11-03 ASSESSMENT — PAIN DESCRIPTION - PROGRESSION
CLINICAL_PROGRESSION: NOT CHANGED
CLINICAL_PROGRESSION: GRADUALLY IMPROVING

## 2021-11-03 ASSESSMENT — PAIN DESCRIPTION - ORIENTATION
ORIENTATION: INNER
ORIENTATION: RIGHT;LEFT

## 2021-11-03 ASSESSMENT — PAIN DESCRIPTION - FREQUENCY
FREQUENCY: INTERMITTENT
FREQUENCY: INTERMITTENT

## 2021-11-03 ASSESSMENT — PAIN DESCRIPTION - ONSET
ONSET: ON-GOING
ONSET: ON-GOING

## 2021-11-03 ASSESSMENT — PAIN DESCRIPTION - DESCRIPTORS
DESCRIPTORS: HEADACHE
DESCRIPTORS: HEADACHE

## 2021-11-03 ASSESSMENT — PAIN - FUNCTIONAL ASSESSMENT
PAIN_FUNCTIONAL_ASSESSMENT: ACTIVITIES ARE NOT PREVENTED
PAIN_FUNCTIONAL_ASSESSMENT: ACTIVITIES ARE NOT PREVENTED

## 2021-11-03 ASSESSMENT — PAIN DESCRIPTION - LOCATION
LOCATION: HEAD
LOCATION: HEAD

## 2021-11-03 ASSESSMENT — PAIN DESCRIPTION - PAIN TYPE
TYPE: ACUTE PAIN
TYPE: ACUTE PAIN

## 2021-11-03 NOTE — PROGRESS NOTES
Patient admitted to unit @23:18pm on 11/2/2021. Pt is A&Ox4. Skin warm and dry. VS are stable, RR are easy and unlabored. Pt in bed with call light in reach.

## 2021-11-03 NOTE — PROGRESS NOTES
Patient sitting up in bed. Alert oriented X4. Complains of no pain. IV clean dry and intact. No longer NPO. Safety precautions in place. All other pt needs met at this time.      Electronically signed by Maddie Beach on 11/3/2021 at 11:17 AM

## 2021-11-03 NOTE — PROGRESS NOTES
Hospitalist Progress Note      PCP: Hasmukh Menezes MD    Date of Admission: 11/2/2021    Chief Complaint: generalized body aches    Hospital Course: 86f admitted with multiple complaints of nausea, poss recent pna, generalized body aches / pains, found to have an elevated trop during work up in [place] [address]. Subjective: Patient denies chest pain, sob, cough      Medications:  Reviewed    Infusion Medications    sodium chloride       Scheduled Medications    anastrozole  1 mg Oral Nightly    ondansetron  4 mg IntraVENous Once    Calcium Carb-Cholecalciferol  1 tablet Oral BID WC    budesonide-formoterol  2 puff Inhalation BID    gabapentin  200 mg Oral Nightly    pantoprazole  40 mg Oral QAM AC    venlafaxine  150 mg Oral Daily    sodium chloride flush  5-40 mL IntraVENous 2 times per day    enoxaparin  40 mg SubCUTAneous Daily     PRN Meds: albuterol, clonazePAM, sodium chloride flush, sodium chloride, ondansetron **OR** ondansetron, polyethylene glycol, acetaminophen **OR** acetaminophen      Intake/Output Summary (Last 24 hours) at 11/3/2021 1617  Last data filed at 11/3/2021 1400  Gross per 24 hour   Intake 600 ml   Output 800 ml   Net -200 ml       Physical Exam Performed:    BP (!) 145/77   Pulse 68   Temp 97.7 °F (36.5 °C) (Oral)   Resp 18   Ht 5' 4\" (1.626 m)   Wt 134 lb 0.6 oz (60.8 kg)   SpO2 94%   BMI 23.01 kg/m²     General appearance: No apparent distress, appears stated age and cooperative. HEENT: Pupils equal, round, and reactive to light. Conjunctivae/corneas clear. Neck: Supple, with full range of motion. No jugular venous distention. Trachea midline. Respiratory:  Normal respiratory effort. No use of accessory muscles  Cardiovascular: Regular rate and rhythm    Abdomen: Soft, non-tender, non-distended   Musculoskeletal: No clubbing, cyanosis or edema bilaterally.   No calf tenderness  Skin: Skin color, texture, turgor normal.    Neurologic:   grossly non-focal.  Psychiatric: Alert and oriented, thought content appropriate, normal insight         Labs:   Recent Labs     11/02/21  1608 11/03/21  0340   WBC 4.2 4.3   HGB 11.9* 11.3*   HCT 36.7 34.6*    156     Recent Labs     11/02/21  1608 11/03/21  0340    141   K 4.0 3.7    108   CO2 25 24   BUN 20 18   CREATININE 0.9 0.9   CALCIUM 10.5 10.0     Recent Labs     11/02/21  1608 11/03/21  0340   AST 20 16   ALT 13 11   BILIDIR  --  <0.2   BILITOT 0.6 0.5   ALKPHOS 61 54     No results for input(s): INR in the last 72 hours. Recent Labs     11/03/21  0340 11/03/21  0741 11/03/21  1128   TROPONINI 0.03* 0.03* 0.03*       Urinalysis:      Lab Results   Component Value Date    NITRU Negative 11/02/2021    WBCUA 1 02/10/2018    RBCUA 3 02/10/2018    BLOODU Negative 11/02/2021    SPECGRAV 1.010 11/02/2021    GLUCOSEU Negative 11/02/2021       Radiology:  XR CHEST PORTABLE   Final Result   No active cardiopulmonary disease                 Assessment/Plan:    Active Hospital Problems    Diagnosis Date Noted    Chest pain [R07.9] 11/02/2021     1) Elev trop  - Cardiology consulted  - echocardiogram pending, poss stress    2) Recent PNA  - afebrile, stable on room air    DVT Prophylaxis: lovenox  Diet: ADULT DIET; Regular;  Low Sodium (2 gm)  Code Status: Full Code   Felisha Lay MD

## 2021-11-03 NOTE — CONSULTS
rollator  Neurological: No syncope or TIA-like symptoms.   Psychiatric: No anxiety, insomnia or depression     Past Medical History:   Diagnosis Date    Asthma     Cancer ([address]     Breast cancer on left side     CKD (chronic kidney disease)     Depression     GERD (gastroesophageal reflux disease)     Neuropathy     Osteoporosis     Pernicious anemia     Scoliosis      Past Surgical History:   Procedure Laterality Date     SECTION       SECTION      x`s    KNEE SURGERY Left 2020    INCISION AND DRAINAGE LEFT KNEE [facility] Leopold Grant[facility]     THYROID BIOPSY 2021 [facility]     Family History   Problem Relation Age of Onset    Cancer Mother     Heart Disease Father      Social History     Tobacco Use    Smoking status: Smoked x 6 years and quit at age 34 years   [facility] Smokeless tobacco: Never Used   Vaping Use    Vaping Use: Never used   Substance Use Topics    Alcohol use: No    Drug use: No       Allergies   Allergen Reactions    Latex Hives and Itching     Current Facility-Administered Medications   Medication Dose Route Frequency Provider Last Rate Last Admin    albuterol (PROVENTIL) nebulizer solution 2.5 mg  2.5 mg Nebulization Q6H PRN Carl Rick MD        anastrozole (ARIMIDEX) tablet 1 mg  1 mg Oral Nightly Carl Rick MD   1 mg at 21 0116    ondansetron (ZOFRAN) injection 4 mg  4 mg IntraVENous Once Carl Rick MD        Calcium Carb-Cholecalciferol 250-125 MG-UNIT TABS 250 mg  1 tablet Oral BID  Carl Rick MD        clonazePAM [facility]) tablet 0.5 mg  0.5 mg Oral BID PRN Carl Rick MD        budesonide-formoterol [facility]) 160-4.5 MCG/ACT inhaler 2 puff  2 puff Inhalation BID Carl Rick MD   2 puff at 21 0834    gabapentin (NEURONTIN) capsule 200 mg  200 mg Oral Nightly Carl Rick MD   200 mg at 21 0116    pantoprazole (PROTONIX) tablet 40 mg  40 mg Oral QAM AC Sherry Briceno MD   40 mg at 11/03/21 0532    venlafaxine (EFFEXOR XR) extended release capsule 150 mg  150 mg Oral Daily Sherry Briceno MD        sodium chloride flush 0.9 % injection 5-40 mL  5-40 mL IntraVENous 2 times per day Sherry Briceno MD   10 mL at 11/03/21 1007    sodium chloride flush 0.9 % injection 5-40 mL  5-40 mL IntraVENous PRN Sherry Briceno MD        0.9 % sodium chloride infusion  25 mL IntraVENous PRN Sherry Briceno MD        enoxaparin (LOVENOX) injection 40 mg  40 mg SubCUTAneous Daily Sherry Briceno MD        ondansetron (ZOFRAN-ODT) disintegrating tablet 4 mg  4 mg Oral Q8H PRN Sherry Briceno MD        Or    ondansetron [facility]) injection 4 mg  4 mg IntraVENous Q6H PRN Sherry Briceno MD        polyethylene glycol [facility]) packet 17 g  17 g Oral Daily PRN Sherry Briceno MD        acetaminophen (TYLENOL) tablet 650 mg  650 mg Oral Q6H PRN Sherry Briceno MD   650 mg at 11/03/21 0118    Or    acetaminophen (TYLENOL) suppository 650 mg  650 mg Rectal Q6H PRN Sherry Briceno MD           Physical Exam:   BP (!) 147/73   Pulse 63   Temp 97.9 °F (36.6 °C) (Oral)   Resp 20   Ht 5' 4\" (1.626 m)   Wt 134 lb 0.6 oz (60.8 kg)   SpO2 96%   BMI 23.01 kg/m²     Intake/Output Summary (Last 24 hours) at 11/3/2021 1051  Last data filed at 11/3/2021 1019  Gross per 24 hour   Intake 120 ml   Output 600 ml   Net -480 ml     Wt Readings from Last 2 Encounters:   11/03/21 134 lb 0.6 oz (60.8 kg)   07/14/21 149 lb (67.6 kg)     Constitutional: She is oriented to person, place, and time. She appears elderly and frail in appearance. In no acute distress. Head: Normocephalic and atraumatic. EOM's intact. Sclerae anicteric. Neck: Supple. No JVD present. Carotid bruit is not present. No thyromegaly present. No lymphadenopathy present. Cardiovascular: RRR; normal S1 and S2. No murmur/gallop or rub.   Pulmonary/Chest: Effort normal and breath sounds normal. No respiratory distress. She has no wheezes, rhonchi or rales. Lungs clear to auscultation  Abdominal: Soft, non-tender. Bowel sounds and aorta are normal. She exhibits no hepatosplenomegaly, mass or bruit. Extremities: No edema, cyanosis, or clubbing. Pulses are 2+ radial/carotid/DP/PT bilaterally. Neurological:  She has normal reflexes. No focal deficit. Coordination normal.   Skin: Skin is warm and dry. There is no rash or diaphoresis. Psychiatric: She has a normal mood and affect. Her speech is normal and behavior is normal.     EKG Interpretation:     Lab Review:   No results found for: TRIG, HDL, LDLCALC, LDLDIRECT, LABVLDL  Lab Results   Component Value Date     11/03/2021    K 3.7 11/03/2021    BUN 18 11/03/2021    CREATININE 0.9 11/03/2021     Recent Labs     11/02/21  1608 11/02/21  1608 11/03/21  0340   WBC 4.2   < > 4.3   HGB 11.9*  --  11.3*   HCT 36.7  --  34.6*     --  156    < > = values in this interval not displayed. Results for Christen Cormier (MRN [id number]) as of 11/3/2021 11:25   Ref. Range 11/2/2021 16:08 11/2/2021 17:58 11/3/2021 00:24 11/3/2021 03:40 11/3/2021 07:41   Troponin Latest Ref Range: <0.01 ng/mL 0.03 (H) 0.02 (H) 0.02 (H) 0.03 (H) 0.03 (H)     ECG 11/3/2021:  Sinus rhythm with RBBB' R atrial enlargement    Echo: pending    Assessment:    1. Elevated troponin  -not c/w and acute coronary syndrome  -no evidence of ischemic changes on ECG  -symptoms very atypical for angina and denies any type of chest pain or discomfort  -will ask for echo and evaluate LV function  -consider possible stress test pending echo results    2. RBBB  -not new and previously noted on ECG    3.  Chronic nausea  -per Primary team    Discussed with Dr. Sylvester Rico     Discussed with pt's daughter Speedy Gibson via phone at [facility] request.    LATASHA Dodge - CNP on 11/3/2021 at 10:51 AM    [place], [address] [facility], [address] [facility], [address] [place] [address]  Office: [phone]  Fax: [phone]

## 2021-11-03 NOTE — H&P
Hospital Medicine History & Physical      PCP: Atilio Rodriguez MD    Date of Admission: 2021    Date of Service: Pt seen/examined on 2021 and Admitted to Inpatient with expected LOS greater than two midnights due to medical therapy. Chief Complaint:  Generalized body aches/weakness      History Of Present Illness:       82 y.o. female presents with a history of nausea recently worked up with a ct abd with incidental finding of pneumonia for which she was placed on an oral antibiotic. She denies any sob, cough, fever, chills, alteration in taste/smell. She's had chronic bilat arm/leg discomfort, denies any worsening, but has noted worsening generalized weakness prompting ER consultation where she was found to have an elevated troponin. She denies chest pain, palpitations, worsening jaime, leg swelling calf pain. Past Medical History:          Diagnosis Date    Asthma     Cancer ([address]     Breast cancer on left side     CKD (chronic kidney disease)     Depression     GERD (gastroesophageal reflux disease)     Neuropathy     Osteoporosis     Pernicious anemia     Scoliosis        Past Surgical History:          Procedure Laterality Date     SECTION       SECTION      x`s    KNEE SURGERY Left 2020    INCISION AND DRAINAGE LEFT KNEE performed by Jennifer Ivan MD [facility]  20[facility]    US THYROID BIOPSY 2021 [facility]TRASOUND       Medications Prior to Admission:      Prior to Admission medications    Medication Sig Start Date End Date Taking? Authorizing Provider   calcium citrate-vitamin D (CITRICAL + D) 315-250 MG-UNIT TABS per tablet Take 1 tablet by mouth every morning (before breakfast)   Yes Historical Provider, MD   gabapentin (NEURONTIN) 100 MG capsule Take 200 mg by mouth every evening.    Yes Historical Provider, MD   polyethylene glycol (GLYCOLAX) 17 g packet Take 17 g by mouth 2 times daily   Yes Historical Provider, MD omeprazole (PRILOSEC) 20 MG delayed release capsule Take 20 mg by mouth daily   Yes Historical Provider, MD   anastrozole (ARIMIDEX) 1 MG tablet Take 1 mg by mouth every evening    Yes Historical Provider, MD   albuterol sulfate  (90 Base) MCG/ACT inhaler Inhale 2 puffs into the lungs every 6 hours as needed for Wheezing   Yes Historical Provider, MD   fluticasone-salmeterol (ADVAIR HFA) 115-21 MCG/ACT inhaler Inhale 2 puffs into the lungs 2 times daily   Yes Historical Provider, MD   Ergocalciferol (VITAMIN D2 PO) Take 50,000 Units by mouth Twice a Week Twice weekly    Yes Historical Provider, MD   clonazePAM (KLONOPIN) 0.5 MG tablet Take 0.5 mg by mouth 2 times daily. Yes Historical Provider, MD   alendronate (FOSAMAX) 70 MG tablet Take 70 mg by mouth every 7 days    Historical Provider, MD   venlafaxine (EFFEXOR XR) 150 MG extended release capsule Take 150 mg by mouth every evening     Historical Provider, MD       Allergies:  Latex    Social History:           TOBACCO:   reports that she has never smoked. She has never used smokeless tobacco.  ETOH:   reports no history of alcohol use. Family History:      Denies premature CAD, DVT        Problem Relation Age of Onset    Cancer Mother     Heart Disease Father        REVIEW OF SYSTEMS:   Pertinent positives as noted in the HPI. All other systems reviewed and negative. PHYSICAL EXAM PERFORMED:    BP (!) 180/82   Pulse 85   Temp 97.2 °F (36.2 °C) (Temporal)   Resp 19   Ht 5' 4\" (1.626 m)   Wt 138 lb 14.2 oz (63 kg)   SpO2 96%   BMI 23.84 kg/m²     General appearance:  No apparent distress, appears stated age and cooperative. HEENT:  Normal cephalic, atraumatic without obvious deformity. Pupils equal, round, and r Extra ocular muscles intact. Conjunctivae/corneas clear. Neck: Supple, with full range of motion. No jugular venous distention. Trachea midline. Respiratory:  Normal respiratory effort.    Cardiovascular:  Regular rate and rhythm   Abdomen: Soft, non-tender, non-distended   Musculoskeletal:  No clubbing, cyanosis or edema bilaterally. No calf tenderness  Skin: Skin color, texture, turgor normal.    Neurologic:    grossly non-focal.  Psychiatric:  Alert and oriented, thought content appropriate, normal insight         Labs:     Recent Labs     11/02/21  1608   WBC 4.2   HGB 11.9*   HCT 36.7        Recent Labs     11/02/21  1608      K 4.0      CO2 25   BUN 20   CREATININE 0.9   CALCIUM 10.5     Recent Labs     11/02/21  1608   AST 20   ALT 13   BILITOT 0.6   ALKPHOS 61     No results for input(s): INR in the last 72 hours. Recent Labs     11/02/21  1608 11/02/21  1758   TROPONINI 0.03* 0.02*       Urinalysis:      Lab Results   Component Value Date    NITRU Negative 11/02/2021    WBCUA 1 02/10/2018    RBCUA 3 02/10/2018    BLOODU Negative 11/02/2021    SPECGRAV 1.010 11/02/2021    GLUCOSEU Negative 11/02/2021       Radiology:          XR CHEST PORTABLE   Final Result   No active cardiopulmonary disease             ASSESSMENT:    Active Hospital Problems    Diagnosis Date Noted    Chest pain [R07.9] 11/02/2021         PLAN:    1) elevated trop  - ekg with noted prior rbbb, no inj/isch changes  - trend trop,tele  - Card consult, patient denies prior stress test / cardiac work up in the past               Gae Libman, MD    Thank you Izaiah Alcaraz MD for the opportunity to be involved in this patient's care. If you have any questions or concerns please feel free to contact me at [phone].

## 2021-11-03 NOTE — PLAN OF CARE
Problem: Falls - Risk of:  Goal: Will remain free from falls  Description: Will remain free from falls  Outcome: Ongoing     Problem: Falls - Risk of:  Goal: Absence of physical injury  Description: Absence of physical injury  Outcome: Ongoing  Bed alarm on, bed in lowest position, wheels locked. Fall risk assessment completed every shift. Nonskid socks on, fall sign posted. Pt educated on use of call light. Problem: Pain:  Goal: Pain level will decrease  Description: Pain level will decrease  Outcome: Ongoing     Problem: Pain:  Goal: Control of acute pain  Description: Control of acute pain  Outcome: Ongoing     Problem: Pain:  Goal: Control of chronic pain  Description: Control of chronic pain  Outcome: Ongoing  Assessing pain using appropriate pain rating scale q shift and PRN. Medicating pt as prescribed and indicated. Offering pt non-pharmacologic pain interventions. Reassessing pain and pts response to pain intervention.

## 2021-11-03 NOTE — PROGRESS NOTES
Physician Progress Note      Angel Og  CSN #:                  [id number]  :                       1935  ADMIT DATE:       2021 2:57 PM  [address] [place] DATE:  RESPONDING  PROVIDER #:        Marcellina Mussel ENZWEILER APRN - CNP        QUERY TEXT:    Stage of Chronic Kidney Disease: Please provide further specificity, if known. Clinical indicators include: ckd (chronic kidney disease, bun, creatinine,   chronic kidney disease  Options provided:  -- Chronic kidney disease stage 1  -- Chronic kidney disease stage 2  -- Chronic kidney disease stage 3  -- Chronic kidney disease stage 3a  -- Chronic kidney disease stage 3b  -- Chronic kidney disease stage 4  -- Chronic kidney disease stage 5  -- Chronic kidney disease stage 5, requiring dialysis  -- End stage renal disease  -- Other - I will add my own diagnosis  -- Disagree - Not applicable / Not valid  -- Disagree - Clinically Unable to determine / Unknown        PROVIDER RESPONSE TEXT:    Provider was unable to determine a response for this query.       Electronically signed by:  Misty Stone CNP 11/3/2021 2:56 PM

## 2021-11-03 NOTE — PROGRESS NOTES
4 Eyes Skin Assessment     NAME:  Eloy Dye  YOB: 1935  MEDICAL RECORD NUMBER:  [id number]    The patient is being assess for  Admission    I agree that 2 RN's have performed a thorough Head to Toe Skin Assessment on the patient. ALL assessment sites listed below have been assessed. Areas assessed by both nurses:    Head, Face, Ears, Shoulders, Back, Chest, Arms, Elbows, Hands, Sacrum. Buttock, Coccyx, Ischium and Legs. Feet and Heels        Does the Patient have a Wound?  No noted wound(s)       Benson Prevention initiated:  Yes   Wound Care Orders initiated:  No    Pressure Injury (Stage 3,4, Unstageable, DTI, NWPT, and Complex wounds) if present place consult order under [de-identified] No    New and Established Ostomies if present place consult order under : No      Nurse 1 eSignature: Electronically signed by Valeri Leonard RN on 11/3/21 at 2:37 AM EDT    **SHARE this note so that the co-signing nurse is able to place an eSignature**    Nurse 2 eSignature: Electronically signed by Genevieve Blanchard RN on 11/3/21 at 2:39 AM EDT

## 2021-11-04 ENCOUNTER — ANESTHESIA EVENT (OUTPATIENT)
Dept: ENDOSCOPY | Age: 86
DRG: 313 | End: 2021-11-04
Payer: MEDICARE

## 2021-11-04 PROBLEM — E43 SEVERE MALNUTRITION (HCC): Chronic | Status: ACTIVE | Noted: 2021-11-04

## 2021-11-04 LAB
FOLATE: 10.54 NG/ML (ref 4.78–24.2)
SARS-COV-2, NAAT: NOT DETECTED
TROPONIN: 0.02 NG/ML
TROPONIN: 0.03 NG/ML
TROPONIN: 0.04 NG/ML
TROPONIN: 0.04 NG/ML
TSH REFLEX: 1.59 UIU/ML (ref 0.27–4.2)
VITAMIN B-12: 454 PG/ML (ref 211–911)
VITAMIN D 25-HYDROXY: 82.7 NG/ML

## 2021-11-04 PROCEDURE — 2060000000 HC ICU INTERMEDIATE R&B

## 2021-11-04 PROCEDURE — 36415 COLL VENOUS BLD VENIPUNCTURE: CPT

## 2021-11-04 PROCEDURE — 2580000003 HC RX 258: Performed by: HOSPITALIST

## 2021-11-04 PROCEDURE — 84484 ASSAY OF TROPONIN QUANT: CPT

## 2021-11-04 PROCEDURE — 6360000002 HC RX W HCPCS: Performed by: HOSPITALIST

## 2021-11-04 PROCEDURE — 87635 SARS-COV-2 COVID-19 AMP PRB: CPT

## 2021-11-04 PROCEDURE — 94640 AIRWAY INHALATION TREATMENT: CPT

## 2021-11-04 PROCEDURE — 82607 VITAMIN B-12: CPT

## 2021-11-04 PROCEDURE — 92610 EVALUATE SWALLOWING FUNCTION: CPT

## 2021-11-04 PROCEDURE — 6370000000 HC RX 637 (ALT 250 FOR IP): Performed by: HOSPITALIST

## 2021-11-04 PROCEDURE — 82746 ASSAY OF FOLIC ACID SERUM: CPT

## 2021-11-04 PROCEDURE — 84443 ASSAY THYROID STIM HORMONE: CPT

## 2021-11-04 PROCEDURE — 94760 N-INVAS EAR/PLS OXIMETRY 1: CPT

## 2021-11-04 PROCEDURE — 82306 VITAMIN D 25 HYDROXY: CPT

## 2021-11-04 RX ORDER — POLYETHYLENE GLYCOL 3350 17 G/17G
17 POWDER, FOR SOLUTION ORAL DAILY
Status: DISCONTINUED | OUTPATIENT
Start: 2021-11-05 | End: 2021-11-05 | Stop reason: HOSPADM

## 2021-11-04 RX ADMIN — ONDANSETRON 4 MG: 4 TABLET, ORALLY DISINTEGRATING ORAL at 09:09

## 2021-11-04 RX ADMIN — POLYETHYLENE GLYCOL 3350 17 G: 17 POWDER, FOR SOLUTION ORAL at 09:17

## 2021-11-04 RX ADMIN — ONDANSETRON 4 MG: 2 INJECTION INTRAMUSCULAR; INTRAVENOUS at 15:03

## 2021-11-04 RX ADMIN — CLONAZEPAM 0.5 MG: 0.5 TABLET ORAL at 15:03

## 2021-11-04 RX ADMIN — CLONAZEPAM 0.5 MG: 0.5 TABLET ORAL at 20:25

## 2021-11-04 ASSESSMENT — PAIN SCALES - GENERAL
PAINLEVEL_OUTOF10: 0

## 2021-11-04 NOTE — CONSULTS
GASTROENTEROLOGY INPATIENT CONSULTATION        IDENTIFYING DATA/REASON FOR CONSULTATION   PATIENT:  Matthieu Fuentes  MRN:  [id number]  ADMIT DATE: 2021  TIME OF EVALUATION: 2021 4:24 PM  HOSPITAL STAY:   LOS: 2 days     REASON FOR CONSULTATION:  Weight loss    HISTORY OF PRESENT ILLNESS   Matthieu Fuentes is a 82 y.o. female with a PMH of CKD,  who presented on 2021 with generalized body aches, weakness, nausea, poor appetite. Work up in ED noted elevated troponin. She was admitted, seen by Cardiology. ECHO was unremarkable. Plan is for outpt stress test.  We have been consulted regarding weight loss and early satiety. Patient reports of constant nausea occurring for months. She has lost 20 pounds which she attributes to eating only 1 meal a day over the past 6 months. She suffers from constipation and takes MiraLAX daily. She denies any episodes of melena or hematochezia. She denies NSAID use. She is not on any long-term oral anticoagulation therapy. She has never had an EGD or colonoscopy procedure. Recent CT A/P 10/29/2021 no showed possible pneumonia otherwise unremarkable.         PAST MEDICAL, SURGICAL, FAMILY, and SOCIAL HISTORY     Past Medical History:   Diagnosis Date    Asthma     Cancer ([address]     Breast cancer on left side     CKD (chronic kidney disease)     Depression     GERD (gastroesophageal reflux disease)     Neuropathy     Osteoporosis     Pernicious anemia     S/P thyroid biopsy     Scoliosis      Past Surgical History:   Procedure Laterality Date     SECTION       SECTION      x`s    KNEE SURGERY Left 2020    INCISION AND DRAINAGE LEFT KNEE performed by Bebe Lucio MD at [facility] [address]  2021    US THYROID BIOPSY 2021 [facility]ASOUND     Family History   Problem Relation Age of Onset    Cancer Mother     Heart Disease Father      Social History     Socioeconomic History    Marital status:      Spouse name: None    Number of children: None    Years of education: None    Highest education level: None   Occupational History    None   Tobacco Use    Smoking status: Never Smoker    Smokeless tobacco: Never Used   Vaping Use    Vaping Use: Never used   Substance and Sexual Activity    Alcohol use: No    Drug use: No    Sexual activity: Not Currently   Other Topics Concern    None   Social History Narrative    None     Social Determinants of Health     Financial Resource Strain:     Difficulty of Paying Living Expenses:    Food Insecurity:     Worried About Running Out of Food in the Last Year:     Ran Out of Food in the Last Year:    Transportation Needs:     Lack of Transportation (Medical):      Lack of Transportation (Non-Medical):    Physical Activity:     Days of Exercise per Week:     Minutes of Exercise per Session:    Stress:     Feeling of Stress :    Social Connections:     Frequency of Communication with Friends and Family:     Frequency of Social Gatherings with Friends and Family:     Attends Confucianist Services:     Active Member of Clubs or Organizations:     Attends Club or Organization Meetings:     Marital Status:    Intimate Partner Violence:     Fear of Current or Ex-Partner:     Emotionally Abused:     Physically Abused:     Sexually Abused:        MEDICATIONS   SCHEDULED:  [START ON 11/5/2021] polyethylene glycol, 17 g, Daily  anastrozole, 1 mg, Nightly  ondansetron, 4 mg, Once  Calcium Carb-Cholecalciferol, 1 tablet, BID WC  budesonide-formoterol, 2 puff, BID  gabapentin, 200 mg, Nightly  pantoprazole, 40 mg, QAM AC  venlafaxine, 150 mg, Daily  sodium chloride flush, 5-40 mL, 2 times per day  enoxaparin, 40 mg, Daily      FLUIDS/DRIPS:     sodium chloride       PRNs: albuterol, 2.5 mg, Q6H PRN  clonazePAM, 0.5 mg, BID PRN  sodium chloride flush, 5-40 mL, PRN  sodium chloride, 25 mL, PRN  ondansetron, 4 mg, Q8H PRN   Or  ondansetron, 4 mg, Q6H PRN  acetaminophen, 650 mg, Q6H PRN   Or  acetaminophen, 650 mg, Q6H PRN      ALLERGIES:  She   Allergies   Allergen Reactions    Latex Hives and Itching       REVIEW OF SYSTEMS   Pertinent ROS noted in HPI    PHYSICAL EXAM     Vitals:    11/04/21 0432 11/04/21 0718 11/04/21 1117 11/04/21 1425   BP: (!) 166/80 (!) 158/71 115/68    Pulse: 67 69 67    Resp: 16 20 20    Temp: 98.3 °F (36.8 °C) 97.9 °F (36.6 °C) 97.5 °F (36.4 °C)    TempSrc: Oral Oral Oral    SpO2: 90% 94% 93%    Weight: 134 lb 7.7 oz (61 kg)      Height:    5' 4\" (1.626 m)       I/O last 3 completed shifts: In: 12 [P.O.:960]  Out: 675 [Urine:675]      Physical Exam:  General appearance: alert, cooperative, no distress, appears stated age  Eyes: Anicteric  Head: Normocephalic, without obvious abnormality  Lungs: clear to auscultation bilaterally, Normal Effort  Heart: regular rate and rhythm, normal S1 and S2, no murmurs or rubs  Abdomen: soft, non-distended, non-tender. Bowel sounds normal. No masses,  no organomegaly. Extremities: atraumatic, no cyanosis or edema  Skin: warm and dry, no jaundice  Neuro: Grossly intact, A&OX3      LABS AND IMAGING   Laboratory   Recent Labs     11/02/21  1608 11/03/21  0340   WBC 4.2 4.3   HGB 11.9* 11.3*   HCT 36.7 34.6*   MCV 94.1 94.1    156     Recent Labs     11/02/21  1608 11/03/21  0340    141   K 4.0 3.7    108   CO2 25 24   BUN 20 18   CREATININE 0.9 0.9     Recent Labs     11/02/21  1608 11/03/21  0340   AST 20 16   ALT 13 11   BILIDIR  --  <0.2   BILITOT 0.6 0.5   ALKPHOS 61 54     No results for input(s): LIPASE, AMYLASE in the last 72 hours. No results for input(s): PROTIME, INR in the last 72 hours. Imaging  XR CHEST PORTABLE   Final Result   No active cardiopulmonary disease               ASSESSMENT AND RECOMMENDATIONS   82 y.o. female with a PMH of CKD,  who presented on 11/2/2021 with generalized body aches, weakness, nausea, poor appetite. Work up in ED noted elevated troponin.   She was admitted, seen by Cardiology. ECHO was unremarkable. Plan is for outpt stress test.  We have been consulted regarding weight loss and early satiety. IMPRESSION:  1. Nausea, early satiety, unintentional weight loss  -CT a/p 10/29/2021 neg for any concerning mass lesions  -No prior EGD or colonoscopy procedure  -Denies NSAID use. No smoking hx          RECOMMENDATIONS:    EGD tomorrow. N.p.o. after midnight. If you have any questions or need any further information, please feel free to contact our consult team.  Thank you for allowing us to participate in the care of Juan Jose Rodriguez. The note was completed using Dragon voice recognition transcription. Every effort was made to ensure accuracy; however, inadvertent transcription errors may be present despite my best efforts to edit errors.       Rachael Huddleston PA-C

## 2021-11-04 NOTE — PLAN OF CARE
Problem: Falls - Risk of:  Goal: Will remain free from falls  Description: Will remain free from falls  11/4/2021 0732 by Nolvia Simmons RN  Outcome: Ongoing     Problem: Falls - Risk of:  Goal: Absence of physical injury  Description: Absence of physical injury  11/4/2021 0732 by Nolvia Simmons RN  Outcome: Ongoing  Bed alarm on, bed in lowest position, wheels locked. Fall risk assessment completed every shift. Nonskid socks on, fall sign posted. Pt educated on use of call light. Problem: Pain:  Goal: Pain level will decrease  Description: Pain level will decrease  11/4/2021 0732 by Nolvia Simmons RN  Outcome: Ongoing     Problem: Pain:  Goal: Control of acute pain  Description: Control of acute pain  11/4/2021 0732 by Nolvia Simmons RN  Outcome: Ongoing     Problem: Pain:  Goal: Control of chronic pain  Description: Control of chronic pain  11/4/2021 0732 by Nolvia Simmons RN  Outcome: Ongoing  Assessing pain using appropriate pain rating scale q shift and PRN. Medicating pt as prescribed and indicated. Offering pt non-pharmacologic pain interventions. Reassessing pain and pts response to pain intervention.

## 2021-11-04 NOTE — CARE COORDINATION
INITIAL CASE MANAGEMENT ASSESSMENT    Reviewed chart, spoke with patient via phone at bedside to assess possible discharge needs. Explained Case Management role/services. Living Situation: pt lives with spouse in a tri level home, 5 steps to enter and 7 to bath and bedroom. Home address has been verified. ADLs: pt was independent pta. DME: pt states she uses a rolling walker. Shower chair, has a cane and wheelchair. PT/OT Recs: not ordered as of this date. Pt states she is weak. Active Services: none. Pt has had [facility] in past but not currently active. Transportation: pt and spouse are active drivers. Sister also can transport pt home at dc       Medications: pt uses [facility]. PCP: Saba Simpson MD.       HD/PD: none    PLAN/COMMENTS: pt is unsure of dc plan at this time. Pt hopes she can return home with spouse. Will await PT and OT evaluations regarding ambulation status. Provided info on [place] on aging for housekeeping. Pt states she and spouse go out to eat at restaurants for dinner meal.   Electronically signed by MICHAEL Galan on 11/4/2021 at 3:44 PM    SW/CM provided contact information for patient or family to call with any questions. SW/CM will follow and assist as needed.

## 2021-11-04 NOTE — PROGRESS NOTES
Hospitalist Progress Note      PCP: Pretty Rosa MD    Date of Admission: 11/2/2021    Chief Complaint: generalized body aches    Hospital Course: 86f admitted with multiple complaints of nausea, poss recent pna, generalized body aches / pains, found to have an elevated trop during work up in [place] [address]. Subjective: Patient denies chest pain, abdominal pain, sob, cough, complains of continued generalized weakness, fatigue, diffuse muscle aches, possibly swallowing difficulty / poor PO intake with early satiety    Medications:  Reviewed    Infusion Medications    sodium chloride       Scheduled Medications    anastrozole  1 mg Oral Nightly    ondansetron  4 mg IntraVENous Once    Calcium Carb-Cholecalciferol  1 tablet Oral BID WC    budesonide-formoterol  2 puff Inhalation BID    gabapentin  200 mg Oral Nightly    pantoprazole  40 mg Oral QAM AC    venlafaxine  150 mg Oral Daily    sodium chloride flush  5-40 mL IntraVENous 2 times per day    enoxaparin  40 mg SubCUTAneous Daily     PRN Meds: albuterol, clonazePAM, sodium chloride flush, sodium chloride, ondansetron **OR** ondansetron, polyethylene glycol, acetaminophen **OR** acetaminophen      Intake/Output Summary (Last 24 hours) at 11/4/2021 1232  Last data filed at 11/4/2021 1044  Gross per 24 hour   Intake 1200 ml   Output 875 ml   Net 325 ml       Physical Exam Performed:    /68   Pulse 67   Temp 97.5 °F (36.4 °C) (Oral)   Resp 20   Ht 5' 4\" (1.626 m)   Wt 134 lb 7.7 oz (61 kg)   SpO2 93%   BMI 23.08 kg/m²     General appearance: No apparent distress, appears stated age and cooperative. HEENT: Pupils equal, round, and reactive to light. Conjunctivae/corneas clear. Neck: Supple, with full range of motion. No jugular venous distention. Trachea midline. Respiratory:  Normal respiratory effort. No use of accessory muscles  Cardiovascular: Regular rate and rhythm    Abdomen: Soft, non-tender, non-distended   Musculoskeletal: No clubbing, cyanosis or edema bilaterally. No calf tenderness  Skin: Skin color, texture, turgor normal.    Neurologic:   grossly non-focal.  Psychiatric: Alert and oriented, thought content appropriate, normal insight         Labs:   Recent Labs     11/02/21  1608 11/03/21  0340   WBC 4.2 4.3   HGB 11.9* 11.3*   HCT 36.7 34.6*    156     Recent Labs     11/02/21  1608 11/03/21  0340    141   K 4.0 3.7    108   CO2 25 24   BUN 20 18   CREATININE 0.9 0.9   CALCIUM 10.5 10.0     Recent Labs     11/02/21  1608 11/03/21  0340   AST 20 16   ALT 13 11   BILIDIR  --  <0.2   BILITOT 0.6 0.5   ALKPHOS 61 54     No results for input(s): INR in the last 72 hours. Recent Labs     11/03/21  2347 11/04/21  0346 11/04/21  0840   TROPONINI 0.03* 0.04* 0.04*       Urinalysis:      Lab Results   Component Value Date    NITRU Negative 11/02/2021    WBCUA 1 02/10/2018    RBCUA 3 02/10/2018    BLOODU Negative 11/02/2021    SPECGRAV 1.010 11/02/2021    GLUCOSEU Negative 11/02/2021       Radiology:  XR CHEST PORTABLE   Final Result   No active cardiopulmonary disease                 Assessment/Plan:    Active Hospital Problems    Diagnosis Date Noted    Chest pain [R07.9] 11/02/2021     1) Elev trop  - outpatient stress, dc trop    2) Recent PNA  - afebrile, stable on room air    3) Unintentional 20 lb weight loss /early satiety  - poss PUD? Continue PPI, consult GI  - TSH, vit d b12, folate    DVT Prophylaxis: lovenox  Diet: ADULT DIET; Regular;  Low Sodium (2 gm)  Code Status: Full Code   Davey Gillespie MD

## 2021-11-04 NOTE — PROGRESS NOTES
Pt with complaints of nausea for this RN. Medicated with PRN zofran, see mar. Pt states that she has been nauseous for weeks, and nothing she seems to do helps with her nausea. Per patient and pts sister, she has lost weight over the last few months due to nausea and loss of appetite.  This RN will discuss these findings with attending MD      [phone]: Dr. Jayme Johnston notified     Electronically signed by Lata Noland RN on 11/4/2021 at 9:23 AM

## 2021-11-04 NOTE — PROGRESS NOTES
Comprehensive Nutrition Assessment    Type and Reason for Visit:  Initial    Nutrition Recommendations/Plan:   Low Na diet   Ensure TID  Will monitor nutritional adequacy, nutrition-related labs, weights, BMs, and clinical progress     Nutrition Assessment:  + Screen for MST 3. Pt admitted with chest pain. Hx includes Br ca, CKD, GERD, Anemia. Pt with decreased intake, usually eating one meal per day. Also with chronic nausea for last several months. Wt trending down, 149 lb to 134 lb over 4 months. Pt is favorable to ONS. Tolerating current diet during this admission. Malnutrition Assessment:  Malnutrition Status:  Severe malnutrition    Context:  Chronic Illness     Findings of the 6 clinical characteristics of malnutrition:  Energy Intake:  7 - 75% or less estimated energy requirements for 1 month or longer  Weight Loss:  7 - Greater than 10% over 6 months     Body Fat Loss:  1 - Mild body fat loss Orbital, Triceps, Buccal region   Muscle Mass Loss:  1 - Mild muscle mass loss (actually moderate) Temples (temporalis), Clavicles (pectoralis & deltoids), Hand (interosseous)  Fluid Accumulation:  No significant fluid accumulation     Strength:  Not Performed    Estimated Daily Nutrient Needs:  Energy (kcal):  6071-2242 kcal (25-30 kcal/kg ABW); Weight Used for Energy Requirements:  Current     Protein (g):  61-73 gm (1-1.2 gm/kg ABW); Weight Used for Protein Requirements:  Current        Fluid (ml/day):   ; Method Used for Fluid Requirements:  1 ml/kcal      Nutrition Related Findings:  Skin intact      Wounds:  None       Current Nutrition Therapies:    ADULT DIET; Regular;  Low Sodium (2 gm)  Diet NPO    Anthropometric Measures:  · Height: 5' 4\" (162.6 cm)  · Current Body Weight: 134 lb (60.8 kg)   · Admission Body Weight: 138 lb (62.6 kg)    · Usual Body Weight: 149 lb (67.6 kg)     · Ideal Body Weight: 120 lbs; % Ideal Body Weight 111.7 %   · BMI: 23  · Adjusted Body Weight:  ; No Adjustment   · BMI Categories: Normal Weight (BMI 22.0 to 24.9) age over 72       Nutrition Diagnosis:   · Severe malnutrition related to inadequate protein-energy intake as evidenced by poor intake prior to admission, weight loss greater than or equal to 10% in 6 months, mild loss of subcutaneous fat, moderate muscle loss (x 4 months)    Nutrition Interventions:   Food and/or Nutrient Delivery:  Continue Current Diet, Start Oral Nutrition Supplement  Nutrition Education/Counseling:  No recommendation at this time   Coordination of Nutrition Care:  Continue to monitor while inpatient    Goals:  Consume greater than 50% of meals and supplements this admission       Nutrition Monitoring and Evaluation:   Behavioral-Environmental Outcomes:  None Identified   Food/Nutrient Intake Outcomes:  Food and Nutrient Intake, Supplement Intake  Physical Signs/Symptoms Outcomes:  Biochemical Data, Nutrition Focused Physical Findings, Skin, Weight, Nausea or Vomiting     Discharge Planning:    Continue Oral Nutrition Supplement     Electronically signed by Ilya Joseph RD, LD on 11/4/21 at 2:30 PM EDT    Contact: [phone]

## 2021-11-04 NOTE — PROGRESS NOTES
Narcotic Waste Documentation    Administered 100 mg of gabapentin and wasted 618 mg per [facility].   Electronically signed by Dayday Son RN on 11/3/2021 at 9:48 PM

## 2021-11-04 NOTE — PROGRESS NOTES
Speech Language Pathology  Facility/Department: [address] CARE   CLINICAL BEDSIDE SWALLOW EVALUATION    NAME: Yung Keene  : 1935  MRN: [id number]    ADMISSION DATE: 2021  ADMITTING DIAGNOSIS: The primary encounter diagnosis was Abdominal pain, epigastric. Diagnoses of Nausea and Elevated troponin were also pertinent to this visit. · has Malignant neoplasm of upper-outer quadrant of left breast in female, estrogen receptor positive ([address] Status post partial mastectomy of left breast; Knee laceration, left, initial encounter; Laceration of knee with complication, initial encounter; and Chest pain on their problem list.  ·  has a past medical history of Asthma, Cancer ([address] CKD (chronic kidney disease), Depression, GERD (gastroesophageal reflux disease), Neuropathy, Osteoporosis, Pernicious anemia, S/P thyroid biopsy, and Scoliosis. · Allergy to latex  ONSET DATE: 2021      Date of Eval: 2021  Evaluating Therapist: Robel Borjas SLP    Chart Review  · MD History and Physical Documentation revealed:   History Of Present Illness:      82 y.o. female presents with a history of nausea recently worked up with a ct abd with incidental finding of pneumonia for which she was placed on an oral antibiotic. She denies any sob, cough, fever, chills, alteration in taste/smell. She's had chronic bilat arm/leg discomfort, denies any worsening, but has noted worsening generalized weakness prompting ER consultation where she was found to have an elevated troponin. She denies chest pain, palpitations, worsening jaime, leg swelling calf pain.     2021 Chest XR  Impression   No active cardiopulmonary disease         2021 MD order for Clinical Swallow Evaluation due to documented: \"complains of continued generalized weakness, fatigue, diffuse muscle aches, possibly swallowing difficulty / poor PO intake with early satiety\"    Current Diet level:  Current Diet : Regular  Current Liquid Diet : Thin      Primary Complaint  ·  Pt reports nausea; acid reflux; and concern regarding a lump on her neck. · She reports she has seen a few doctors about it but is confused as to findings. Reason for Referral  Carrie Mackey was referred for a bedside swallow evaluation to assess the efficiency of her swallow function, identify signs and symptoms of aspiration and make recommendations regarding safe dietary consistencies, effective compensatory strategies, and safe eating environment. Assessment Impression  1. Pt was sitting on the side of the bed upon SLP entry. Pt was oriented to self; place;month/year and partially to situation. Pt was verbally responsive but at times a vague/conflicting historian. Pt was able to follow commands with intermittent cues. Pt did have a visitor which pt did want to stay for evaluation and actually provided some history information    2. Dysphagia Diagnosis:  Potential Pharyngeal dysphagia characterized by delayed initiation of swallow. Pt demonstrated functional mastication and ;bolus prep and A-P oral transit without anterior loss or oral pocketing. · Pt tolerated all presentations without overt clinical s/s of aspiration; without post swallow complaints; without post swallow voice quality changes. Plan to continue diet as desired. F/u x 1-2 for diet consistency tolerance     3. Pt with self reported heart/burn/GERD and on meds. GERD precautions recommended for meals and med pass  Pt does have a late order for po for 11/5/2021 and schedule of potential EGD. However no GI documentation to confirm, at time of this writer's documentation. Dysphagia Outcome Severity Scale: Level 6: Within functional limits/Modified independence     Treatment Plan  Requires SLP Intervention: Yes (f/u x 1-2 times for diet consistency tolerance)  Duration/Frequency of Treatment: 1-2 times for diet consistency f/u  D/C Recommendations:  To be determined       Recommended Diet and Intervention  Diet Solids Recommendation: Regular (as desired)  Liquid Consistency Recommendation: Thin   meds as desired; close monitor if with H20     Therapeutic Interventions: Diet tolerance monitoring; Therapeutic PO trials with SLP;Patient/Family education    Compensatory Swallowing Strategies   Upright as possible for all oral intake;Small bites/sips;Swallow 2 times per bite/sip; Remain upright for 30-45 minutes after meals (GERD precautions. Pt reports history of and current s/s . Pt reports on meds for heart burn/indigestion at home)    Treatment/Goals   Pt goal is for continued diet consistencies as desired  Dysphagia Goals:  1. The patient will tolerate regular food and thin liquid consistency diet, as desired, without observed clinical signs of aspiration; 2. The patient/caregiver will demonstrate understanding of compensatory strategies for improved swallowing safety. 3. If status changes and concern for aspiration due to pharyngeal dysphagia; pt will participate in instrumental assessment    General  Chart Reviewed: Yes  Behavior/Cognition: Alert; Cooperative;Pleasant mood (Pt is a vague/conflicting historian)  Respiratory Status: Room air  Communication Observation:  (Pt expressing likes/dislikes; but can be a vague/conflicting historian)  Follows Directions: Simple  Dentition: Some missing teeth  Prior Dysphagia History: Pt with self reported history of indigestion/heart burn/reflux. Pt self reports on heart burn medicine    Patient Positioning:  (sitting on side of bed)    Consistencies Administered: Reg solid; Dysphagia Soft and Bite-Sized (Dysphagia III); Dysphagia Minced and Moist (Dysphagia II); Dysphagia Pureed (Dysphagia I); Nectar - cup; Thin - cup    Pain: denied    Vision/Hearing  Vision  Vision: Impaired  Vision Exceptions: Wears glasses at all times  Hearing  Hearing: Exceptions to [facility]  Hearing Exceptions: Hard of hearing/hearing concerns; No hearing aid    Oral Motor Deficits  Oral/Motor  Oral Motor: Exceptions to [facility]  Labial ROM:  (good rom and strength bilaterally)  Lingual ROM:  (good midline rom for protrusion/elevation; good lingual lateral rom)  Gag:  (diminished to absent)   Vocal Quality:  (unremarkable)  Volitional Cough: Weak  Volitional Swallow: Delayed    Oral Phase Dysfunction  Oral Phase  Oral Phase: WFL (Mastication appeared functional ; bolus prep and A-P manipulation appeared functional. No anterior loss and no significant oral pocketing)     Indicators of Pharyngeal Phase Dysfunction   Pharyngeal Phase  Pharyngeal Phase: Exceptions  Indicators of Pharyngeal Phase Dysfunction  Delayed Swallow: All  Pharyngeal Phase   Pharyngeal: No overt clinical s/s of aspiration post swallow ; no voice quality changes post swallow; no pt complaints post swallow    Prognosis  Prognosis  Prognosis for safe diet advancement: good  Individuals consulted  Consulted and agree with results and recommendations: Patient (family friend)    Education  Patient Education Response: Verbalizes understanding;Needs reinforcement  Safety Devices in place: Yes  Type of devices: All fall risk precautions in place; Bed alarm in place;Call light within reach;Nurse notified       Therapy Time  SLP Individual Minutes  Time In: 2424  Time Out: [address]  Minutes: Mateo White,MS,CCC,SLP 2709  Speech and Language Pathologist  11/4/2021 1:57 PM

## 2021-11-04 NOTE — PLAN OF CARE
Problem: Nutrition  Goal: Optimal nutrition therapy  Outcome: Ongoing     Nutrition Problem #1: Severe malnutrition  Intervention: Food and/or Nutrient Delivery: Continue Current Diet, Start Oral Nutrition Supplement  Nutritional Goals: Consume greater than 50% of meals and supplements this admission

## 2021-11-04 NOTE — ACP (ADVANCE CARE PLANNING)
Advance Care Planning     Advance Care Planning Activator (Inpatient)  Conversation Note      Date of ACP Conversation: 11/4/2021     Conversation Conducted with: Patient with Decision Making Capacity    ACP Activator: Caseydeclan Mir, [address] [facility] Decision Maker:     Current Designated Health Care Decision Maker:     Primary Decision Maker: Thien Parrish Spouse - [phone]    Secondary Decision Maker: Rex Masterson Child - [phone]      Care Preferences    Ventilation: \"If you were in your present state of health and suddenly became very ill and were unable to breathe on your own, what would your preference be about the use of a ventilator (breathing machine) if it were available to you? \"      Would the patient desire the use of ventilator (breathing machine)? :yes     \"If your health worsens and it becomes clear that your chance of recovery is unlikely, what would your preference be about the use of a ventilator (breathing machine) if it were available to you? \"     Would the patient desire the use of ventilator (breathing machine)? :yes       Resuscitation  \"CPR works best to restart the heart when there is a sudden event, like a heart attack, in someone who is otherwise healthy. Unfortunately, CPR does not typically restart the heart for people who have serious health conditions or who are very sick. \"    \"In the event your heart stopped as a result of an underlying serious health condition, would you want attempts to be made to restart your heart (answer \"yes\" for attempt to resuscitate) or would you prefer a natural death (answer \"no\" for do not attempt to resuscitate)? \" yes        [] Yes   [] No   Educated Patient / John Palmer regarding differences between Advance Directives and portable DNR orders.     Length of ACP Conversation in minutes:  10  Conversation Outcomes:  [x] ACP discussion completed  [] Existing advance directive reviewed with patient; no changes to patient's previously recorded wishes  [] New Advance Directive completed  [] Portable Do Not Rescitate prepared for Provider review and signature  [] POLST/POST/MOLST/MOST prepared for Provider review and signature      Follow-up plan:    [] Schedule follow-up conversation to continue planning  [] Referred individual to Provider for additional questions/concerns   [] Advised patient/agent/surrogate to review completed ACP document and update if needed with changes in condition, patient preferences or care setting    [] This note routed to one or more involved healthcare providers  Electronically signed by MICHAEL Pedersen on 11/4/2021 at 3:35 PM

## 2021-11-05 ENCOUNTER — ANESTHESIA (OUTPATIENT)
Dept: ENDOSCOPY | Age: 86
DRG: 313 | End: 2021-11-05
Payer: MEDICARE

## 2021-11-05 VITALS
DIASTOLIC BLOOD PRESSURE: 67 MMHG | OXYGEN SATURATION: 95 % | BODY MASS INDEX: 23 KG/M2 | HEIGHT: 64 IN | WEIGHT: 134.7 LBS | SYSTOLIC BLOOD PRESSURE: 125 MMHG | HEART RATE: 62 BPM | RESPIRATION RATE: 18 BRPM | TEMPERATURE: 98.4 F

## 2021-11-05 VITALS — DIASTOLIC BLOOD PRESSURE: 58 MMHG | OXYGEN SATURATION: 100 % | SYSTOLIC BLOOD PRESSURE: 118 MMHG

## 2021-11-05 PROCEDURE — 7100000001 HC PACU RECOVERY - ADDTL 15 MIN: Performed by: INTERNAL MEDICINE

## 2021-11-05 PROCEDURE — 2580000003 HC RX 258: Performed by: HOSPITALIST

## 2021-11-05 PROCEDURE — 3609012400 HC EGD TRANSORAL BIOPSY SINGLE/MULTIPLE: Performed by: INTERNAL MEDICINE

## 2021-11-05 PROCEDURE — 7100000000 HC PACU RECOVERY - FIRST 15 MIN: Performed by: INTERNAL MEDICINE

## 2021-11-05 PROCEDURE — 6360000002 HC RX W HCPCS: Performed by: INTERNAL MEDICINE

## 2021-11-05 PROCEDURE — 94640 AIRWAY INHALATION TREATMENT: CPT

## 2021-11-05 PROCEDURE — 2500000003 HC RX 250 WO HCPCS: Performed by: NURSE ANESTHETIST, CERTIFIED REGISTERED

## 2021-11-05 PROCEDURE — 6370000000 HC RX 637 (ALT 250 FOR IP): Performed by: INTERNAL MEDICINE

## 2021-11-05 PROCEDURE — 0DB68ZX EXCISION OF STOMACH, VIA NATURAL OR ARTIFICIAL OPENING ENDOSCOPIC, DIAGNOSTIC: ICD-10-PCS | Performed by: FAMILY MEDICINE

## 2021-11-05 PROCEDURE — 88305 TISSUE EXAM BY PATHOLOGIST: CPT

## 2021-11-05 PROCEDURE — 2580000003 HC RX 258: Performed by: STUDENT IN AN ORGANIZED HEALTH CARE EDUCATION/TRAINING PROGRAM

## 2021-11-05 PROCEDURE — 6360000002 HC RX W HCPCS: Performed by: NURSE ANESTHETIST, CERTIFIED REGISTERED

## 2021-11-05 PROCEDURE — 3700000000 HC ANESTHESIA ATTENDED CARE: Performed by: INTERNAL MEDICINE

## 2021-11-05 PROCEDURE — 94761 N-INVAS EAR/PLS OXIMETRY MLT: CPT

## 2021-11-05 PROCEDURE — 92526 ORAL FUNCTION THERAPY: CPT

## 2021-11-05 PROCEDURE — 2709999900 HC NON-CHARGEABLE SUPPLY: Performed by: INTERNAL MEDICINE

## 2021-11-05 RX ORDER — LIDOCAINE HYDROCHLORIDE 20 MG/ML
INJECTION, SOLUTION EPIDURAL; INFILTRATION; INTRACAUDAL; PERINEURAL PRN
Status: DISCONTINUED | OUTPATIENT
Start: 2021-11-05 | End: 2021-11-05 | Stop reason: SDUPTHER

## 2021-11-05 RX ORDER — SODIUM CHLORIDE 9 MG/ML
INJECTION, SOLUTION INTRAVENOUS CONTINUOUS
Status: DISCONTINUED | OUTPATIENT
Start: 2021-11-05 | End: 2021-11-05

## 2021-11-05 RX ORDER — ONDANSETRON 2 MG/ML
4 INJECTION INTRAMUSCULAR; INTRAVENOUS
Status: DISCONTINUED | OUTPATIENT
Start: 2021-11-05 | End: 2021-11-05

## 2021-11-05 RX ORDER — SODIUM CHLORIDE 0.9 % (FLUSH) 0.9 %
5-40 SYRINGE (ML) INJECTION EVERY 12 HOURS SCHEDULED
Status: DISCONTINUED | OUTPATIENT
Start: 2021-11-05 | End: 2021-11-05

## 2021-11-05 RX ORDER — SODIUM CHLORIDE 9 MG/ML
25 INJECTION, SOLUTION INTRAVENOUS PRN
Status: DISCONTINUED | OUTPATIENT
Start: 2021-11-05 | End: 2021-11-05

## 2021-11-05 RX ORDER — PROPOFOL 10 MG/ML
INJECTION, EMULSION INTRAVENOUS CONTINUOUS PRN
Status: DISCONTINUED | OUTPATIENT
Start: 2021-11-05 | End: 2021-11-05 | Stop reason: SDUPTHER

## 2021-11-05 RX ORDER — SODIUM CHLORIDE 0.9 % (FLUSH) 0.9 %
5-40 SYRINGE (ML) INJECTION PRN
Status: DISCONTINUED | OUTPATIENT
Start: 2021-11-05 | End: 2021-11-05

## 2021-11-05 RX ORDER — PROPOFOL 10 MG/ML
INJECTION, EMULSION INTRAVENOUS PRN
Status: DISCONTINUED | OUTPATIENT
Start: 2021-11-05 | End: 2021-11-05 | Stop reason: SDUPTHER

## 2021-11-05 ASSESSMENT — PULMONARY FUNCTION TESTS
PIF_VALUE: 0
PIF_VALUE: 1
PIF_VALUE: 0
PIF_VALUE: 1
PIF_VALUE: 1
PIF_VALUE: 0
PIF_VALUE: 1
PIF_VALUE: 0
PIF_VALUE: 1
PIF_VALUE: 1
PIF_VALUE: 0
PIF_VALUE: 1

## 2021-11-05 ASSESSMENT — PAIN SCALES - GENERAL
PAINLEVEL_OUTOF10: 0
PAINLEVEL_OUTOF10: 0

## 2021-11-05 ASSESSMENT — PAIN - FUNCTIONAL ASSESSMENT: PAIN_FUNCTIONAL_ASSESSMENT: 0-10

## 2021-11-05 ASSESSMENT — ENCOUNTER SYMPTOMS: SHORTNESS OF BREATH: 0

## 2021-11-05 NOTE — DISCHARGE SUMMARY
[facility]   Discharge Summary    Patient:  Carrie Mackey  YOB: 1935    MRN: [id number]   Acct: [de-identified]    Primary Care Physician: Elliot Ace MD    Admit date:  11/2/2021    Discharge date:   11/5/2021      Discharge Diagnoses: Active Problems:    Chest pain    Severe malnutrition [facility])          Admitted for: (HPI) patient admitted with chest pain    Hospital Course:  86f admitted with multiple complaints of nausea, poss recent pna, generalized body aches / pains, found to have an elevated trop during work up in ER. She had continued multiple complaints of body aches, chest discomfort, abdominal discomfort, nausea, poor PO tolerance. Cardiology was consulted, echocardiogram was obtained with normal lv function with recommended outpatient stress test.  Weight loss, nausea, abd discomfort was followed up by GI consult with normal EGD. Patient remains hemodynamically stable, afebrile with further eval per PCP on outpatient basis.     Consultants:  GI -Dr. Shira Fishman ; Card - Dr Burt Factor    Discharge Medications:       Medication List      CONTINUE taking these medications    albuterol sulfate  (90 Base) MCG/ACT inhaler     alendronate 70 MG tablet  Commonly known as: FOSAMAX     anastrozole 1 MG tablet  Commonly known as: ARIMIDEX     calcium citrate-vitamin D 315-250 MG-UNIT Tabs per tablet  Commonly known as: CITRICAL + D     clonazePAM 0.5 MG tablet  Commonly known as: KLONOPIN     fluticasone-salmeterol 115-21 MCG/ACT inhaler  Commonly known as: ADVAIR HFA     gabapentin 100 MG capsule  Commonly known as: NEURONTIN     mirtazapine 15 MG tablet  Commonly known as: REMERON     omeprazole 20 MG delayed release capsule  Commonly known as: PRILOSEC     polyethylene glycol 17 g packet  Commonly known as: GLYCOLAX     venlafaxine 150 MG extended release capsule  Commonly known as: EFFEXOR XR     VITAMIN D2 PO              Physical Exam:    Vitals:  Vitals:    11/05/21 1006 11/05/21 1018 11/05/21 1036 11/05/21 1200   BP: (!) 153/68 (!) 157/73 (!) 162/72 125/67   Pulse: 58 59 64 62   Resp: 21 19 18 18   Temp:   97.6 °F (36.4 °C) 98.4 °F (36.9 °C)   TempSrc:   Oral Oral   SpO2: 94% 95% 95%    Weight:       Height:         Weight: Weight: 134 lb 11.2 oz (61.1 kg)     24 hour intake/output:    Intake/Output Summary (Last 24 hours) at 11/5/2021 1310  Last data filed at 11/5/2021 1309  Gross per 24 hour   Intake 976.11 ml   Output 600 ml   Net 376.11 ml       General appearance - alert, well appearing, and in no distress  Chest - no dsitress on room ai  Heart - normal rate, regular rhythm   Abdomen - soft, nontender, nondistended   Obese: No; Protuberant: No   Neurological - alert, oriented, normal speech  Extremities - peripheral pulses normal, no pedal edema  Skin - normal coloration and turgor, no rashes   Radiology reports as per the Radiologist  Radiology: ECHO Complete 2D W Doppler W Color    Result Date: 11/3/2021  Transthoracic Echocardiography Report (TTE)  Demographics   Patient [facility]       [facility]   Date of Study      11/03/2021         Gender              Female   Patient Number     [id number]         Date of Birth       1935   Visit Number       [id number]          Age                 82 year(s)   Accession Number   [id number]         Room Number         8945   Corporate ID       [id number]            Sonographer         Dejan Chery,                                                            [address] [facility]   Ordering Physician Matt Cohen   Interpreting        [address] [place] [address]            Physician           Car Coronel MD  Procedure Type of Study   TTE procedure:ECHOCARDIOGRAM COMPLETE WITH BUBBLE STUDY. Procedure Date Date: 11/03/2021 Start: 01:51 PM Study Location: \[facility]\"" [facility] - [facility] - Echo Lab Technical Quality: Adequate visualization Indications:Dyspnea/SOB.  Additional Indications:CKD Hx of Breast Cancer. Patient Status: Routine Contrast Medium: Bubble Study. Height: 64 inches Weight: 134 pounds BSA: 1.65 m2 BMI: 23 kg/m2 Rhythm: Within normal limits HR: 66 bpm BP: 145/77 mmHg  Conclusions   Summary  Ejection fraction is visually estimated to be 60-65%. No regional wall motion abnormalities are noted. Normal diastolic function. The left atrium is mildly dilated. Normal right ventricular size and function. No significant valvular heart disease  Estimated pulmonary artery systolic pressure is normal at 32 mmHg assuming a  right atrial pressure of 3 mmHg. A bubble study was performed and fails to show evidence of shunting. Signature   ------------------------------------------------------------------  Electronically signed by Mayra Ly MD (Interpreting  physician) on 11/03/2021 at 04:52 PM  ------------------------------------------------------------------   Findings   Left Ventricle  Left ventricular cavity size is normal.  Normal left ventricular wall thickness. Ejection fraction is visually estimated to be 60-65%. No regional wall motion abnormalities are noted. Normal diastolic function. Mitral Valve  Calcification of the posterior leaflet of the mitral valve. Trivial mitral regurgitation. No evidence of mitral stenosis. Left Atrium  The left atrium is mildly dilated. Aortic Valve  The aortic valve leaflets are not well visualized. Mild aortic regurgitation. No evidence of aortic valve stenosis. Aorta  The aortic root is normal in size. The ascending aorta is normal in size. Right Ventricle  Normal right ventricular size and function. Right ventricular systolic function is normal.  TAPSE= 2.1cm   Tricuspid Valve  Mild tricuspid regurgitation. No evidence of tricuspid stenosis. Right Atrium  The right atrium is normal in size. Pulmonic Valve  The pulmonic valve is not well visualized. Trivial pulmonic regurgitation present.   No evidence of pulmonic valve stenosis. Pericardial Effusion  No pericardial effusion noted. Pleural Effusion  No pleural effusion. Miscellaneous  IVC size is normal (<2.1cm) and collapses > 50% with respiration consistent  with normal RA pressure (3mmHg). Estimated pulmonary artery systolic pressure is normal at 32 mmHg assuming a  right atrial pressure of 3 mmHg. A bubble study was performed and fails to show evidence of shunting.   M-Mode/2D Measurements (cm)   LV Diastolic Dimension: 4.4 cm LV Systolic Dimension: 5.49 cm  LV Septum Diastolic: 2.59 cm  LV PW Diastolic: 0.8 cm        AO Root Dimension: 3.4 cm  RV Diastolic Dimension: 3.5 cm                                 LA Area: 20.6 cm2  LVOT: 1.9 cm                   LA volume/Index: 62.1 ml /38 ml/m2  Doppler Measurements   AV Peak Velocity: 172 cm/s     MV Peak E-Wave: 58.7 cm/s  AV Peak Gradient: 11.83 mmHg   MV Peak A-Wave: 78.4 cm/s  AV Mean Gradient: 7 mmHg       MV E/A Ratio: 0.75  LVOT Peak Velocity: 147 cm/s   MV P1/2t: 92 msec  AV Area (Continuity):2.22 cm2  MV Mean Gradient: 2 mmHg                                 MV Max P mmHg  TR Velocity:288 cm/s           MV Vmax:101 cm/s  TR Gradient:33.18 mmHg         MV VTI:32.9 cm/s  Estimated RAP:3 mmHg  E' Septal Velocity: 8.35 cm/s  MV Area (continuity): 2.43 cm2  E' Lateral Velocity: 11.5 cm/s MV Deceleration Time: 314 msec  PV Peak Velocity: 78 cm/s      MV Area (PHT): 2.39 cm2  PV Peak Gradient: 2.43 mmHg   Aortic Valve   Peak Velocity: 172 cm/s     Mean Velocity: 124 cm/s  Peak Gradient: 11.83 mmHg   Mean Gradient: 7 mmHg  Area (continuity): 2.22 cm2  AV VTI: 35.8 cm  AI P1/2t: 642 msec  Aorta   Aortic Root: 3.4 cm  Ascending Aorta: 2.9 cm  LVOT Diameter: 1.9 cm      XR CHEST PORTABLE    Result Date: 2021  EXAMINATION: ONE XRAY VIEW OF THE CHEST 2021 3:21 pm COMPARISON: 2015 HISTORY: ORDERING SYSTEM PROVIDED HISTORY: fatigue, nausea TECHNOLOGIST PROVIDED HISTORY: Reason for exam:->fatigue, nausea Reason for Exam: fatigue, nausea Acuity: Acute Type of Exam: Initial FINDINGS: Heart size and pulmonary vasculature within normal limits. Lungs clear except for granulomas in the right lower lung.   Costophrenic angles sharp     No active cardiopulmonary disease        Results for orders placed or performed during the hospital encounter of 11/02/21   COVID-19, Rapid   Result Value Ref Range    SARS-CoV-2, NAAT Not Detected Not Detected   Comprehensive Metabolic Panel w/ Reflex to MG   Result Value Ref Range    Sodium 142 136 - 145 mmol/L    Potassium reflex Magnesium 4.0 3.5 - 5.1 mmol/L    Chloride 105 99 - 110 mmol/L    CO2 25 21 - 32 mmol/L    Anion Gap 12 3 - 16    Glucose 83 70 - 99 mg/dL    BUN 20 7 - 20 mg/dL    CREATININE 0.9 0.6 - 1.2 mg/dL    GFR Non-African American 60 (A) >60    GFR African American >60 >60    Calcium 10.5 8.3 - 10.6 mg/dL    Total Protein 6.4 6.4 - 8.2 g/dL    Albumin 3.9 3.4 - 5.0 g/dL    Albumin/Globulin Ratio 1.6 1.1 - 2.2    Total Bilirubin 0.6 0.0 - 1.0 mg/dL    Alkaline Phosphatase 61 40 - 129 U/L    ALT 13 10 - 40 U/L    AST 20 15 - 37 U/L   CBC Auto Differential   Result Value Ref Range    WBC 4.2 4.0 - 11.0 K/uL    RBC 3.91 (L) 4.00 - 5.20 M/uL    Hemoglobin 11.9 (L) 12.0 - 16.0 g/dL    Hematocrit 36.7 36.0 - 48.0 %    MCV 94.1 80.0 - 100.0 fL    MCH 30.5 26.0 - 34.0 pg    MCHC 32.4 31.0 - 36.0 g/dL    RDW 14.1 12.4 - 15.4 %    Platelets 297 107 - 582 K/uL    MPV 8.3 5.0 - 10.5 fL    Neutrophils % 65.0 %    Lymphocytes % 22.7 %    Monocytes % 9.8 %    Eosinophils % 1.7 %    Basophils % 0.8 %    Neutrophils Absolute 2.7 1.7 - 7.7 K/uL    Lymphocytes Absolute 1.0 1.0 - 5.1 K/uL    Monocytes Absolute 0.4 0.0 - 1.3 K/uL    Eosinophils Absolute 0.1 0.0 - 0.6 K/uL    Basophils Absolute 0.0 0.0 - 0.2 K/uL   Urinalysis Reflex to Culture    Specimen: Urine, clean catch   Result Value Ref Range    Color, UA YELLOW Straw/Yellow    Clarity, UA Clear Clear    Glucose, Ur Negative Negative mg/dL    Bilirubin Urine Negative Negative    Ketones, Urine Negative Negative mg/dL    Specific Gravity, UA 1.010 1.005 - 1.030    Blood, Urine Negative Negative    pH, UA 6.5 5.0 - 8.0    Protein, UA Negative Negative mg/dL    Urobilinogen, Urine 0.2 <2.0 E.U./dL    Nitrite, Urine Negative Negative    Leukocyte Esterase, Urine Negative Negative    Microscopic Examination Not Indicated     Urine Type NotGiven     Urine Reflex to Culture Not Indicated    Troponin   Result Value Ref Range    Troponin 0.03 (H) <0.01 ng/mL   Troponin   Result Value Ref Range    Troponin 0.02 (H) <0.01 ng/mL   Troponin   Result Value Ref Range    Troponin 0.02 (H) <0.01 ng/mL   Troponin   Result Value Ref Range    Troponin 0.03 (H) <0.01 ng/mL   Troponin   Result Value Ref Range    Troponin 0.03 (H) <0.01 ng/mL   Basic Metabolic Panel w/ Reflex to MG   Result Value Ref Range    Sodium 141 136 - 145 mmol/L    Potassium reflex Magnesium 3.7 3.5 - 5.1 mmol/L    Chloride 108 99 - 110 mmol/L    CO2 24 21 - 32 mmol/L    Anion Gap 9 3 - 16    Glucose 93 70 - 99 mg/dL    BUN 18 7 - 20 mg/dL    CREATININE 0.9 0.6 - 1.2 mg/dL    GFR Non-African American 60 (A) >60    GFR African American >60 >60    Calcium 10.0 8.3 - 10.6 mg/dL   Hepatic function panel   Result Value Ref Range    Total Protein 5.6 (L) 6.4 - 8.2 g/dL    Albumin 3.3 (L) 3.4 - 5.0 g/dL    Alkaline Phosphatase 54 40 - 129 U/L    ALT 11 10 - 40 U/L    AST 16 15 - 37 U/L    Total Bilirubin 0.5 0.0 - 1.0 mg/dL    Bilirubin, Direct <0.2 0.0 - 0.3 mg/dL    Bilirubin, Indirect see below 0.0 - 1.0 mg/dL   CBC auto differential   Result Value Ref Range    WBC 4.3 4.0 - 11.0 K/uL    RBC 3.68 (L) 4.00 - 5.20 M/uL    Hemoglobin 11.3 (L) 12.0 - 16.0 g/dL    Hematocrit 34.6 (L) 36.0 - 48.0 %    MCV 94.1 80.0 - 100.0 fL    MCH 30.8 26.0 - 34.0 pg    MCHC 32.8 31.0 - 36.0 g/dL    RDW 14.0 12.4 - 15.4 %    Platelets 905 887 - 039 K/uL    MPV 8.1 5.0 - 10.5 fL    Neutrophils % 54.5 % Lymphocytes % 30.2 %    Monocytes % 11.4 %    Eosinophils % 3.0 %    Basophils % 0.9 %    Neutrophils Absolute 2.3 1.7 - 7.7 K/uL    Lymphocytes Absolute 1.3 1.0 - 5.1 K/uL    Monocytes Absolute 0.5 0.0 - 1.3 K/uL    Eosinophils Absolute 0.1 0.0 - 0.6 K/uL    Basophils Absolute 0.0 0.0 - 0.2 K/uL   Troponin   Result Value Ref Range    Troponin 0.03 (H) <0.01 ng/mL   Troponin   Result Value Ref Range    Troponin 0.03 (H) <0.01 ng/mL   Troponin   Result Value Ref Range    Troponin 0.03 (H) <0.01 ng/mL   Troponin   Result Value Ref Range    Troponin 0.03 (H) <0.01 ng/mL   Troponin   Result Value Ref Range    Troponin 0.04 (H) <0.01 ng/mL   Troponin   Result Value Ref Range    Troponin 0.04 (H) <0.01 ng/mL   Troponin   Result Value Ref Range    Troponin 0.02 (H) <0.01 ng/mL   TSH with Reflex   Result Value Ref Range    TSH 1.59 0.27 - 4.20 uIU/mL   Vitamin D 25 Hydroxy   Result Value Ref Range    Vit D, 25-Hydroxy 82.7 >=30 ng/mL   Vitamin B12 & Folate   Result Value Ref Range    Vitamin B-12 454 211 - 911 pg/mL    Folate 10.54 4.78 - 24.20 ng/mL   EKG 12 Lead   Result Value Ref Range    Ventricular Rate 76 BPM    Atrial Rate 76 BPM    P-R Interval 82 ms    QRS Duration 152 ms    Q-T Interval 432 ms    QTc Calculation (Bazett) 486 ms    P Axis 31 degrees    R Axis 57 degrees    T Axis 6 degrees    Diagnosis       Sinus rhythm with short PRRight bundle branch blockAbnormal ECGWhen compared with ECG of 25-SEP-2015 14:53,No significant change was foundConfirmed by GWEN NEVILLE MD ([id number]) on 11/2/2021 4:06:53 PM   EKG 12 Lead   Result Value Ref Range    Ventricular Rate 63 BPM    Atrial Rate 63 BPM    P-R Interval 102 ms    QRS Duration 132 ms    Q-T Interval 434 ms    QTc Calculation (Bazett) 444 ms    P Axis 75 degrees    R Axis 63 degrees    T Axis 57 degrees    Diagnosis       Sinus rhythm with short PRRight atrial enlargementRight bundle branch blockAbnormal ECGWhen compared with ECG of 02-NOV-2021 14:49,No significant change notedConfirmed by [facility] MD, Mariah 59 ([id number]) on 11/3/2021 2:20:00 PM       Diet:  ADULT DIET; Regular;  Low Sodium (2 gm)    Activity:  Activity as tolerated (Patient may move about with assist as indicated or with supervision.)    Follow-up:  in the next few days with Luis Rodriguez MD      Disposition: home    Condition: Stable      Time Spent: 35 minutes    Electronically signed by Tahddeus Sanchez MD on 11/5/2021 at 1:10 PM    Discharging Hospitalist

## 2021-11-05 NOTE — PROGRESS NOTES
Discharge instructions reviewed with patient and her sister. Reviewed follow up appointments, medications including time of next dose, purpose and side effects. Also discussed the importance of eating frequent small meals due to her hiatal hernia. All questions answered. Pt and sister both verbalized understanding. Patient taken to private vehicle via wheelchair.

## 2021-11-05 NOTE — PROGRESS NOTES
Speech Language Pathology  [facility]   Speech Therapy  Daily Dysphagia Treatment Note        [facility]  AGE: 82 y.o. GENDER: female  : 1935  [id number]  EPISODE DATE:  2021  ADMITTING DIAGNOSIS: The primary encounter diagnosis was Abdominal pain, epigastric. Diagnoses of Nausea and Elevated troponin were also pertinent to this visit. · has Malignant neoplasm of upper-outer quadrant of left breast in female, estrogen receptor positive ([address] Status post partial mastectomy of left breast; Knee laceration, left, initial encounter; Laceration of knee with complication, initial encounter; and Chest pain on their problem list.  ·  has a past medical history of Asthma, Cancer ([address] CKD (chronic kidney disease), Depression, GERD (gastroesophageal reflux disease), Neuropathy, Osteoporosis, Pernicious anemia, S/P thyroid biopsy, and Scoliosis. · Allergy to latex  ONSET DATE: 2021    Treatment Diagnosis: Dysphagia       Chart review:   Chart Review  · MD History and Physical Documentation revealed:   History Of Present Illness:     86 y. o. female presents with a history of nausea recently worked up with a ct abd with incidental finding of pneumonia for which she was placed on an oral antibiotic. She denies any sob, cough, fever, chills, alteration in taste/smell. She's had chronic bilat arm/leg discomfort, denies any worsening, but has noted worsening generalized weakness prompting ER consultation where she was found to have an elevated troponin.  She denies chest pain, palpitations, worsening jaime, leg swelling calf pain.     2021 Chest XR  Impression   No active cardiopulmonary disease          2021 MD order for Clinical Swallow Evaluation due to documented: \"complains of continued generalized weakness, fatigue, diffuse muscle aches, possibly swallowing difficulty / poor PO intake with early satiety\"     2021 Clinical Evaluation of Swallowing completed; continue diet consistencies as desired  11/5/2021 EGD in am; has resume diet orders  · Documented EGD finding:   Esophagus: The esophagus appeared normal without evidence of Harman's esophagus or reflux esophagitis. The Z line was located at 32 cm, the GE junction at 32 cm, and the hiatus at 36 cm.    Stomach: The stomach was notable for multiple benign fundic gland polyps. Otherwise gastric mucosa was normal   Hill gr IV GE junction on retroflexion.    Duodenum: The first and 2nd portions of the duodenum appeared normal with normal villous pattern  Subjective:     Current diet   regular food consistency  Thin liquids  meds as desired     Comments regarding tolerating Current Diet:   · Pt denies any new problems  · RN denies any new problems      Objective:     Pain   Patient Currently in Pain: Denies    Cognitive/Behavior   Behavior/Cognition: Alert, Cooperative, Pleasant mood. Pt was oriented to self; place ; recent am procedure; partially to full date. Pt was verbally responsive and able to follow commands. Still note some hearing and/or processing issues that intermittent mild cues warranted. Pt remains on room air      Positioning    Upright in chair    PO Trials:  · Thin Liquids: via cup/ straw: No anterior loss; no overt clinical s/s of aspiration post swallow; no pt complaints post swallow  · Soft food: Mastication appeared functional; bolus prep appeared functional. No anterior loss; no overt clinical s/s of aspiration post swallow; no pt complaints post swallow. No anterior loss or oral pocketing as pt spontaneously senses any minimal residue and clears with swallow. · Regular food: Mastication appeared functional; bolus prep appeared functional.  No anterior loss; no overt clinical s/s of aspiration post swallow; no pt complaints post swallow.  No anterior loss or oral pocketing as pt spontaneously senses any minimal residue and clears with swallow    Dysphagia Tx:   Direct Dysphagia tx: refer to po trials above  Dysphagia ex: N/A  Training in compensatory strategies: Reviewed rationale for positioning strategies; dysphagia s/s ; reinforcement of GERD precautions and lifestyle tips as pt continues to report she has reflux; and anticipated d/c of SLP for Swallowing unless otherwise notified by MD  Pt response to ex/training: Pt and spouse verbalize understanding    Goals:   Dysphagia Goals: The patient will tolerate regular food/thin liquid diet without observed clinical signs of aspiration, : goal met  The patient/caregiver will demonstrate understanding of compensatory strategies for improved swallowing safety.: goal met    Assessment:   Impressions:   11/4/2021 Initial Clinical Evaluation of Swallowing Dysphagia Diagnosis:  Potential Pharyngeal dysphagia characterized by delayed initiation of swallow. Pt demonstrated functional mastication and ;bolus prep and A-P oral transit without anterior loss or oral pocketing. Pt tolerated all presentations without overt clinical s/s of aspiration; without post swallow complaints; without post swallow voice quality changes. Plan to continue diet as desired. F/u x 1-2 for diet consistency tolerance. Pt with self reported heart/burn/GERD and on meds. GERD precautions recommended for meals and med pass       11/5/2021 SLP Dysphagia treatment session impressions  · Pt remains on room air  · Alert, Cooperative, Pleasant mood. Pt was oriented to self; place ; recent am procedure; partially to full date. Pt was verbally responsive and able to follow commands. Still note some hearing and/or processing issues that intermittent mild cues warranted. · Pt tolerated all presentations of thin liquids; soft food and regular solid food consistencies without post swallow complaints; post swallow aspiration s/s; post swallow voice quality changes or oral pocketing.   · Pt/spouse ed in dysphagia ; treatment recommendations and reinforcement of lifestyle tips due to pt with persistent complaints of s/s of reflux. Both voiced understanding. EGD results noted. Plan to continue diet as desired. Plan to d/c SLP dysphagia f/u at this time; unless otherwise notified by MD.   · Discussed with RN    Diet Recommendations:    Regular food and thin liquid consistency diet as desired unless otherwise notified by MD    meds as desired; close monitor if with H20; any problems; try with puree           Strategies:   Compensatory Swallowing Strategies: Upright as possible for all oral intake, Small bites/sips, Swallow 2 times per bite/sip, Remain upright for 30-45 minutes after meals (GERD precautions. Pt reports history of and current s/s . Pt reports on meds for heart burn/indigestion at home)    Education:  Consulted and agree with results and recommendations: Patient ; Spouse; RN  Patient Education Response: Verbalizes understanding, Needs reinforcement    Prognosis:   Good     Plan:     Continue Dysphagia Therapy:   Interventions: Therapeutic Interventions: Diet tolerance monitoring, Therapeutic PO trials with SLP, Patient/Family education  Duration/Frequency of therapy while on unit: Pt is now discontinued from SLP dysphagia  Discharge Instructions:   Do not anticipate any further skilled Speech Therapy for Dysphagia at discharge;unless otherwise indicated by MD order    This note serves as a D/C Summary in the event that this patient is discharged prior to the next therapy session. Coded treatment time: 0  Total treatment time: 20    Electronically signed by  Yung Gomes. MS Cindy,CCC,SLP [id number]  Speech and Language Pathologist  on 11/5/2021 at 1:01 PM

## 2021-11-05 NOTE — ACP (ADVANCE CARE PLANNING)
Advance Care Planning     Advance Care Planning Inpatient Note  [facility] Department    Today's Date: 11/5/2021  Unit: [facility] 5W PROGRESSIVE CARE    Received request from [employer]. Upon review of chart and communication with care team, patient's decision making abilities are not in question. . Patient, Spouse and sister was/were present in the room during visit. Goals of ACP Conversation:  Discuss advance care planning documents    Health Care Decision Makers:       Primary Decision Maker: Maxwell Costa Spouse - [phone]    Secondary Decision Maker: Rhoda Melgar Child - [phone]    Summary:  Verified Documents    Advance Care Planning Documents (Patient Wishes):  Healthcare Power of /Advance Directive Appointment of Health Care Agent     Assessment:  Completed HCPOA and LW with pt but then realized pt has a completed [employer] HCPOA on file in [facility] Per pt it needs renewed. After discussion with pt and her family, pt will discuss with her Bahai leaders on which documents to use according to per personal and Islam wishes. As well, the hospital will not scan in completed HCPOA and LW documents completed today. Pt agreed to give her doctor and/or [facility] ([facility]) a copy of the completed AD docs she wishes to use. Interventions:  Provided education on documents for clarity and greater understanding  Discussed and provided education on state decision maker hierarchy  Assisted in the completion of documents according to patient's wishes at this time  Encouraged ongoing ACP conversation with future decision makers and loved ones  Pt to discuss with family and Islam leaders    Care Preferences Communicated:   No    Outcomes/Plan:  ACP Discussion: Postponed  New advance directive completed. Existing advance directive reviewed with patient; no changes to patient's previously recorded wishes.   Returned original document(s) to patient, as well as copies for distribution to appointed agents  Pt to follow-up    Electronically signed by Marry Antunez, [address] [place] [address] on 11/5/2021 at 3:36 PM

## 2021-11-05 NOTE — ANESTHESIA PRE PROCEDURE
Department of Anesthesiology  Preprocedure Note       Name:  Juice Melgar   Age:  82 y.o.  :  1935                                          MRN:  [id number]         Date:  2021      Surgeon: Stephanie Griffin):  Raul Fish MD    Procedure: Procedure(s):  ESOPHAGOGASTRODUODENOSCOPY    Medications prior to admission:   Prior to Admission medications    Medication Sig Start Date End Date Taking? Authorizing Provider   calcium citrate-vitamin D (CITRICAL + D) 315-250 MG-UNIT TABS per tablet Take 1 tablet by mouth every morning (before breakfast)   Yes Historical Provider, MD   gabapentin (NEURONTIN) 100 MG capsule Take 200 mg by mouth every evening. Yes Historical Provider, MD   polyethylene glycol (GLYCOLAX) 17 g packet Take 17 g by mouth 2 times daily   Yes Historical Provider, MD   omeprazole (PRILOSEC) 20 MG delayed release capsule Take 20 mg by mouth daily   Yes Historical Provider, MD   anastrozole (ARIMIDEX) 1 MG tablet Take 1 mg by mouth every evening    Yes Historical Provider, MD   albuterol sulfate  (90 Base) MCG/ACT inhaler Inhale 2 puffs into the lungs every 6 hours as needed for Wheezing   Yes Historical Provider, MD   fluticasone-salmeterol (ADVAIR HFA) 115-21 MCG/ACT inhaler Inhale 2 puffs into the lungs 2 times daily   Yes Historical Provider, MD   Ergocalciferol (VITAMIN D2 PO) Take 50,000 Units by mouth Twice a Week Twice weekly    Yes Historical Provider, MD   clonazePAM (KLONOPIN) 0.5 MG tablet Take 0.5 mg by mouth 2 times daily.     Yes Historical Provider, MD   mirtazapine (REMERON) 15 MG tablet TAKE 1 TABLET BY MOUTH AT BEDTIME. 10/5/21   Historical Provider, MD   alendronate (FOSAMAX) 70 MG tablet Take 70 mg by mouth every 7 days    Historical Provider, MD   venlafaxine (EFFEXOR XR) 150 MG extended release capsule Take 150 mg by mouth every evening     Historical Provider, MD       Current medications:    Current Facility-Administered Medications   Medication Dose Route Frequency Provider Last Rate Last Admin    0.9 % sodium chloride infusion   IntraVENous Continuous Hang Severino MD 75 mL/hr at 11/05/21 0823 New Bag at 11/05/21 0823    sodium chloride flush 0.9 % injection 5-40 mL  5-40 mL IntraVENous 2 times per day Hang Severino MD        sodium chloride flush 0.9 % injection 5-40 mL  5-40 mL IntraVENous PRN Hang Severino MD        0.9 % sodium chloride infusion  25 mL IntraVENous PRN Hang Severino MD        polyethylene glycol [facility]) packet 17 g  17 g Oral Daily COLBY Ramirez        albuterol (PROVENTIL) nebulizer solution 2.5 mg  2.5 mg Nebulization Q6H PRN Aliyah Negro MD        anastrozole (ARIMIDEX) tablet 1 mg  1 mg Oral Nightly Aliyah Negro MD   1 mg at 11/04/21 2025    ondansetron (ZOFRAN) injection 4 mg  4 mg IntraVENous Once Aliyah Negro MD        Calcium Carb-Cholecalciferol 250-125 MG-UNIT TABS 250 mg  1 tablet Oral BID WC Aliyah Negro MD   250 mg at 11/04/21 0962    clonazePAM (KLONOPIN) tablet 0.5 mg  0.5 mg Oral BID PRN Aliyah Negro MD   0.5 mg at 11/04/21 2025    budesonide-formoterol (SYMBICORT) 160-4.5 MCG/ACT inhaler 2 puff  2 puff Inhalation BID Aliyah Negro MD   2 puff at 11/05/21 0751    gabapentin (NEURONTIN) capsule 200 mg  200 mg Oral Nightly Aliyah Negro MD   200 mg at 11/04/21 2025    pantoprazole (PROTONIX) tablet 40 mg  40 mg Oral QAM AC Aliyah Negro MD   40 mg at 11/04/21 0641    venlafaxine (EFFEXOR XR) extended release capsule 150 mg  150 mg Oral Daily Aliyah Negro MD   150 mg at 11/04/21 0910    sodium chloride flush 0.9 % injection 5-40 mL  5-40 mL IntraVENous 2 times per day Aliyah Negro MD   10 mL at 11/05/21 0821    sodium chloride flush 0.9 % injection 5-40 mL  5-40 mL IntraVENous PRN Aliyah Negro MD        0.9 % sodium chloride infusion  25 mL IntraVENous PRN Aliyah Negro MD        enoxaparin (LOVENOX) injection 40 mg  40 mg SubCUTAneous Daily Aliyah Negro MD 40 mg at 21 0910    ondansetron (ZOFRAN-ODT) disintegrating tablet 4 mg  4 mg Oral Q8H PRN Pancho Baugh MD   4 mg at 21 4887    Or    ondansetron (ZOFRAN) injection 4 mg  4 mg IntraVENous Q6H PRN Pancho Baugh MD   4 mg at 21 1503    acetaminophen (TYLENOL) tablet 650 mg  650 mg Oral Q6H PRN Pancho Baugh MD   650 mg at 21 1734    Or    acetaminophen (TYLENOL) suppository 650 mg  650 mg Rectal Q6H PRN Pancho Baugh MD           Allergies: Allergies   Allergen Reactions    Latex Hives and Itching       Problem List:    Patient Active Problem List   Diagnosis Code    Malignant neoplasm of upper-outer quadrant of left breast in female, estrogen receptor positive ([facility] [address] C50.412, Z17.0    Status post partial mastectomy of left breast Z90.12    Knee laceration, left, initial encounter S81.012A    Laceration of knee with complication, initial encounter S81.019A    Chest pain R07.9    Severe malnutrition ([facility] [address] E43       Past Medical History:        Diagnosis Date    Asthma     Cancer ([facility] [address]     Breast cancer on left side     CKD (chronic kidney disease)     Depression     GERD (gastroesophageal reflux disease)     Neuropathy     Osteoporosis     Pernicious anemia     S/P thyroid biopsy     Scoliosis        Past Surgical History:        Procedure Laterality Date     SECTION       SECTION      x`s    KNEE SURGERY Left 2020    INCISION AND DRAINAGE LEFT KNEE performed by Keyanna Bruce MD at [place] [address]  2021    US THYROID BIOPSY 2021 [facility] ULTRASOUND       Social History:    Social History     Tobacco Use    Smoking status: Never Smoker    Smokeless tobacco: Never Used   Substance Use Topics    Alcohol use:  No                                Counseling given: Not Answered      Vital Signs (Current):   Vitals:    21 2332 21 0355 21 0752 21 0802   BP: (!) 143/79 (!) 144/72  132/62   Pulse: 71 73 60   Resp: 16 16  18   Temp: 98 °F (36.7 °C) 97.7 °F (36.5 °C)  97.7 °F (36.5 °C)   TempSrc: Oral Oral  Oral   SpO2: 93% 92% 92%    Weight:  134 lb 11.2 oz (61.1 kg)     Height:                                                  BP Readings from Last 3 Encounters:   11/05/21 132/62   07/14/21 135/65   08/08/20 (!) 151/78       NPO Status: Time of last liquid consumption: 2330                        Time of last solid consumption: 1800                        Date of last liquid consumption: 11/04/21                        Date of last solid food consumption: 11/04/21    BMI:   Wt Readings from Last 3 Encounters:   11/05/21 134 lb 11.2 oz (61.1 kg)   07/14/21 149 lb (67.6 kg)   08/21/20 156 lb (70.8 kg)     Body mass index is 23.12 kg/m². CBC:   Lab Results   Component Value Date    WBC 4.3 11/03/2021    RBC 3.68 11/03/2021    HGB 11.3 11/03/2021    HCT 34.6 11/03/2021    MCV 94.1 11/03/2021    RDW 14.0 11/03/2021     11/03/2021       CMP:   Lab Results   Component Value Date     11/03/2021    K 3.7 11/03/2021     11/03/2021    CO2 24 11/03/2021    BUN 18 11/03/2021    CREATININE 0.9 11/03/2021    GFRAA >60 11/03/2021    AGRATIO 1.6 11/02/2021    LABGLOM 60 11/03/2021    GLUCOSE 93 11/03/2021    PROT 5.6 11/03/2021    CALCIUM 10.0 11/03/2021    BILITOT 0.5 11/03/2021    ALKPHOS 54 11/03/2021    AST 16 11/03/2021    ALT 11 11/03/2021       POC Tests: No results for input(s): POCGLU, POCNA, POCK, POCCL, POCBUN, POCHEMO, POCHCT in the last 72 hours.     Coags:   Lab Results   Component Value Date    PROTIME 10.9 07/31/2020    INR 0.94 07/31/2020    APTT 31.5 07/31/2020       HCG (If Applicable): No results found for: PREGTESTUR, PREGSERUM, HCG, HCGQUANT     ABGs: No results found for: PHART, PO2ART, XLZ4BYR, SCV5QPT, BEART, B8KAEOYY     Type & Screen (If Applicable):  No results found for: LABABO, LABRH    Drug/Infectious Status (If Applicable):  No results found for: HIV, HEPCAB    COVID-19 Screening (If Applicable):   Lab Results   Component Value Date    COVID19 Not Detected 11/04/2021           Anesthesia Evaluation  Patient summary reviewed no history of anesthetic complications:   Airway: Mallampati: II  TM distance: >3 FB   Neck ROM: full  Mouth opening: > = 3 FB Dental:      Comment: No loose teeth    Pulmonary: breath sounds clear to auscultation  (+) asthma:     (-) shortness of breath, recent URI and sleep apnea                           Cardiovascular:        (-) hypertension, valvular problems/murmurs, past MI, CAD, CABG/stent and no pulmonary hypertension    ECG reviewed  Rhythm: regular  Rate: normal  Echocardiogram reviewed    Cleared by cardiology              Neuro/Psych:   (+) neuromuscular disease:, psychiatric history:depression/anxiety    (-) TIA and CVA           GI/Hepatic/Renal:   (+) GERD:, renal disease: CRI,           Endo/Other:    (+) malignancy/cancer. (-) diabetes mellitus, hypothyroidism, hyperthyroidism               Abdominal:             Vascular: Other Findings:           Anesthesia Plan      MAC     ASA 3       Induction: intravenous. Anesthetic plan and risks discussed with patient. Plan discussed with CRNA. This pre-anesthesia assessment may be used as a history and physical.    DOS STAFF ADDENDUM:    Pt seen and examined, chart reviewed (including anesthesia, drug and allergy history). No interval changes to history and physical examination. Anesthetic plan, risks, benefits, alternatives, and personnel involved discussed with patient. Patient verbalized an understanding and agrees to proceed.       Jt Cadena MD  November 5, 2021  8:39 AM      Jt Cadena MD   11/5/2021

## 2021-11-05 NOTE — PLAN OF CARE
Problem: OXYGENATION/RESPIRATORY FUNCTION  Goal: Patient will maintain patent airway  Outcome: Ongoing  Note: Patient's airway is patent. No cough noted. Problem: OXYGENATION/RESPIRATORY FUNCTION  Goal: Patient will achieve/maintain normal respiratory rate/effort  Description: Respiratory rate and effort will be within normal limits for the patient  Outcome: Ongoing  Note: Respirations easy even no distress. No shortness of breath noted. Problem: HEMODYNAMIC STATUS  Goal: Patient has stable vital signs and fluid balance  Outcome: Ongoing  Note: VSS. Labs being monitored. I/O being monitored. Weights checked daily. Problem: FLUID AND ELECTROLYTE IMBALANCE  Goal: Fluid and electrolyte balance are achieved/maintained  11/5/2021 1350 by Karen Blackwell RN  Outcome: Ongoing  Note: Labs being monitored. Problem: ACTIVITY INTOLERANCE/IMPAIRED MOBILITY  Goal: Mobility/activity is maintained at optimum level for patient  Outcome: Ongoing  Note: Pt has been up in room ambulating with stand by assist. Gait steady. Problem: Falls - Risk of:  Goal: Will remain free from falls  Description: Will remain free from falls  11/5/2021 1350 by Karen Blackwell RN  Outcome: Ongoing  Note: Fall risk assessment completed every shift. All precautions in place. Pt has call light within reach at all times. Room clear of clutter. Pt aware to call for assistance when getting up. Problem: Falls - Risk of:  Goal: Absence of physical injury  Description: Absence of physical injury  Outcome: Ongoing  Note: No signs of physical injury. Problem: Pain:  Description: Pain management should include both nonpharmacologic and pharmacologic interventions. Goal: Pain level will decrease  Description: Pain level will decrease  Outcome: Ongoing  Note: Pt has offered no complaints of pain. Pt has shown no outward signs of pain.      Problem: Pain:  Description: Pain management should include both nonpharmacologic and pharmacologic interventions. Goal: Control of acute pain  Description: Control of acute pain  Outcome: Ongoing  Note: Pain/discomfort being managed with PRN analgesics per MD orders. Pt able to express presence and absence of pain and rate pain appropriately using numerical scale. Problem: Pain:  Description: Pain management should include both nonpharmacologic and pharmacologic interventions. Goal: Control of chronic pain  Description: Control of chronic pain  Outcome: Ongoing  Note: No complaints of pain. Problem: Skin Integrity:  Goal: Will show no infection signs and symptoms  Description: Will show no infection signs and symptoms  Outcome: Ongoing  Note: Skin assessment completed every shift. Pt assessed for incontinence, appropriate barrier cream applied prn. Pt encouraged to turn/rotate every 2 hours. Assistance provided if pt unable to do so themselves. Problem: Skin Integrity:  Goal: Absence of new skin breakdown  Description: Absence of new skin breakdown  Outcome: Ongoing  Note: No signs of skin breakdown. Problem: Nutrition  Goal: Optimal nutrition therapy  Outcome: Ongoing  Note: Appetite is fair. Pt did have an EGD this morning. Pt has had no complaints of nausea.

## 2021-11-05 NOTE — ANESTHESIA POSTPROCEDURE EVALUATION
Department of Anesthesiology  Postprocedure Note    Patient: Rashid Scott  MRN: [id number]  YOB: 1935  Date of evaluation: 11/5/2021  Time:  11:19 AM     Procedure Summary     Date: 11/05/21 Room / Location: [address] [place] [address]    Anesthesia Start: 7043 Anesthesia Stop: [address]    Procedure: EGD BIOPSY (N/A ) Diagnosis:       Weight loss      Nausea      (WEIGHT LOSS, NAUSEA)    Surgeons: Nancy Espinoza MD Responsible Provider: Kendell Byrd MD    Anesthesia Type: MAC ASA Status: 3          Anesthesia Type: MAC    Emerald Phase I: Emerald Score: 10    Emerald Phase II:      Last vitals: Reviewed and per EMR flowsheets.        Anesthesia Post Evaluation    Patient location during evaluation: PACU  Patient participation: complete - patient participated  Level of consciousness: awake and alert  Airway patency: patent  Nausea & Vomiting: no nausea and no vomiting  Complications: no  Cardiovascular status: hemodynamically stable  Respiratory status: acceptable  Hydration status: stable Daily Assessment

## 2021-11-05 NOTE — H&P
Pre-operative History and Physical    Patient: Anthony Lara  : 1935  Acct#:     Intended Procedure:  EGD    HISTORY OF PRESENT ILLNESS:  The patient is a 82 y.o. female  who presents for/due to weight loss and dyspepsia       Past Medical History:        Diagnosis Date    Asthma     Cancer ([address]     Breast cancer on left side     CKD (chronic kidney disease)     Depression     GERD (gastroesophageal reflux disease)     Neuropathy     Osteoporosis     Pernicious anemia     S/P thyroid biopsy     Scoliosis      Past Surgical History:        Procedure Laterality Date     SECTION       SECTION      x`s    KNEE SURGERY Left 2020    INCISION AND DRAINAGE LEFT KNEE performed by Silverio Cortes MD at [facility] [address]  2021     THYROID BIOPSY 2021 [facility]TRASOUND     Medications Prior to Admission:   No current facility-administered medications on file prior to encounter. Current Outpatient Medications on File Prior to Encounter   Medication Sig Dispense Refill    calcium citrate-vitamin D (CITRICAL + D) 315-250 MG-UNIT TABS per tablet Take 1 tablet by mouth every morning (before breakfast)      gabapentin (NEURONTIN) 100 MG capsule Take 200 mg by mouth every evening.  polyethylene glycol (GLYCOLAX) 17 g packet Take 17 g by mouth 2 times daily      omeprazole (PRILOSEC) 20 MG delayed release capsule Take 20 mg by mouth daily      anastrozole (ARIMIDEX) 1 MG tablet Take 1 mg by mouth every evening       albuterol sulfate  (90 Base) MCG/ACT inhaler Inhale 2 puffs into the lungs every 6 hours as needed for Wheezing      fluticasone-salmeterol (ADVAIR HFA) 115-21 MCG/ACT inhaler Inhale 2 puffs into the lungs 2 times daily      Ergocalciferol (VITAMIN D2 PO) Take 50,000 Units by mouth Twice a Week Twice weekly       clonazePAM (KLONOPIN) 0.5 MG tablet Take 0.5 mg by mouth 2 times daily.        mirtazapine (REMERON) 15 MG tablet TAKE 1 TABLET BY MOUTH AT BEDTIME.  alendronate (FOSAMAX) 70 MG tablet Take 70 mg by mouth every 7 days      venlafaxine (EFFEXOR XR) 150 MG extended release capsule Take 150 mg by mouth every evening           Allergies:  Latex    Social History:   TOBACCO:   reports that she has never smoked. She has never used smokeless tobacco.  ETOH:   reports no history of alcohol use. DRUGS:   reports no history of drug use. PHYSICAL EXAM:      Vital Signs: /69   Pulse 57   Temp 97 °F (36.1 °C) (Temporal)   Resp 16   Ht 5' 4\" (1.626 m)   Wt 134 lb 11.2 oz (61.1 kg)   SpO2 93%   BMI 23.12 kg/m²    Airway: No stridor or wheezing noted. Good air movement  Pulmonary: without wheezes. Clear to auscultation  Cardiac:regular rate and rhythm without loud murmurs  Abdomen:soft, nontender,  Bowel sounds present    Pre-Procedure Assessment / Plan:  1) Weight loss   2) Dyspepsia     ASA Grade:  ASA 2 - Patient with mild systemic disease with no functional limitations  Mallampati Classification:  Class II    Level of Sedation Plan:Deep sedation    Post Procedure plan: Return to same level of care    I assessed the patient and find that the patient is in satisfactory condition to proceed with the planned procedure and sedation plan. I have explained the risk, benefits, and alternatives to the procedure; the patient understands and agrees to proceed. The patient was counseled at length about the risks of anita Covid-19 during their perioperative period and any recovery window from their procedure. The patient was made aware that anita Covid-19  may worsen their prognosis for recovering from their procedure  and lend to a higher morbidity and/or mortality risk. All material risks, benefits, and reasonable alternatives including postponing the procedure were discussed. The patient does wish to proceed with the procedure at this time.       Sharifa Zavala MD  11/5/2021

## 2021-11-05 NOTE — OP NOTE
Endoscopy Note    Patient: Yolanda Rivera  : 1935  Acct#:     Procedure: Esophagogastroduodenoscopy with biopsy                         Date:  2021     Surgeon:   Melany Welsh MD    Referring Physician:  Harika Patino MD    Indications: This is a 82y.o. year old female who presents today with New-onset dyspepsia and Unexplained weight loss. Postoperative Diagnosis:    Multiple fundic gland polyps   4 cm sliding hiatal hernia   Normal appearing small bowel   Otherwise normal esophagus     Anesthesia:  Administered by the anesthesia service during MAC     Consent:  The patient or their legal guardian has signed an informed consent, and is aware of the potential risks, benefits, alternatives, and potential complications of this procedure. These include, but are not limited to hemorrhage, bleeding, post procedural pain, perforation, phlebitis, aspiration, hypotension, hypoxia, cardiovascular events such as arryhthmia, and possibly death. Description of Procedure: The patient was then taken to the endoscopy suite, placed in the left lateral decubitus position and the above IV sedation was administrered. The Olympus video endoscope was placed through the patient's oropharynx without difficulty to the extent of the 2nd portion of the duodenum. Both forward and retroflexed views of the stomach were obtained. Findings:    Esophagus: The esophagus appeared normal without evidence of Harman's esophagus or reflux esophagitis. The Z line was located at 32 cm, the GE junction at 32 cm, and the hiatus at 36 cm. Stomach: The stomach was notable for multiple benign fundic gland polyps. Otherwise gastric mucosa was normal   Hill gr IV GE junction on retroflexion.      Duodenum: The first and 2nd portions of the duodenum appeared normal with normal villous pattern    The scope was then withdrawn back into the stomach, it was decompressed, and the scope was completely withdrawn. The patient tolerated the procedure well and was taken to the post anesthesia care unit in good condition. Estimated blood loss: None    ID Type Source Tests Collected by Time Destination   A : gastric biopsy evaluate for h pylori Gastric Gastric SURGICAL PATHOLOGY Raul Fish MD 11/5/2021 0677            Impression:   1) See post procedure diagnoses    Recommendations:   -Diet as tolerated   -Return to inpatient floor   -Will follow up on biopsies and treat with quadruple therapy if positive for H pylori.   -No further inpatient GI workup is needed. We will sign off. The patient can discuss with her PCP the risks/benefits of pursuing colon cancer screening at her age. Suspect her weight loss has been likely due to her significant dietary changes over the past 6 months.      Raul Fish MD  [address] [facility]

## 2021-11-05 NOTE — DISCHARGE INSTR - COC
Continuity of Care Form    Patient Name: aCrrie Mackey   :  1935  MRN:  [id number]    Admit date:  2021  Discharge date:  ***    Code Status Order: Full Code   Advance Directives:   Advance Care Flowsheet Documentation     Date/Time Healthcare Directive Type of Healthcare Directive Copy in [address] Agent's Name Healthcare Agent's Phone Number    21 2382  No, patient does not have an advance directive for healthcare treatment  --  --  --  --  --          Admitting Physician:  Joshua Oglebsy MD  PCP: Elliot Ace MD    Discharging Nurse: [facility] Unit/Room#: G3Y-7283/7070-74  Discharging Unit Phone Number: ***    Emergency Contact:   Extended Emergency Contact Information  Primary Emergency Contact: Jane PERKINS  Address[facility] [place] [facility]", [address] [place] [address] Phone: [phone]  Relation: Spouse  Secondary Emergency Contact: TIA Candelario [address] Phone: [phone]  Work Phone: [phone]  Relation: Child    Past Surgical History:  Past Surgical History:   Procedure Laterality Date     SECTION       SECTION      x`s    KNEE SURGERY Left 2020    INCISION AND DRAINAGE LEFT KNEE performed by Nina Haider MD at [facility] 2021    EGD BIOPSY performed by Bella Navas MD at [address] [place] [address]  2021    [address] [place] [address],[address] [place] [address]       Immunization History:   Immunization History   Administered Date(s) Administered    COVID-19, Castro Peter, PF, 30mcg/0.3mL 04/15/2021, 2021    Tdap (Boostrix, Adacel) 2015, 2020       Active Problems:  Patient Active Problem List   Diagnosis Code    Malignant neoplasm of upper-outer quadrant of left breast in female, estrogen receptor positive ([facility] [address] C50.412, Z17.0    Status post partial mastectomy of left breast Z90.12    Knee laceration, left, initial encounter S81.012A    Laceration of knee with complication, initial encounter S81.019A    Chest pain R07.9    Severe malnutrition (HCC) E43       Isolation/Infection:   Isolation          No Isolation        Patient Infection Status     Infection Onset Added Last Indicated Last Indicated By Review Planned Expiration Resolved Resolved By    None active    Resolved    COVID-19 Rule Out 11/04/21 11/04/21 11/04/21 COVID-19, Rapid (Ordered)   11/04/21 Rule-Out Test Resulted    COVID-19 Rule Out 07/31/20 07/31/20 07/31/20 COVID-19 (Ordered)   07/31/20 Rule-Out Test Resulted          Nurse Assessment:  Last Vital Signs: /67   Pulse 62   Temp 98.4 °F (36.9 °C) (Oral)   Resp 18   Ht 5' 4\" (1.626 m)   Wt 134 lb 11.2 oz (61.1 kg)   SpO2 95%   BMI 23.12 kg/m²     Last documented pain score (0-10 scale): Pain Level: 0  Last Weight:   Wt Readings from Last 1 Encounters:   11/05/21 134 lb 11.2 oz (61.1 kg)     Mental Status:  {IP PT MENTAL STATUS:20030}    IV Access:  { KARISSA IV ACCESS:684045897}    Nursing Mobility/ADLs:  Walking   {P DME CYJC:698069783}  Transfer  {P DME EYUL:612354808}  Bathing  {P DME CZDO:501454058}  Dressing  {P DME LFKQ:340223347}  Toileting  {P DME LGMV:625427796}  Feeding  {P DME UINW:155302372}  Med Admin  {P DME DJWB:921333104}  Med Delivery   { KARISSA MED Delivery:518422763}    Wound Care Documentation and Therapy:        Elimination:  Continence:   · Bowel: {YES / SI:48302}  · Bladder: {YES / PB:05893}  Urinary Catheter: {Urinary Catheter:440384135}   Colostomy/Ileostomy/Ileal Conduit: {YES / PE:29206}       Date of Last BM: ***    Intake/Output Summary (Last 24 hours) at 11/5/2021 1410  Last data filed at 11/5/2021 1309  Gross per 24 hour   Intake 736.11 ml   Output 600 ml   Net 136.11 ml     I/O last 3 completed shifts:   In: 56 [P.O.:890]  Out: 225 [Urine:225]    Safety Concerns:     [address] KARISSA Safety Concerns:733231825}    Impairments/Disabilities:      [address] HANCOCK Impairments/Disabilities:641966566}    Nutrition Therapy:  Current Nutrition Therapy:   [address] KARISSA Diet List:309904345}    Routes of Feeding: {CHP DME Other Feedings:631563735}  Liquids: {Slp liquid thickness:46928}  Daily Fluid Restriction: {CHP DME Yes amt example:114853560}  Last Modified Barium Swallow with Video (Video Swallowing Test): {Done Not Done OYI}    Treatments at the Time of Hospital Discharge:   Respiratory Treatments: ***  Oxygen Therapy:  {Therapy; copd oxygen:38521}  Ventilator:    { CC Vent DDLM:280432671}    Rehab Therapies: {THERAPEUTIC INTERVENTION:3310300729}  Weight Bearing Status/Restrictions: [address]  Weight Bearin}  Other Medical Equipment (for information only, NOT a DME order):  {EQUIPMENT:896987986}  Other Treatments: ***    Patient's personal belongings (please select all that are sent with patient):  {P DME Belongings:561505608}    RN SIGNATURE:  {Esignature:690656081}    CASE MANAGEMENT/SOCIAL WORK SECTION    Inpatient Status Date: ***    Readmission Risk Assessment Score:  Readmission Risk              Risk of Unplanned Readmission:  13           Discharging to Facility/ Agency   · Name:   · Address:  · Phone:  · Fax:    Dialysis Facility (if applicable)   · Name:  · Address:  · Dialysis Schedule:  · Phone:  · Fax:    / signature: {Esignature:179396208}    PHYSICIAN SECTION    Prognosis: {Prognosis:3970573186}    Condition at Discharge: [address] Patient Condition:293146592}    Rehab Potential (if transferring to Rehab): {Prognosis:0240579879}    Recommended Labs or Other Treatments After Discharge: ***    Physician Certification: I certify the above information and transfer of Cristi Schilling  is necessary for the continuing treatment of the diagnosis listed and that she requires {Admit to Appropriate Level of Care:17912} for {GREATER/LESS:783183839} 30 days.      Update Admission

## 2021-11-05 NOTE — CARE COORDINATION
Discharge Planning:  SW met with pt to discuss d/c plans. Pt confirmed that she plans to return home upon d/c and denied any d/c needs. Pt stated that she does not feels that she will need any HC and stated that her sister will transport her home at d/c.  Plan: Pt plans to return home at d/c  Sister to transport pt home. Pt denied any additional needs at this time.    Kay Leblanc [place]  [phone]  Electronically signed by Jessika Alcocer on 11/5/2021 at 11:39 AM

## 2021-11-05 NOTE — PROGRESS NOTES
Physician Progress Note      Angel Og  CSN #:                  [id number]  :                       1935  ADMIT DATE:       2021 2:57 PM  [address] [place] DATE:  RESPONDING  PROVIDER #:        Paul Nieto MD          QUERY TEXT:    Dear Dr Max Juarez,    Pt admitted with chest pain. Noted documentation of severe malnutrition on   21 2:30pm in Dietician consult note. If possible, please document in   progress notes and discharge summary:    The medical record reflects the following:  Risk Factors: HX- Breast CA, CKD, GERD Anemia. Clinical Indicators: Per dietary consult note on 21-Pt with decreased   intake, usually eating one meal per day. Also with chronic nausea for last   several months. Wt trending down, 149 lb to 134 lb over 4   months. Milli Julien Malnutrition Status:  Severe malnutrition. Milli Julien Nutrition Diagnosis:  ? Severe malnutrition related to inadequate protein-energy intake as evidenced   by poor intake prior to admission, weight loss greater than or equal to 10%   in 6 months, mild loss of subcutaneous fat, moderate muscle loss (x 4 months)  Treatment: Dietary consult,  Continue Current Diet, Start Oral Nutrition   Supplement, Continue to monitor while inpatient    Thank Shalonda Block RN BSN CDS CRCR  [email or web address]  Options provided:  -- Severe malnutrition confirmed present on admission  -- Severe malnutrition ruled out  -- Defer to dietary consultant documentation regarding severe malnutrition  -- Other - I will add my own diagnosis  -- Disagree - Not applicable / Not valid  -- Disagree - Clinically unable to determine / Unknown  -- Refer to Clinical Documentation Reviewer    PROVIDER RESPONSE TEXT:    I defer to dietary consultant regarding documentation of severe malnutrition.     Query created by: Johnny Howard on 2021 7:22 AM      Electronically signed by:  Paul Nieto MD 2021 10:22 AM

## 2021-11-05 NOTE — PLAN OF CARE
Problem: FLUID AND ELECTROLYTE IMBALANCE  Goal: Fluid and electrolyte balance are achieved/maintained  Outcome: Ongoing  Note: Educated patient/family on CHF signs/ symptoms, causes, treatments, limited fluid intake, daily weights, low sodium diet, and follow-up. Pt to call attending physician if weight gain of 3 pounds in one day or 5 pounds in one week. Problem: Falls - Risk of:  Goal: Will remain free from falls  Description: Will remain free from falls  Outcome: Ongoing  Note: Fall risk assessment completed every shift. All precautions in place. Pt has call light within reach at all times. Room clear of clutter. Pt aware to call for assistance when getting up. Patient assessed for fall risk; fall precautions initiated. Patient and family instructed about safety devices. Environment kept free of clutter and adequate lighting provided. Bed locked and in lowest position. Call light within reach. Will continue to monitor.       Problem: Nutrition  Goal: Optimal nutrition therapy  11/4/2021 1433 by Wayne Olivares RD, LD  Outcome: Ongoing

## 2021-11-23 ENCOUNTER — APPOINTMENT (OUTPATIENT)
Dept: GENERAL RADIOLOGY | Age: 86
End: 2021-11-23
Payer: MEDICARE

## 2021-11-23 ENCOUNTER — HOSPITAL ENCOUNTER (EMERGENCY)
Age: 86
Discharge: HOME | End: 2021-11-23
Attending: EMERGENCY MEDICINE
Payer: MEDICARE

## 2021-11-23 VITALS
OXYGEN SATURATION: 98 % | WEIGHT: 132 LBS | HEART RATE: 93 BPM | BODY MASS INDEX: 22.53 KG/M2 | SYSTOLIC BLOOD PRESSURE: 181 MMHG | TEMPERATURE: 99.3 F | HEIGHT: 64 IN | DIASTOLIC BLOOD PRESSURE: 81 MMHG | RESPIRATION RATE: 18 BRPM

## 2021-11-23 DIAGNOSIS — U07.1 COVID-19: Primary | ICD-10-CM

## 2021-11-23 DIAGNOSIS — J45.901 EXACERBATION OF ASTHMA, UNSPECIFIED ASTHMA SEVERITY, UNSPECIFIED WHETHER PERSISTENT: ICD-10-CM

## 2021-11-23 PROCEDURE — 6370000000 HC RX 637 (ALT 250 FOR IP): Performed by: EMERGENCY MEDICINE

## 2021-11-23 PROCEDURE — 94640 AIRWAY INHALATION TREATMENT: CPT

## 2021-11-23 PROCEDURE — 99283 EMERGENCY DEPT VISIT LOW MDM: CPT

## 2021-11-23 PROCEDURE — 94761 N-INVAS EAR/PLS OXIMETRY MLT: CPT

## 2021-11-23 PROCEDURE — 71045 X-RAY EXAM CHEST 1 VIEW: CPT

## 2021-11-23 RX ORDER — PREDNISONE 20 MG/1
60 TABLET ORAL ONCE
Status: COMPLETED | OUTPATIENT
Start: 2021-11-23 | End: 2021-11-23

## 2021-11-23 RX ORDER — PREDNISONE 10 MG/1
60 TABLET ORAL DAILY
Qty: 24 TABLET | Refills: 0 | Status: SHIPPED | OUTPATIENT
Start: 2021-11-23 | End: 2021-11-27

## 2021-11-23 RX ORDER — IPRATROPIUM BROMIDE AND ALBUTEROL SULFATE 2.5; .5 MG/3ML; MG/3ML
1 SOLUTION RESPIRATORY (INHALATION) ONCE
Status: COMPLETED | OUTPATIENT
Start: 2021-11-23 | End: 2021-11-23

## 2021-11-23 RX ADMIN — IPRATROPIUM BROMIDE AND ALBUTEROL SULFATE 1 AMPULE: .5; 2.5 SOLUTION RESPIRATORY (INHALATION) at 12:39

## 2021-11-23 RX ADMIN — PREDNISONE 60 MG: 20 TABLET ORAL at 12:34

## 2021-11-23 NOTE — ED PROVIDER NOTES
Emergency Physician Note        Note Open Time: 1:02 PM EST    Chief Complaint  Positive For Covid-19 (positive on 11/16. +headache +cough. )       History of Present Illness  Corey Freitas is a 82 y.o. female who presents to the ED for Covid. Patient reports that she is positive for Covid and has been so for about a week. Several family members tested positive as well. Patient is fully vaccinated and had Regeneron treatment recently. She is here just because of the cough and feeling somewhat short of breath. She is no longer having any fevers. She denies any chest pain. Patient denies any vomiting or diarrhea. She does have a cough. She has history of asthma. 10 systems reviewed, pertinent positives per HPI otherwise noted to be negative    I have reviewed the following from the nursing documentation:      Prior to Admission medications    Medication Sig Start Date End Date Taking? Authorizing Provider   mirtazapine (REMERON) 15 MG tablet TAKE 1 TABLET BY MOUTH AT BEDTIME. 10/5/21   Historical Provider, MD   calcium citrate-vitamin D (CITRICAL + D) 315-250 MG-UNIT TABS per tablet Take 1 tablet by mouth every morning (before breakfast)    Historical Provider, MD   gabapentin (NEURONTIN) 100 MG capsule Take 200 mg by mouth every evening.     Historical Provider, MD   polyethylene glycol (GLYCOLAX) 17 g packet Take 17 g by mouth 2 times daily    Historical Provider, MD   omeprazole (PRILOSEC) 20 MG delayed release capsule Take 20 mg by mouth daily    Historical Provider, MD   anastrozole (ARIMIDEX) 1 MG tablet Take 1 mg by mouth every evening     Historical Provider, MD   albuterol sulfate  (90 Base) MCG/ACT inhaler Inhale 2 puffs into the lungs every 6 hours as needed for Wheezing    Historical Provider, MD   fluticasone-salmeterol (ADVAIR HFA) 115-21 MCG/ACT inhaler Inhale 2 puffs into the lungs 2 times daily    Historical Provider, MD   Ergocalciferol (VITAMIN D2 PO) Take 50,000 Units by mouth Twice a Week Twice weekly     Historical Provider, MD   alendronate (FOSAMAX) 70 MG tablet Take 70 mg by mouth every 7 days    Historical Provider, MD   clonazePAM (KLONOPIN) 0.5 MG tablet Take 0.5 mg by mouth 2 times daily.      Historical Provider, MD   venlafaxine (EFFEXOR XR) 150 MG extended release capsule Take 150 mg by mouth every evening     Historical Provider, MD       Allergies as of 2021 - Fully Reviewed 2021   Allergen Reaction Noted    Latex Hives and Itching 2015       Past Medical History:   Diagnosis Date    Asthma     Cancer ([address]     Breast cancer on left side     CKD (chronic kidney disease)     COVID     Depression     GERD (gastroesophageal reflux disease)     Neuropathy     Osteoporosis     Pernicious anemia     S/P thyroid biopsy     Scoliosis         Surgical History:   Past Surgical History:   Procedure Laterality Date     SECTION       SECTION      x`s    KNEE SURGERY Left 2020    INCISION AND DRAINAGE LEFT KNEE performed by Endy Gilmore MD at [address] [place] [address] 2021    EGD BIOPSY performed by Nenita Garnica MD at [facility] [address]  2021    US THYROID BIOPSY 2021 [facility] ULTRASOUND        Family History:    Family History   Problem Relation Age of Onset    Cancer Mother     Heart Disease Father        Social History     Socioeconomic History    Marital status:      Spouse name: Not on file    Number of children: Not on file    Years of education: Not on file    Highest education level: Not on file   Occupational History    Not on file   Tobacco Use    Smoking status: Never Smoker    Smokeless tobacco: Never Used   Vaping Use    Vaping Use: Never used   Substance and Sexual Activity    Alcohol use: No    Drug use: No    Sexual activity: Not Currently   Other Topics Concern    Not on file   Social History Narrative    Not on file     Social Determinants of Health     Financial Resource Strain:     Difficulty of Paying Living Expenses: Not on file   Food Insecurity:     Worried About Running Out of Food in the Last Year: Not on file    Arabella of Food in the Last Year: Not on file   Transportation Needs:     Lack of Transportation (Medical): Not on file    Lack of Transportation (Non-Medical): Not on file   Physical Activity:     Days of Exercise per Week: Not on file    Minutes of Exercise per Session: Not on file   Stress:     Feeling of Stress : Not on file   Social Connections:     Frequency of Communication with Friends and Family: Not on file    Frequency of Social Gatherings with Friends and Family: Not on file    Attends Buddhism Services: Not on file    Active Member of [address] [place] [address] [employer]" or Organizations: Not on file    Attends Club or Organization Meetings: Not on file    Marital Status: Not on file   Intimate Partner Violence:     Fear of Current or Ex-Partner: Not on file    Emotionally Abused: Not on file    Physically Abused: Not on file    Sexually Abused: Not on file   Housing Stability:     Unable to Pay for Housing in the Last Year: Not on file    Number of [place] in the Last Year: Not on file    Unstable Housing in the Last Year: Not on file       Nursing notes reviewed. ED Triage Vitals [11/23/21 1145]   Enc Vitals Group      BP (!) 181/81      Pulse 93      Resp 19      Temp 99.3 °F (37.4 °C)      Temp Source Temporal      SpO2 98 %      Weight 132 lb (59.9 kg)      Height 5' 4\" (1.626 m)      Head Circumference       Peak Flow       Pain Score       Pain Loc       Pain Edu? Excl. in [address]? GENERAL:  Awake, alert. Well developed, well nourished with no apparent distress. HENT:  Normocephalic, Atraumatic, moist mucous membranes. EYES:  Pupils equal round and reactive to light, Conjunctiva normal, extraocular movements normal.  NECK:  No meningeal signs, Supple. CHEST:  Regular rate and rhythm, chest wall non-tender. LUNGS:  Clear to auscultation bilaterally. ABDOMEN:  Soft, non-tender, no rebound, rigidity or guarding, non-distended, normal bowel sounds. No costovertebral angle tenderness to palpation. BACK:  No tenderness. EXTREMITIES:  Normal range of motion, no edema, no bony tenderness, no deformity, distal pulses present. SKIN: Warm, dry and intact. NEUROLOGIC: Normal mental status. Moving all extremities to command. RADIOLOGY  X-RAYS:  I have reviewed radiologic plain film image(s). ALL OTHER NON-PLAIN FILM IMAGES SUCH AS CT, ULTRASOUND AND MRI HAVE BEEN READ BY THE RADIOLOGIST. XR CHEST PORTABLE   Final Result   No acute cardiopulmonary disease. MEDICAL DECISION MAKING        Patient is well-appearing and has a normal pulse oxygenation as well as a normal chest x-ray. I do not believe she has Covid pneumonia. I do not see any evidence at this time of any complication of her Covid infection. I advised her to watch for any signs of shortness of breath such as difficulty breathing doing any daily activity and that if she has a pulse oximeter to check her oxygen level and ensure that is greater than 90%. I advised the patient to return to the emergency department immediately for any new or worsening symptoms, such as chest pain or shortness of breath. The patient voiced agreement and understanding of the treatment plan. I estimate there is LOW risk for ACUTE CORONARY SYNDROME, CHRONIC OBSTRUCTIVE PULMONARY DISEASE, CONGESTIVE HEART FAILURE, PERICARDIAL TAMPONADE, PNEUMONIA, PNEUMOTHORAX, PULMONARY EMBOLISM, SEPSIS, and THORACIC DISSECTION,  thus I consider the discharge disposition reasonable. [address] [place] [address] Se and I have discussed the diagnosis and risks, and we agree with discharging home to follow-up with their primary doctor. We also discussed returning to the Emergency Department immediately if new or worsening symptoms occur.  We have discussed the symptoms which are most concerning (e.g., bloody sputum, fever, worsening pain or shortness of breath, vomiting) that necessitate immediate return. CLINICAL IMPRESSION:  1. COVID-19    2. Exacerbation of asthma, unspecified asthma severity, unspecified whether persistent        BP (!) 181/81   Pulse 93   Temp 99.3 °F (37.4 °C) (Temporal)   Resp 18   Ht 5' 4\" (1.626 m)   Wt 132 lb (59.9 kg)   SpO2 98%   BMI 22.66 kg/m²       Patient was given scripts for the following medications. I counseled patient how to take these medications. Discharge Medication List as of 11/23/2021  1:20 PM      START taking these medications    Details   predniSONE (DELTASONE) 10 MG tablet Take 6 tablets by mouth daily for 4 days, Disp-24 tablet, R-0Normal             Disposition  Pt is in good condition upon Discharge to home. This chart was generated using the [address] [place] [address] [address]  dictation system. I created this record but it may contain dictation errors.           Mehreen Gunderson MD  11/23/21 2479

## 2021-12-02 NOTE — PROGRESS NOTES
Physician Progress Note      Anna Arceo  [facility] #:                  [id number]  :                       1935  ADMIT DATE:       2021 2:57 PM  [address] DATE:        2021 4:13 PM  RESPONDING  PROVIDER #:        Paula Mosqueda MD          QUERY TEXT:    Dear Dr Zechariah Laureano,    Pt admitted with chest pain. If possible, please document in progress notes   and discharge summary if you are evaluating and/or treating any of the   following: The medical record reflects the following:  Risk Factors: HX- Asthma, GERD,  Clinical Indicators: trop 0.03/0.02,  Per DS on 21-Cardiology was   consulted, echocardiogram was obtained with normal lv function with   recommended outpatient stress test. Weight loss, nausea, abd discomfort was   followed up by GI consult with normal EGD. Treatment: Ser Trop, Cardiology and GI Consults, ECHO, EGD    Thank You,  Dixie Nageotte RN BSN CDS [email or web address]  [email or web address]  Options provided:  -- Chest pain due to other cause, please specify. -- Chest pain possibly due to recent pneumonia  -- Chest pain possibly due to GERD  -- Other - I will add my own diagnosis  -- Disagree - Not applicable / Not valid  -- Disagree - Clinically unable to determine / Unknown  -- Refer to Clinical Documentation Reviewer    PROVIDER RESPONSE TEXT:    This patient has chest pain possibly due to recent pneumonia. Query created by: Fabio Rosenberg on 11/10/2021 6:47 AM      QUERY TEXT:    Patient admitted with chest pain, noted to have CKD. If possible, please   document in progress notes and discharge summary if you are evaluating and/or   treating any of the following: The medical record reflects the following:  Risk Factors: HX- CKD  Clinical Indicators: BUN 20, CRT 0.9, GFR 60. Paullette Rakes Paullette Rakes Per H&P- Past medical history-    CKD (chronic kidney disease)  Treatment: Monitor    Thank You,  Dixie Nageotte RN BSN CDS CRCR  [email or web address]  Options provided:  -- CKD Stage 1 GFR>90  -- CKD Stage 2 GFR 60-90  -- CKD Stage 3a GFR 45-59  -- CKD Stage 3b GFR 30-44  -- CKD Stage 4 GFR 15-29  -- CKD Stage 5 GFR<15  -- ESRD  -- Other - I will add my own diagnosis  -- Disagree - Not applicable / Not valid  -- Disagree - Clinically unable to determine / Unknown  -- Refer to Clinical Documentation Reviewer    PROVIDER RESPONSE TEXT:    This patient has CKD Stage 3a.     Query created by: Charlie Mcmillan on 11/23/2021 10:28 AM      Electronically signed by:  Rene Walters MD 12/2/2021 4:00 PM

## 2022-03-09 ENCOUNTER — TELEPHONE (OUTPATIENT)
Dept: BREAST CENTER | Age: 87
End: 2022-03-09

## 2022-03-09 NOTE — TELEPHONE ENCOUNTER
Called patient left message on voice mail to change appointment on 07/27/22 - with mammogram- Daniel Jackson CNP at [facility]. Appointment has been cancelled.     Schedule has changed from Wednesday to the 2nd and 4th Friday at [facility].  -  Please call office at [phone] so we may reschedule your appointment

## 2022-07-22 ENCOUNTER — HOSPITAL ENCOUNTER (OUTPATIENT)
Dept: WOMENS IMAGING | Age: 87
Discharge: HOME | End: 2022-07-22
Payer: MEDICARE

## 2022-07-22 ENCOUNTER — OFFICE VISIT (OUTPATIENT)
Dept: BREAST CENTER | Age: 87
End: 2022-07-22
Payer: MEDICARE

## 2022-07-22 VITALS
OXYGEN SATURATION: 99 % | RESPIRATION RATE: 18 BRPM | SYSTOLIC BLOOD PRESSURE: 132 MMHG | HEART RATE: 76 BPM | DIASTOLIC BLOOD PRESSURE: 74 MMHG | HEIGHT: 64 IN | BODY MASS INDEX: 22.71 KG/M2 | WEIGHT: 133 LBS

## 2022-07-22 DIAGNOSIS — Z85.3 ENCOUNTER FOR FOLLOW-UP SURVEILLANCE OF BREAST CANCER: ICD-10-CM

## 2022-07-22 DIAGNOSIS — Z85.3 HISTORY OF BREAST CANCER: ICD-10-CM

## 2022-07-22 DIAGNOSIS — Z85.3 ENCOUNTER FOR FOLLOW-UP SURVEILLANCE OF BREAST CANCER: Primary | ICD-10-CM

## 2022-07-22 DIAGNOSIS — Z12.31 ENCOUNTER FOR SCREENING MAMMOGRAM FOR BREAST CANCER: ICD-10-CM

## 2022-07-22 DIAGNOSIS — Z90.12 S/P PARTIAL MASTECTOMY, LEFT: ICD-10-CM

## 2022-07-22 DIAGNOSIS — Z08 ENCOUNTER FOR FOLLOW-UP SURVEILLANCE OF BREAST CANCER: ICD-10-CM

## 2022-07-22 DIAGNOSIS — Z08 ENCOUNTER FOR FOLLOW-UP SURVEILLANCE OF BREAST CANCER: Primary | ICD-10-CM

## 2022-07-22 DIAGNOSIS — Z12.31 ENCOUNTER FOR SCREENING MAMMOGRAM FOR MALIGNANT NEOPLASM OF BREAST: ICD-10-CM

## 2022-07-22 DIAGNOSIS — Z12.39 ENCOUNTER FOR SCREENING BREAST EXAMINATION: Primary | ICD-10-CM

## 2022-07-22 PROCEDURE — 77063 BREAST TOMOSYNTHESIS BI: CPT

## 2022-07-22 PROCEDURE — 1123F ACP DISCUSS/DSCN MKR DOCD: CPT | Performed by: NURSE PRACTITIONER

## 2022-07-22 PROCEDURE — 99213 OFFICE O/P EST LOW 20 MIN: CPT | Performed by: NURSE PRACTITIONER

## 2022-07-22 RX ORDER — PANTOPRAZOLE SODIUM 40 MG/1
40 TABLET, DELAYED RELEASE ORAL DAILY
COMMUNITY

## 2022-07-22 NOTE — PROGRESS NOTES
Surgical Breast Oncology       CC: One year [id number] for breast check and mammogram     HPI: Cristi Schilling is a 82 y. o. woman here for annual follow up for breast check secondary to personal history of breast cancer. She is s/p left lumpectomy, snbx, [place] 3/2018. 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 Microinvasion had been noted on prior bx. TisN0. ER/OK positive. Postop radiation. On Arimidex and tolerating it well. She has no breast related concerns today. She states that she does not perform routine self breast evaluations and has not noticed any new abnormalities such as masses, skin changes, color changes, nipple discharge, or changes to the nipple-areolar complex. Mammogram today reveals stable postsurgical changes in the lateral left breast.  No new concerning findings suggestive of malignancy. BI-RADS 2.           Past Medical History:   Diagnosis Date    Asthma     Cancer ([address]     Breast cancer on left side     CKD (chronic kidney disease)     COVID     Depression     GERD (gastroesophageal reflux disease)     Neuropathy     Osteoporosis     Pernicious anemia     S/P thyroid biopsy     Scoliosis        Past Surgical History:   Procedure Laterality Date     SECTION       SECTION      x`s    KNEE SURGERY Left 2020    INCISION AND DRAINAGE LEFT KNEE performed by Elke Velasco MD at [address] 2021    EGD BIOPSY performed by Darryl Tran MD at [address] [place] [address]  2021    US THYROID BIOPSY 2021 [facility]       Family History   Problem Relation Age of Onset    Cancer Mother     Heart Disease Father        Allergies as of 2022 - Fully Reviewed 2022   Allergen Reaction Noted    Latex Hives and Itching 2015       Social History     Tobacco Use    Smoking status: Never    Smokeless tobacco: Never   Vaping Use    Vaping Use: Never used   Substance Use Topics Alcohol use: No    Drug use: No       Current Outpatient Medications on File Prior to Visit   Medication Sig Dispense Refill    Bismuth Subsalicylate 988 MG TABS bismuth subsalicylate 975 MG Oral Tablet    prn    Active      pantoprazole (PROTONIX) 40 MG tablet Take 40 mg by mouth in the morning. mirtazapine (REMERON) 15 MG tablet TAKE 1 TABLET BY MOUTH AT BEDTIME.      calcium citrate-vitamin D (CITRICAL + D) 315-250 MG-UNIT TABS per tablet Take 1 tablet by mouth every morning (before breakfast)      gabapentin (NEURONTIN) 100 MG capsule Take 200 mg by mouth every evening. polyethylene glycol (GLYCOLAX) 17 g packet Take 17 g by mouth 2 times daily      omeprazole (PRILOSEC) 20 MG delayed release capsule Take 20 mg by mouth daily      anastrozole (ARIMIDEX) 1 MG tablet Take 1 mg by mouth every evening       albuterol sulfate  (90 Base) MCG/ACT inhaler Inhale 2 puffs into the lungs every 6 hours as needed for Wheezing      fluticasone-salmeterol (ADVAIR HFA) 115-21 MCG/ACT inhaler Inhale 2 puffs into the lungs 2 times daily      Ergocalciferol (VITAMIN D2 PO) Take 50,000 Units by mouth Twice a Week Twice weekly       alendronate (FOSAMAX) 70 MG tablet Take 70 mg by mouth every 7 days      clonazePAM (KLONOPIN) 0.5 MG tablet Take 0.5 mg by mouth 2 times daily. venlafaxine (EFFEXOR XR) 150 MG extended release capsule Take 150 mg by mouth every evening        No current facility-administered medications on file prior to visit. Medications: documentation has been reviewed in the electronic medical record and patient office intake form.      REVIEW OF SYSTEMS:  Constitutional: Negative for fever  HENT: Negative for sore throat  Eyes: Negative for redness   Respiratory: Negative for dyspnea, cough  Cardiovascular: Negative for chest pain  Gastrointestinal: Negative for vomiting, diarrhea   Genitourinary: Negative for hematuria   Musculoskeletal: Negative for arthralgias   Skin: Negative for rash  Neurological: Negative for syncope  Hematological: Negative for adenopathy  Psychiatric/Behavorial: Negative for anxiety         PHYSICAL EXAM:  /74   Pulse 76   Resp 18   Ht 5' 4\" (1.626 m)   Wt 133 lb (60.3 kg)   SpO2 99%   BMI 22.83 kg/m²   Constitutional: She appearswell-nourished. No apparent distress. Breast: The patient was examined in the upright and supine position. Breasts are symmetrically ptotic. Right: No new masses or changes in breast contour. No skin changes of the breast or nipple areolar complex. No nipple inversion or discharge. No erythema, thickening (peau d'orange), or dimpling. Left: well healed scar and expected post surgical changes. No new masses or changes in breast contour. No skin changes of the breast or nipple areolar complex. No nipple inversion or discharge. No erythema, thickening (peau d'orange), or dimpling. There is no axillary lymphadenopathy palpated bilaterally. Head: Normocephalic and atraumatic:   Eyes: EOM are normal. Pupils are equal, round, and reactive to light. Neck: Neck supple. No tracheal deviation present. No obvious mass. Lymphatics: No palpable supraclavicular, cervical, or axillary lymphadenopathy  Skin: No rash noted. No erythema. Neurologic: alert and oriented. Extremities: appear well perfused. No edema. No joint deformity         ASSESSMENT:  - Screening Breast Examination - stable breast examination and stable bilateral screening mammogram.    - Personal History of Breast Cancer - s/p left partial mastectomy, [facility], [place] 3/2018. 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 Microinvasion had been noted on prior bx. TisN0. ER/OK positive. Postop radiation.  On Arimidex  - Encounter for follow-up surveillance of breast cancer       PLAN:   Continue annual screening mammography with tomosynthesis  Continue annual clinical breast exams   Continue endocrine therapy per medical oncology   Signs/symptoms of recurrence were reviewed. She verbalizes understanding that she should notify the office if she identifies any abnormalities on self evaluation. Follow up cancer surveillance discussed   Discussed the importance of breast awareness including the importance and technique of self breast exams  Today's imaging was reviewed and the results were discussed with the patient, all questions answered  Education provided for Healthy Lifestyle Recommendations: healthy diet, routine exercise, weight control, decreased alcohol consumption. Follow up after annual mammogram in 1 year          LATASHA Walker-CNP  [facility])   Surgical Breast Oncology   [phone]    All of the patient's questions were answered at this time however, she was encouraged to call the office with any further inquiries. Approximately 20 minutes of time were spent in preparation, direct patient contact, counseling, care coordination, documentation and activities otherwise related to this encounter.

## 2022-07-22 NOTE — PATIENT INSTRUCTIONS

## 2022-09-23 ENCOUNTER — HOSPITAL ENCOUNTER (EMERGENCY)
Age: 87
Discharge: HOME | End: 2022-09-24
Attending: EMERGENCY MEDICINE
Payer: MEDICARE

## 2022-09-23 ENCOUNTER — APPOINTMENT (OUTPATIENT)
Dept: GENERAL RADIOLOGY | Age: 87
End: 2022-09-23
Payer: MEDICARE

## 2022-09-23 DIAGNOSIS — R11.2 NON-INTRACTABLE VOMITING WITH NAUSEA, UNSPECIFIED VOMITING TYPE: Primary | ICD-10-CM

## 2022-09-23 DIAGNOSIS — U07.1 COVID-19: ICD-10-CM

## 2022-09-23 LAB
BASOPHILS ABSOLUTE: 0 K/UL (ref 0–0.2)
BASOPHILS RELATIVE PERCENT: 0.3 %
EOSINOPHILS ABSOLUTE: 0.1 K/UL (ref 0–0.6)
EOSINOPHILS RELATIVE PERCENT: 1.1 %
HCT VFR BLD CALC: 35.7 % (ref 36–48)
HEMOGLOBIN: 12.2 G/DL (ref 12–16)
LYMPHOCYTES ABSOLUTE: 0.8 K/UL (ref 1–5.1)
LYMPHOCYTES RELATIVE PERCENT: 7.3 %
MCH RBC QN AUTO: 32.3 PG (ref 26–34)
MCHC RBC AUTO-ENTMCNC: 34.2 G/DL (ref 31–36)
MCV RBC AUTO: 94.5 FL (ref 80–100)
MONOCYTES ABSOLUTE: 1.1 K/UL (ref 0–1.3)
MONOCYTES RELATIVE PERCENT: 10.1 %
NEUTROPHILS ABSOLUTE: 8.6 K/UL (ref 1.7–7.7)
NEUTROPHILS RELATIVE PERCENT: 81.2 %
PDW BLD-RTO: 13.6 % (ref 12.4–15.4)
PLATELET # BLD: 168 K/UL (ref 135–450)
PMV BLD AUTO: 7.9 FL (ref 5–10.5)
RBC # BLD: 3.78 M/UL (ref 4–5.2)
WBC # BLD: 10.6 K/UL (ref 4–11)

## 2022-09-23 PROCEDURE — 71045 X-RAY EXAM CHEST 1 VIEW: CPT

## 2022-09-23 PROCEDURE — 80053 COMPREHEN METABOLIC PANEL: CPT

## 2022-09-23 PROCEDURE — 36415 COLL VENOUS BLD VENIPUNCTURE: CPT

## 2022-09-23 PROCEDURE — 96374 THER/PROPH/DIAG INJ IV PUSH: CPT

## 2022-09-23 PROCEDURE — 81003 URINALYSIS AUTO W/O SCOPE: CPT

## 2022-09-23 PROCEDURE — 99285 EMERGENCY DEPT VISIT HI MDM: CPT

## 2022-09-23 PROCEDURE — 2580000003 HC RX 258: Performed by: EMERGENCY MEDICINE

## 2022-09-23 PROCEDURE — 6370000000 HC RX 637 (ALT 250 FOR IP): Performed by: EMERGENCY MEDICINE

## 2022-09-23 PROCEDURE — 6360000002 HC RX W HCPCS: Performed by: EMERGENCY MEDICINE

## 2022-09-23 PROCEDURE — 85025 COMPLETE CBC W/AUTO DIFF WBC: CPT

## 2022-09-23 PROCEDURE — 84145 PROCALCITONIN (PCT): CPT

## 2022-09-23 PROCEDURE — 93005 ELECTROCARDIOGRAM TRACING: CPT | Performed by: EMERGENCY MEDICINE

## 2022-09-23 PROCEDURE — 83880 ASSAY OF NATRIURETIC PEPTIDE: CPT

## 2022-09-23 PROCEDURE — 84484 ASSAY OF TROPONIN QUANT: CPT

## 2022-09-23 RX ORDER — 0.9 % SODIUM CHLORIDE 0.9 %
500 INTRAVENOUS SOLUTION INTRAVENOUS ONCE
Status: COMPLETED | OUTPATIENT
Start: 2022-09-23 | End: 2022-09-24

## 2022-09-23 RX ORDER — ACETAMINOPHEN 325 MG/1
650 TABLET ORAL ONCE
Status: COMPLETED | OUTPATIENT
Start: 2022-09-23 | End: 2022-09-23

## 2022-09-23 RX ORDER — ONDANSETRON 2 MG/ML
4 INJECTION INTRAMUSCULAR; INTRAVENOUS ONCE
Status: COMPLETED | OUTPATIENT
Start: 2022-09-23 | End: 2022-09-23

## 2022-09-23 RX ADMIN — SODIUM CHLORIDE 500 ML: 9 INJECTION, SOLUTION INTRAVENOUS at 23:42

## 2022-09-23 RX ADMIN — ACETAMINOPHEN 650 MG: 325 TABLET ORAL at 23:44

## 2022-09-23 RX ADMIN — ONDANSETRON 4 MG: 2 INJECTION INTRAMUSCULAR; INTRAVENOUS at 23:38

## 2022-09-23 ASSESSMENT — PAIN SCALES - GENERAL: PAINLEVEL_OUTOF10: 5

## 2022-09-23 ASSESSMENT — PAIN DESCRIPTION - LOCATION: LOCATION: OTHER (COMMENT)

## 2022-09-24 VITALS
SYSTOLIC BLOOD PRESSURE: 136 MMHG | WEIGHT: 142.2 LBS | DIASTOLIC BLOOD PRESSURE: 62 MMHG | RESPIRATION RATE: 22 BRPM | HEART RATE: 79 BPM | TEMPERATURE: 97.9 F | HEIGHT: 64 IN | BODY MASS INDEX: 24.28 KG/M2 | OXYGEN SATURATION: 96 %

## 2022-09-24 LAB
A/G RATIO: 1.9 (ref 1.1–2.2)
ALBUMIN SERPL-MCNC: 4.2 G/DL (ref 3.4–5)
ALP BLD-CCNC: 63 U/L (ref 40–129)
ALT SERPL-CCNC: 18 U/L (ref 10–40)
ANION GAP SERPL CALCULATED.3IONS-SCNC: 8 MMOL/L (ref 3–16)
AST SERPL-CCNC: 22 U/L (ref 15–37)
BILIRUB SERPL-MCNC: 0.5 MG/DL (ref 0–1)
BILIRUBIN URINE: NEGATIVE
BLOOD, URINE: NEGATIVE
BUN BLDV-MCNC: 25 MG/DL (ref 7–20)
CALCIUM SERPL-MCNC: 10.5 MG/DL (ref 8.3–10.6)
CHLORIDE BLD-SCNC: 103 MMOL/L (ref 99–110)
CLARITY: CLEAR
CO2: 29 MMOL/L (ref 21–32)
COLOR: YELLOW
CREAT SERPL-MCNC: 0.9 MG/DL (ref 0.6–1.2)
EKG ATRIAL RATE: 77 BPM
EKG DIAGNOSIS: NORMAL
EKG P AXIS: 73 DEGREES
EKG P-R INTERVAL: 108 MS
EKG Q-T INTERVAL: 416 MS
EKG QRS DURATION: 132 MS
EKG QTC CALCULATION (BAZETT): 470 MS
EKG R AXIS: 58 DEGREES
EKG T AXIS: 50 DEGREES
EKG VENTRICULAR RATE: 77 BPM
GFR AFRICAN AMERICAN: >60
GFR NON-AFRICAN AMERICAN: 60
GLUCOSE BLD-MCNC: 97 MG/DL (ref 70–99)
GLUCOSE URINE: NEGATIVE MG/DL
KETONES, URINE: ABNORMAL MG/DL
LEUKOCYTE ESTERASE, URINE: NEGATIVE
MICROSCOPIC EXAMINATION: ABNORMAL
NITRITE, URINE: NEGATIVE
PH UA: 6.5 (ref 5–8)
POTASSIUM REFLEX MAGNESIUM: 4 MMOL/L (ref 3.5–5.1)
PRO-BNP: 157 PG/ML (ref 0–449)
PROCALCITONIN: 0.04 NG/ML (ref 0–0.15)
PROTEIN UA: NEGATIVE MG/DL
SODIUM BLD-SCNC: 140 MMOL/L (ref 136–145)
SPECIFIC GRAVITY UA: 1.02 (ref 1–1.03)
TOTAL PROTEIN: 6.4 G/DL (ref 6.4–8.2)
TROPONIN: 0.02 NG/ML
TROPONIN: 0.03 NG/ML
URINE REFLEX TO CULTURE: ABNORMAL
URINE TYPE: ABNORMAL
UROBILINOGEN, URINE: 0.2 E.U./DL

## 2022-09-24 PROCEDURE — 84484 ASSAY OF TROPONIN QUANT: CPT

## 2022-09-24 PROCEDURE — 36415 COLL VENOUS BLD VENIPUNCTURE: CPT

## 2022-09-24 PROCEDURE — 93010 ELECTROCARDIOGRAM REPORT: CPT | Performed by: INTERNAL MEDICINE

## 2022-09-24 RX ORDER — ONDANSETRON 4 MG/1
4 TABLET, ORALLY DISINTEGRATING ORAL EVERY 8 HOURS PRN
Qty: 20 TABLET | Refills: 0 | Status: SHIPPED | OUTPATIENT
Start: 2022-09-24

## 2022-09-24 ASSESSMENT — ENCOUNTER SYMPTOMS
SORE THROAT: 0
VOMITING: 1
NAUSEA: 1
WHEEZING: 0
CHEST TIGHTNESS: 0
COUGH: 1
ABDOMINAL PAIN: 0
CONSTIPATION: 0
SHORTNESS OF BREATH: 0
DIARRHEA: 0

## 2022-09-24 NOTE — DISCHARGE INSTRUCTIONS
Most people with respiratory infections like colds, the flu, and Coronavirus Disease (COVID-19) will have mild illness and can get better with appropriate home care and without the need to see a medical provider. People who are elderly, pregnant, or have a weak immune system, or other medical problem are at higher risk of more serious illness or complications. It is recommended that they carefully monitor their symptoms closely and seek medical care early. Treatment   There is no specific treatment for most viruses, including those that cause the common cold and those that cause COVID-19. Sometimes there is treatment for viruses that cause influenza if given soon after the onset of symptoms. Antibiotics treat infections caused by bacteria, but they do not work against viruses. Most people recover on their own from these viruses, including COVID-19. Here are steps that you can take to help you get better:      Rest     Drink plenty of fluids     Take over-the-counter cold and flu medications to reduce fever and pain. Follow the instructions on the package, unless your doctor gave you specific instructions. Note that these medicines do not 'cure' the illness and do not stop you from spreading germs. When to [address] [place] [address] should seek medical care if you are not getting better within a week, or if your symptoms get worse. It is best to call your doctor ahead of time to discuss your symptoms, if possible. Some providers offer telemedicine services that can assist as well. This may allow you to receive the advice you need by phone. By avoiding a visit to a healthcare facility, you protect yourself from getting a new infection and protect others from catching an infection from you. If you do visit a healthcare facility, put on a mask to protect other patients and staff.      It is recommended that you seek medical care and return immediately to the Emergency Department for any serious symptoms, such as: Return immediately for any difficulty breathing, confusion, dizziness or passing out, vomiting, chest pain, high fevers, weakness, or any other new or concerning symptoms. ** People with potentially life-threatening symptoms should call 911. If possible, put on a facemask before emergency medical services arrive. What should I do if home isolation has been recommended? The following instructions are provided to assist you to safely care for yourself or others who are infected or potentially infected with COVID-19. These instructions are also available at:   [employer]    It has been determined that you do not need to be hospitalized at this time, and can safely be isolated at home. You should follow the prevention steps below until a health care provider or local health department says you can return to your normal activities. Stay home except to get medical care  You should restrict activities outside your home, except for getting medical care. Under no circumstance should you go to work, school, or public areas. Avoid using public transportation, ride sharing, or taxis. Separate yourself from other people and animals in your home  People: As much as possible, you should stay in a specific room and away from other people in your home. Also, you should use a separate bathroom, if available. Animals: You should restrict contact with pets and other animals while you are sick with COVID-19, just like you would around other people. Although there have not been reports of pets or other animals becoming sick with COVID-19, it is still recommended that people with COVID-19 limit contact with animals until more information is known about the virus. When possible, have another member of your household care for your animals while you are sick.  If you must care for your pet or be around animals while you are sick, wash your hands before and after you interact with pets and wear a facemask. Call ahead before visiting your doctor  If you have a medical appointment, call the health care provider prior to your appointment and tell them that you have or may have COVID-19. This will help the health care provider's office take steps to keep other people from getting infected or exposed. Ask your health care provider to call the local or state health department. Persons who are placed under active monitoring or self-monitoring should follow instructions provided by their local health department or occupational health professionals, as appropriate. If you have a medical emergency and need to call 911, notify the dispatch personnel that you have, or are being evaluated for COVID-19. If possible, put on a facemask before emergency medical services arrive. Take care of your mental health  You might be feeling anxious, afraid, lonely or uncertain. The following link has a guide with a list of helpful behavioral health resources and a few tips for taking care of your emotional health while you are quarantined:   https://[employer]. [facility].gov/system/files/ysi55-7987. pdf     Wear a face mask  You should wear a facemask when you are around other people (for example, sharing a room or vehicle) or pets, and before you enter a health care provider's office. If you are not able to wear a facemask (for example, because it causes trouble breathing), then people who live with you should not stay in the same room with you, or they should wear a facemask if they enter your room. Cover your coughs and sneezes  Cover your mouth and nose with a tissue when you cough or sneeze. Throw used tissues in a lined trash can.     Clean your hands often  Wash your hands often with soap and water for at least 20 seconds or clean your hands with an alcohol-based hand  that contains 60 to 95% alcohol, covering all surfaces of your hands and rubbing them together until they feel dry. Soap and water should be used preferentially if your hands are visibly dirty. Avoid touching your eyes, nose, and mouth with unwashed hands. Avoid touching your face  Viruses that affect the respiratory system enter the body through mucous membranes which are found in the eyes, nose, and mouth. It only takes one touch for germs on your fingers to enter your body through your nostrils, eyes, or mouth. In addition to hand hygiene, this is an important way to help prevent transmission of infections. Clean all high-touch surfaces every day  High-touch surfaces include counters, tabletops, doorknobs, bathroom fixtures, toilets, phones, keyboards, tablets, and bedside tables. Also, clean any surfaces that may have blood, stool, or body fluids on them. Use a household cleaning spray or wipe, according to the label instructions. Labels contain instructions for safe and effective use of the cleaning product including precautions you should take when applying the product, such as wearing gloves and making sure you have good ventilation while using the product. Avoid sharing personal household items  You should not share dishes, drinking glasses, cups, eating utensils, towels or bedding with other people or pets in your home. After using these items, they should be washed thoroughly with soap and water. Monitor your symptoms  Please contact the [address] as soon as possible. Persons who are placed under active monitoring or facilitated self-monitoring should follow instructions provided by their local health department or occupational health professionals, as appropriate. Additional information is available at:     www.coronavirus. gov    Seek immediate medical attention if your illness is worsening of if you develop any of the following: difficulty breathing, confusion, dizziness or passing out, vomiting, high fevers, weakness, or any other new or concerning symptoms.  Before seeking care, call your health care provider and tell them that you have, or are being evaluated for COVID-19. Put on a facemask before you enter the facility. These steps will help keep other people in the office or waiting room from getting infected or exposed. If you have a medical emergency and need to call 911, notify the dispatch personnel that you have, or are being evaluated for COVID-19. If possible, put on a face mask before emergency medical services arrive. Discontinuing home isolation  Patients with confirmed COVID-19 should remain under home isolation precautions until the risk of secondary transmission to others is thought to be low. The decision to discontinue home isolation precautions is made on a case-by-case basis, in consultation with health care providers, and state and local health departments. Recommended precautions for household members, intimate partners, and caregivers  If you are providing care for a person infected or suspected to be infected with COVID-19, please note the following instructions, which are also available at: [employer]     How do I take care of someone who is quarantined in my home? Household members, intimate partners, and caregivers in a non-health care setting may have close contact (within 6 feet) with a person with symptomatic, laboratory-confirmed COVID-19 or a person under investigation. Those in close contact should monitor their health and should call their health care provider right away if they develop symptoms suggestive of COVID-19 (such as fever, cough, shortness of breath). Those in close contact should also follow these recommendations:  Make sure that you understand and can help the patient follow their health care provider's instructions for medication(s) and care.  You should help the patient with basic needs in the home and provide support for getting groceries, prescriptions and other personal needs  Monitor the patient's symptoms. If the patient is getting sicker, call his or her health care provider and tell them that the patient has laboratory-confirmed or is under investigation for COVID-19. This will help the health care provider's office take steps to keep other people in the office or waiting room from getting infected. Ask the health care provider to call the local or [facility] health department for additional guidance. If the patient has a medical emergency and you need to call 911, notify the dispatch personnel that the patient has, or is being evaluated for COVID-19  Household members should stay in another room or be  from the patient as much as possible. Household members should use a separate bedroom and bathroom, if available  Prohibit visitors who do not have an essential need to be in the home  Household members should care for any pets in the home. Do not handle pets or other animals while sick    Make sure that shared spaces in the home have good airflow, such as by an air conditioner or an opened window, weather permitting  Perform hand hygiene frequently. Wash your hands often with soap and water for at least 20 seconds or use an alcohol-based hand  that contains 60 to 95% alcohol, covering all surfaces of your hands and rubbing them together until they feel dry. Soap and water should be used preferentially if hands are visibly dirty  Avoid touching your eyes, nose, and mouth with unwashed hands  You and the patient should wear a facemask if you are in the same room  Wear a disposable facemask and gloves when you touch or have contact with the patient's blood, stool, or body fluids, such as saliva, sputum, nasal mucus, vomit or urine. Do not reuse disposable facemasks and gloves. Throw them away after using them. When removing personal protective equipment, first remove and dispose of gloves.  Then, immediately clean your hands with soap and water or alcohol-based hand . Next, remove and dispose of facemask, and immediately clean your hands again with soap and water or alcohol-based hand   Avoid sharing household items with the patient. You should not share dishes, drinking glasses, cups, eating utensils, towels, bedding or other items. After the patient uses these items, you should wash them thoroughly (see below)  Clean all high-touch surfaces, such as counters, tabletops, doorknobs, bathroom fixtures, toilets, phones, keyboards, tablets and bedside tables, every day. Also, clean any surfaces that may have blood, stool, or body fluids on them  Use a household cleaning spray or wipe, according to the label instructions. Labels contain instructions for safe and effective use of the cleaning product including precautions you should take when applying the product, such as wearing gloves and making sure you have good ventilation during the use of the product  Wash laundry thoroughly  Immediately remove and wash clothes or bedding that have blood, stool, or body fluids on them  Wear disposable gloves while handling soiled items and keep soiled items away from your body. Clean your hands (with soap and water or an alcohol-based hand ) immediately after removing and throwing away your gloves  Read and follow directions on labels for laundry or clothing items and detergent. In general, using a normal laundry detergent according to washing machine instructions and dry thoroughly using the warmest temperatures recommended on the clothing label  Place all used disposable gloves, facemasks, and other contaminated items in a lined container before disposing of them with other household waste. Clean your hands (with soap and water or an alcohol-based hand ) immediately after handling these items.  Soap and water should be used preferentially if hands are visibly dirty  Discuss any additional questions with your state or local health department or health care provider    What if I live with someone who's been told to self-quarantine? If the person you live with is NOT exhibiting respiratory symptoms, you can go about your day-to-day business, and you do not need to be tested or monitored. If the person you live with has respiratory symptoms (such as coughing or sneezing), but has not yet tested positive for COVID-19:  Please make sure to stay home  Monitor your symptoms closely, and seek medical attention if your symptoms are worsening   Avoid public areas and public transportation  Wear a facemask if you are sick  Cover your coughs and sneezes with a tissue, dispose of the tissue and immediately wash your hands  Wash your hands often for at least 20 seconds, and if soap and water are not available, use hand    Avoid touching your eyes, nose or mouth  Avoid sharing personal household items  Clean 'high-touch' surfaces daily  If the person you live with has tested positive for COVID-19, you will be considered a close contact, and will also likely be asked to self-quarantine.

## 2022-09-24 NOTE — ED NOTES
Assisted pt. With bedside commode. Tolerated well. Placed pt. Back on cardiac monitor and pulse ox. No additional needs at this time.       Eddi Ojeda RN  09/24/22 [id number]

## 2022-09-24 NOTE — ED NOTES
Assisted pt. To restroom with walker. Tolerated well. Reconnected pt. To cardiac monitor and pulse ox. Gave pt. A warm blanket. No additional needs at this time.      Camille Ayala RN  09/24/22 [id number]

## 2022-09-24 NOTE — ED TRIAGE NOTES
Pt. Arrived per EMS for c/c of nausea, vomiting, and generalized weakness. Pt. Tested positive for COVID yesterday (9/22). Pt. Has been experiencing new onset hypertension since diagnosis. Pt. Had an episode of emesis upon EMS arrival. Pt. Received 4mg zofran per EMS transit. VS noted and stable except hypertension (164/80) and tachycardia (111). Patient A&Ox4. Respirations easy and unlabored. Skin warm and dry and appropriate for ethnicity. No acute distress noted at this time. Patient placed on continuous telemetry and pulse ox upon arrival to room.

## 2022-09-24 NOTE — ED NOTES
D/C: Order noted for d/c. Pt confirmed d/c paperwork  have correct name. Discharge and education instructions reviewed with patient. Teach-back successful. Pt verbalized understanding and signed d/c papers. Pt denied questions at this time. No acute distress noted. Patient instructed to follow-up as noted - return to emergency department if symptoms worsen. Patient verbalized understanding. Discharged per EDMD with discharge instructions. Pt discharged to private vehicle. Patient stable upon departure. Thanked patient for choosing [facility]) for care.           Yael Melgar RN  09/24/22 [id number]

## 2022-09-24 NOTE — ED PROVIDER NOTES
[facility]  eMERGENCY dEPARTMENTeNCOUnter      Pt Name: Bradly Mishra  MRN: [id number]  [place] 1935  Date ofevaluation: 9/23/2022  Provider: Wild Alvarez MD    CHIEF COMPLAINT       Chief Complaint   Patient presents with    Nausea    Emesis     Pt. Tested covid + yesterday. Pt. Has c/c of nausea, vomiting, and generalized body aches. EMS gave 4mg zofran in transit. Positive For Covid-19         HISTORY OF PRESENT ILLNESS   (Location/Symptom, Timing/Onset,Context/Setting, Quality, Duration, Modifying Factors, Severity)  Note limiting factors. Bradly Mishra is a 82 y.o. female who  has a past medical history of Asthma, Cancer ([address] CKD (chronic kidney disease), COVID, Depression, GERD (gastroesophageal reflux disease), Neuropathy, Osteoporosis, Pernicious anemia, S/P thyroid biopsy, and Scoliosis. presents to the emergency department with EMS for evaluation of nausea and vomiting that started a few hours prior to arrival.  Patient states she tested positive for COVID-19 yesterday. States that today she has been having some nausea as well as general body aches. States she had 1 round of emesis. She threw up 3-4 times. Patient denies any hematemesis. Was treated with Zofran in route to the hospital.  States she is feeling slightly better after the Zofran. Patient denies any chest pain. States she does have a cough that is nonproductive. Denies any documented fevers. States she has been vaccinated for COVID-19. States she has had some urinary frequency. Denies any associate abdominal pain. Normal bowel movements. States has been taking intermittent ibuprofen and Tylenol which does provide relief in the body aches. The history is provided by the patient, medical records and the EMS personnel. NursingNotes were reviewed. Pt was seen during the [place] 19 pandemic. Appropriate PPE worn by ME during patient encounters.  Patient was cared for during a time with constrained hospital bed capacity with nationwide stress on resources and staffing. REVIEW OF SYSTEMS    (2-9 systems for level 4, 10 or more for level 5)     Review of Systems   Constitutional:  Positive for fatigue. Negative for chills, diaphoresis and fever. HENT:  Negative for congestion and sore throat. Respiratory:  Positive for cough. Negative for chest tightness, shortness of breath and wheezing. Cardiovascular:  Negative for chest pain and leg swelling. Gastrointestinal:  Positive for nausea and vomiting. Negative for abdominal pain, constipation and diarrhea. Genitourinary:  Positive for frequency. Negative for decreased urine volume, difficulty urinating, dysuria and urgency. Musculoskeletal:  Positive for myalgias. Negative for arthralgias and neck pain. Skin:  Negative for rash and wound. Neurological:  Negative for dizziness, syncope, weakness and numbness. Psychiatric/Behavioral:  Negative for agitation, behavioral problems and confusion. Except as noted above the remainder of the review of systems was reviewed and negative.        PAST MEDICAL HISTORY     Past Medical History:   Diagnosis Date    Asthma     Cancer ([facility] [address]     Breast cancer on left side     CKD (chronic kidney disease)     COVID     Depression     GERD (gastroesophageal reflux disease)     Neuropathy     Osteoporosis     Pernicious anemia     S/P thyroid biopsy     Scoliosis          SURGICALHISTORY       Past Surgical History:   Procedure Laterality Date     SECTION       SECTION      x`s    KNEE SURGERY Left 2020    INCISION AND DRAINAGE LEFT KNEE performed by Neville Wooten MD at [address] 2021    EGD BIOPSY performed by Jazmyne Contreras MD at [address] [place] [address]  2021    [address] [place] [address],[address] [place] [address]       Discharge Medication List as of 2022  4:18 AM CONTINUE these medications which have NOT CHANGED    Details   Bismuth Subsalicylate 433 MG TABS bismuth subsalicylate 135 MG Oral Tablet    prn    ActiveHistorical Med      pantoprazole (PROTONIX) 40 MG tablet Take 40 mg by mouth in the morning. Historical Med      mirtazapine (REMERON) 15 MG tablet TAKE 1 TABLET BY MOUTH AT BEDTIME. Historical Med      calcium citrate-vitamin D (CITRICAL + D) 315-250 MG-UNIT TABS per tablet Take 1 tablet by mouth every morning (before breakfast)Historical Med      gabapentin (NEURONTIN) 100 MG capsule Take 200 mg by mouth every evening. Historical Med      polyethylene glycol (GLYCOLAX) 17 g packet Take 17 g by mouth 2 times dailyHistorical Med      omeprazole (PRILOSEC) 20 MG delayed release capsule Take 20 mg by mouth dailyHistorical Med      anastrozole (ARIMIDEX) 1 MG tablet Take 1 mg by mouth every evening Historical Med      albuterol sulfate  (90 Base) MCG/ACT inhaler Inhale 2 puffs into the lungs every 6 hours as needed for WheezingHistorical Med      fluticasone-salmeterol (ADVAIR HFA) 115-21 MCG/ACT inhaler Inhale 2 puffs into the lungs 2 times dailyHistorical Med      Ergocalciferol (VITAMIN D2 PO) Take 50,000 Units by mouth Twice a Week Twice weekly Historical Med      alendronate (FOSAMAX) 70 MG tablet Take 70 mg by mouth every 7 daysHistorical Med      clonazePAM (KLONOPIN) 0.5 MG tablet Take 0.5 mg by mouth 2 times daily.  Historical Med      venlafaxine (EFFEXOR XR) 150 MG extended release capsule Take 150 mg by mouth every evening Historical Med                  Latex    FAMILY HISTORY       Family History   Problem Relation Age of Onset    Cancer Mother     Heart Disease Father           SOCIAL HISTORY       Social History     Socioeconomic History    Marital status:    Tobacco Use    Smoking status: Never    Smokeless tobacco: Never   Vaping Use    Vaping Use: Never used   Substance and Sexual Activity    Alcohol use: No    Drug use: No    Sexual activity: Not Currently       SCREENINGS    [place] Coma Scale  Eye Opening: Spontaneous  Best Verbal Response: Oriented  Best Motor Response: Obeys commands  [place] Coma Scale Score: 15        PHYSICAL EXAM    (up to 7 for level 4, 8 or more for level 5)     ED Triage Vitals [09/23/22 2311]   BP Temp Temp Source Heart Rate Resp SpO2 Height Weight   (!) 164/80 97.9 °F (36.6 °C) Oral (!) 111 20 94 % 5' 4\" (1.626 m) 142 lb 3.2 oz (64.5 kg)       Physical Exam  Vitals and nursing note reviewed. Constitutional:       General: She is not in acute distress. Appearance: Normal appearance. She is well-developed. She is not diaphoretic. HENT:      Head: Normocephalic and atraumatic. Mouth/Throat:      Mouth: Mucous membranes are moist.      Pharynx: Oropharynx is clear. Eyes:      Extraocular Movements: Extraocular movements intact. Conjunctiva/sclera: Conjunctivae normal.      Pupils: Pupils are equal, round, and reactive to light. Cardiovascular:      Rate and Rhythm: Regular rhythm. Tachycardia present. Pulses: Normal pulses. Heart sounds: Normal heart sounds. Pulmonary:      Effort: Pulmonary effort is normal. No respiratory distress. Breath sounds: Normal breath sounds. No wheezing or rales. Abdominal:      General: Bowel sounds are normal. There is no distension. Palpations: Abdomen is soft. Tenderness: There is no abdominal tenderness. There is no guarding or rebound. Musculoskeletal:         General: No tenderness. Normal range of motion. Cervical back: Normal range of motion and neck supple. Right lower leg: No edema. Left lower leg: No edema. Skin:     General: Skin is warm and dry. Capillary Refill: Capillary refill takes less than 2 seconds. Findings: No bruising or erythema. Neurological:      Mental Status: She is alert and oriented to person, place, and time. Cranial Nerves: No cranial nerve deficit.       Deep Tendon Reflexes: Reflexes are normal and symmetric. Psychiatric:         Mood and Affect: Mood normal.         Behavior: Behavior normal.         Thought Content: Thought content normal.         Judgment: Judgment normal.       RESULTS     EKG: All EKG's are interpreted by the Emergency Department Physician who either signs or Co-signsthis chart in the absence of a cardiologist.    Sinus rhythm with occasional PAC and a rate of 77, normal axis, , , QTc 470, no ST elevations, nonspecific ST changes, nonspecific intraventricular block, T wave inversions V2 and V3 which are new when compared EKG from 11/3/2021 otherwise no acute changes. RADIOLOGY:   Non-plain filmimages such as CT, Ultrasound and MRI are read by the radiologist. Plain radiographic images are visualized and preliminarily interpreted by the emergency physician with the below findings:      Interpretation per the Radiologist below, if available at the time ofthis note:    XR CHEST PORTABLE   Final Result   Stable chronic changes with no acute abnormality seen.                ED BEDSIDE ULTRASOUND:   Performed by ED Physician - none    LABS:  Labs Reviewed   CBC WITH AUTO DIFFERENTIAL - Abnormal; Notable for the following components:       Result Value    RBC 3.78 (*)     Hematocrit 35.7 (*)     Neutrophils Absolute 8.6 (*)     Lymphocytes Absolute 0.8 (*)     All other components within normal limits   COMPREHENSIVE METABOLIC PANEL W/ REFLEX TO MG FOR LOW K - Abnormal; Notable for the following components:    BUN 25 (*)     GFR Non- 60 (*)     All other components within normal limits   TROPONIN - Abnormal; Notable for the following components:    Troponin 0.03 (*)     All other components within normal limits   URINALYSIS WITH REFLEX TO CULTURE - Abnormal; Notable for the following components:    Ketones, Urine TRACE (*)     All other components within normal limits   TROPONIN - Abnormal; Notable for the following components: Troponin 0.02 (*)     All other components within normal limits   BRAIN NATRIURETIC PEPTIDE   PROCALCITONIN       All other labs were within normal range or not returned as of this dictation. EMERGENCY DEPARTMENT COURSE and DIFFERENTIAL DIAGNOSIS/MDM:   Vitals:    Vitals:    09/24/22 0200 09/24/22 0230 09/24/22 0245 09/24/22 0345   BP: 130/69 129/66 119/63 136/62   Pulse: 83 78 76 79   Resp: 22 21 23 22   Temp:       TempSrc:       SpO2: 95% 95% 93% 96%   Weight:       Height:           MDM  Number of Diagnoses or Management Options  COVID-19  Non-intractable vomiting with nausea, unspecified vomiting type  Diagnosis management comments: GERD, PUD, pancreatitis, cholecystitis, GB colic, cholangitis, Jxbs-Xppn-Xxfceo, ACS/ MI, pneumonia, SBO, mesenteric ischemia, perforated viscous, acute gastroenteritis, NSAP, pyelonephritis, kidney stone, appendicitis, hernia, UTI, constipation         Amount and/or Complexity of Data Reviewed  Clinical lab tests: ordered and reviewed  Tests in the radiology section of CPT®: ordered and reviewed  Tests in the medicine section of CPT®: ordered and reviewed  Decide to obtain previous medical records or to obtain history from someone other than the patient: yes  Review and summarize past medical records: yes  Independent visualization of images, tracings, or specimens: yes    Risk of Complications, Morbidity, and/or Mortality  Presenting problems: moderate  Diagnostic procedures: moderate  Management options: moderate        REASSESSMENT     ED Course as of 09/24/22 0611   Sat Sep 24, 2022   0037 Patient's white count normal 10.6, hemoglobin 12.2. Troponin mildly elevated at 0.03 although patient's baseline appears to be 0.02-0.04 on previous testing. Creatinine 0.9 with BUN of 25. Electrolytes within normal limits. Chest x-ray unremarkable. Procalcitonin normal at 0.04. [DS]   0411 Patient's second COVID test improved at 0.02. Patient reassessed. Updated on results. Patient resting comfortably. Has had no vomiting since being in the emergency room. Discussed discharge with outpatient follow-up. Patient agreeable. Will provide a short course of Zofran at discharge. Return instruction provided. All questions answered prior to discharge. [DS]      ED Course User Index  [DS] Ragini Singh MD     Is this patient to be included in the SEP-1 Core Measure due to severe sepsis or septic shock? No   Exclusion criteria - the patient is NOT to be included for SEP-1 Core Measure due to:  Viral etiology found or highly suspected (including COVID-19) without concomitant bacterial infection        I estimate there is LOW risk for ACUTE APPENDICITIS, BOWEL OBSTRUCTION, CHOLECYSTITIS, DIVERTICULITIS, INCARCERATED HERNIA, PANCREATITIS, PELVIC INFLAMMATORY DISEASE, PERFORATED BOWEL or ULCER, or TUBO-OVARIAN ABSCESS, thus I consider the discharge disposition reasonable. Also, there is no evidence or peritonitis, sepsis, or toxicity. [address] [place] [address] Se and I have discussed the diagnosis and risks, and we agree with discharging home to follow-up with their primary doctor. We also discussed returning to the Emergency Department immediately if new or worsening symptoms occur. We have discussed the symptoms which are most concerning (e.g., bloody stool, fever, changing or worsening pain, vomiting) that necessitate immediate return. CONSULTS:  None    PROCEDURES:  Unless otherwise noted below, none     Procedures    I am the Primary Clinician of Record     FINAL IMPRESSION      1. Non-intractable vomiting with nausea, unspecified vomiting type    2. COVID-19          DISPOSITION/PLAN   DISPOSITION Decision To Discharge 09/24/2022 04:12:08 AM      PATIENT REFERREDTO:  Lionel Gonzales. [facility] [id number]  [address] [facility]  [phone]    Call today  For a follow up appointment.     DISCHARGEMEDICATIONS:  Discharge Medication List as of 9/24/2022  4:18 AM        START taking these medications    Details   ondansetron (ZOFRAN ODT) 4 MG disintegrating tablet Take 1 tablet by mouth every 8 hours as needed for Nausea, Disp-20 tablet, R-0Normal                (Please note that portions of this note were completed with a voice recognition program.  Efforts were made to edit the dictations but occasionally words are mis-transcribed.)    Neno Santos MD (electronically signed)  Attending Emergency Physician          Neno Santos MD  09/24/22 0169

## 2022-11-08 ENCOUNTER — HOSPITAL ENCOUNTER (EMERGENCY)
Age: 87
Discharge: HOME | End: 2022-11-08
Payer: MEDICARE

## 2022-11-08 VITALS
BODY MASS INDEX: 22.39 KG/M2 | WEIGHT: 131.17 LBS | TEMPERATURE: 98 F | RESPIRATION RATE: 16 BRPM | OXYGEN SATURATION: 98 % | HEART RATE: 73 BPM | SYSTOLIC BLOOD PRESSURE: 177 MMHG | DIASTOLIC BLOOD PRESSURE: 81 MMHG | HEIGHT: 64 IN

## 2022-11-08 DIAGNOSIS — R35.1 NOCTURIA: Primary | ICD-10-CM

## 2022-11-08 DIAGNOSIS — R10.9 FLANK PAIN: ICD-10-CM

## 2022-11-08 DIAGNOSIS — R03.0 ELEVATED SYSTOLIC BLOOD PRESSURE READING WITHOUT DIAGNOSIS OF HYPERTENSION: ICD-10-CM

## 2022-11-08 LAB
A/G RATIO: 1.7 (ref 1.1–2.2)
ALBUMIN SERPL-MCNC: 4.3 G/DL (ref 3.4–5)
ALP BLD-CCNC: 59 U/L (ref 40–129)
ALT SERPL-CCNC: 15 U/L (ref 10–40)
ANION GAP SERPL CALCULATED.3IONS-SCNC: 7 MMOL/L (ref 3–16)
AST SERPL-CCNC: 20 U/L (ref 15–37)
BASOPHILS ABSOLUTE: 0 K/UL (ref 0–0.2)
BASOPHILS RELATIVE PERCENT: 0.7 %
BILIRUB SERPL-MCNC: 0.7 MG/DL (ref 0–1)
BILIRUBIN URINE: NEGATIVE
BLOOD, URINE: NEGATIVE
BUN BLDV-MCNC: 20 MG/DL (ref 7–20)
CALCIUM SERPL-MCNC: 10.5 MG/DL (ref 8.3–10.6)
CHLORIDE BLD-SCNC: 104 MMOL/L (ref 99–110)
CLARITY: CLEAR
CO2: 29 MMOL/L (ref 21–32)
COLOR: YELLOW
CREAT SERPL-MCNC: 0.9 MG/DL (ref 0.6–1.2)
EOSINOPHILS ABSOLUTE: 0.1 K/UL (ref 0–0.6)
EOSINOPHILS RELATIVE PERCENT: 2.2 %
GFR SERPL CREATININE-BSD FRML MDRD: >60 ML/MIN/{1.73_M2}
GLUCOSE BLD-MCNC: 78 MG/DL (ref 70–99)
GLUCOSE URINE: NEGATIVE MG/DL
HCT VFR BLD CALC: 36.7 % (ref 36–48)
HEMOGLOBIN: 12.6 G/DL (ref 12–16)
KETONES, URINE: NEGATIVE MG/DL
LEUKOCYTE ESTERASE, URINE: NEGATIVE
LYMPHOCYTES ABSOLUTE: 1.3 K/UL (ref 1–5.1)
LYMPHOCYTES RELATIVE PERCENT: 26.7 %
MCH RBC QN AUTO: 32.2 PG (ref 26–34)
MCHC RBC AUTO-ENTMCNC: 34.3 G/DL (ref 31–36)
MCV RBC AUTO: 93.8 FL (ref 80–100)
MICROSCOPIC EXAMINATION: NORMAL
MONOCYTES ABSOLUTE: 0.5 K/UL (ref 0–1.3)
MONOCYTES RELATIVE PERCENT: 10.2 %
NEUTROPHILS ABSOLUTE: 3 K/UL (ref 1.7–7.7)
NEUTROPHILS RELATIVE PERCENT: 60.2 %
NITRITE, URINE: NEGATIVE
PDW BLD-RTO: 13.4 % (ref 12.4–15.4)
PH UA: 7 (ref 5–8)
PLATELET # BLD: 173 K/UL (ref 135–450)
PMV BLD AUTO: 8.2 FL (ref 5–10.5)
POTASSIUM REFLEX MAGNESIUM: 4.1 MMOL/L (ref 3.5–5.1)
PROTEIN UA: NEGATIVE MG/DL
RBC # BLD: 3.91 M/UL (ref 4–5.2)
SODIUM BLD-SCNC: 140 MMOL/L (ref 136–145)
SPECIFIC GRAVITY UA: 1.01 (ref 1–1.03)
TOTAL PROTEIN: 6.8 G/DL (ref 6.4–8.2)
URINE REFLEX TO CULTURE: NORMAL
URINE TYPE: NORMAL
UROBILINOGEN, URINE: 0.2 E.U./DL
WBC # BLD: 5 K/UL (ref 4–11)

## 2022-11-08 PROCEDURE — 85025 COMPLETE CBC W/AUTO DIFF WBC: CPT

## 2022-11-08 PROCEDURE — 36415 COLL VENOUS BLD VENIPUNCTURE: CPT

## 2022-11-08 PROCEDURE — 81003 URINALYSIS AUTO W/O SCOPE: CPT

## 2022-11-08 PROCEDURE — 6370000000 HC RX 637 (ALT 250 FOR IP): Performed by: GENERAL ACUTE CARE HOSPITAL

## 2022-11-08 PROCEDURE — 99283 EMERGENCY DEPT VISIT LOW MDM: CPT

## 2022-11-08 PROCEDURE — 80053 COMPREHEN METABOLIC PANEL: CPT

## 2022-11-08 RX ORDER — ACETAMINOPHEN 325 MG/1
650 TABLET ORAL ONCE
Status: COMPLETED | OUTPATIENT
Start: 2022-11-08 | End: 2022-11-08

## 2022-11-08 RX ADMIN — ACETAMINOPHEN 650 MG: 325 TABLET ORAL at 19:56

## 2022-11-08 ASSESSMENT — ENCOUNTER SYMPTOMS
SORE THROAT: 0
VOICE CHANGE: 0
ABDOMINAL PAIN: 0
WHEEZING: 0
NAUSEA: 0
CHEST TIGHTNESS: 0
SHORTNESS OF BREATH: 0
BACK PAIN: 0
VOMITING: 0
COUGH: 0

## 2022-11-08 ASSESSMENT — PAIN - FUNCTIONAL ASSESSMENT
PAIN_FUNCTIONAL_ASSESSMENT: NONE - DENIES PAIN
PAIN_FUNCTIONAL_ASSESSMENT: NONE - DENIES PAIN

## 2022-11-08 NOTE — ED TRIAGE NOTES
Pt arrives ambulatory for eval of right flank pain last night and urinary frequency. Pt sts nausea present chronically. Pt denies pain with urination. Pt is a/ox4, rsp non labored and pwd.

## 2022-11-09 NOTE — DISCHARGE INSTRUCTIONS
Continue taking your current medications as directed. It is important that you follow-up with listed urologist as well as with your primary care physician as directed. Return for high fever, incessant vomiting, severe pain, or any other worsening symptoms.

## 2022-11-09 NOTE — ED PROVIDER NOTES
[address] [place]        Pt Name: Elly Riley  MRN: [id number]  [place] 1935  Date of evaluation: 11/8/2022  Provider: LATASHA Grady - CNP  PCP: Hernandez Fields MD  Note Started: 7:34 PM EST 11/8/2022        FLOR. I have evaluated this patient. My supervising physician was available for consultation. CHIEF COMPLAINT       Chief Complaint   Patient presents with    Urinary Frequency    Flank Pain     Pt arrives ambulatory for eval of right flank pain last night and urinary frequency       HISTORY OF PRESENT ILLNESS   (Location, Timing/Onset, Context/Setting, Quality, Duration, Modifying Factors, Severity, Associated Signs and Symptoms)  Note limiting factors. Chief Complaint: Urinary frequency, left flank pain    Elly Riley is a 82 y.o. female who presents to the emergency department today reporting urinary frequency. Patient states that she has been incontinent for years and states that she wears depends. Patient states that she feels as if urinary frequency has become worse and she feels as if she is urinating more than usual as well. Patient states that last night she had an episode of left-sided flank pain. She denies known injury. There is no history of kidney stones. Patient states that pain seem to last for about an hour. She describes the pain as a cramp-like sensation. Pain was nonmigratory. She did not take anything for symptoms. She denies pain at present. Nursing Notes were all reviewed and agreed with or any disagreements were addressed in the HPI. REVIEW OF SYSTEMS    (2-9 systems for level 4, 10 or more for level 5)     Review of Systems   Constitutional:  Negative for chills, fatigue and fever. HENT:  Negative for congestion, sore throat and voice change. Eyes:  Negative for visual disturbance. Respiratory:  Negative for cough, chest tightness, shortness of breath and wheezing. Cardiovascular:  Negative for chest pain and palpitations. Gastrointestinal:  Negative for abdominal pain, nausea and vomiting. Endocrine: Negative for polydipsia and polyuria. Genitourinary:  Positive for flank pain and frequency. Negative for decreased urine volume, difficulty urinating, dysuria and hematuria. Musculoskeletal:  Negative for back pain, neck pain and neck stiffness. Skin:  Negative for rash and wound. Allergic/Immunologic: Negative for immunocompromised state. Neurological:  Negative for dizziness, weakness and light-headedness. Hematological:  Does not bruise/bleed easily. Psychiatric/Behavioral:  Negative for sleep disturbance and suicidal ideas. Positives and Pertinent negatives as per HPI. Except as noted above in the ROS, all other systems were reviewed and negative.        PAST MEDICAL HISTORY     Past Medical History:   Diagnosis Date    Asthma     Cancer ([facility] [address]     Breast cancer on left side     CKD (chronic kidney disease)     COVID     Depression     GERD (gastroesophageal reflux disease)     Neuropathy     Osteoporosis     Pernicious anemia     S/P thyroid biopsy     Scoliosis          SURGICAL HISTORY     Past Surgical History:   Procedure Laterality Date     SECTION       SECTION      x`s    KNEE SURGERY Left 2020    INCISION AND DRAINAGE LEFT KNEE performed by Emerald Rae MD at [place] [address] 2021    EGD BIOPSY performed by Marilyn Farfan MD at [address] [place] [address]  2021    US THYROID BIOPSY 2021 [facility] ULTRASOUND         CURRENTMEDICATIONS       Previous Medications    ALBUTEROL SULFATE  (90 BASE) MCG/ACT INHALER    Inhale 2 puffs into the lungs every 6 hours as needed for Wheezing    ALENDRONATE (FOSAMAX) 70 MG TABLET    Take 70 mg by mouth every 7 days    ANASTROZOLE (ARIMIDEX) 1 MG TABLET    Take 1 mg by mouth every evening     BISMUTH SUBSALICYLATE 824 MG TABS bismuth subsalicylate 996 MG Oral Tablet    prn    Active    CALCIUM CITRATE-VITAMIN D (CITRICAL + D) 315-250 MG-UNIT TABS PER TABLET    Take 1 tablet by mouth every morning (before breakfast)    CLONAZEPAM (KLONOPIN) 0.5 MG TABLET    Take 0.5 mg by mouth 2 times daily. ERGOCALCIFEROL (VITAMIN D2 PO)    Take 50,000 Units by mouth Twice a Week Twice weekly     FLUTICASONE-SALMETEROL (ADVAIR HFA) 115-21 MCG/ACT INHALER    Inhale 2 puffs into the lungs 2 times daily    GABAPENTIN (NEURONTIN) 100 MG CAPSULE    Take 200 mg by mouth every evening. MIRTAZAPINE (REMERON) 15 MG TABLET    TAKE 1 TABLET BY MOUTH AT BEDTIME. OMEPRAZOLE (PRILOSEC) 20 MG DELAYED RELEASE CAPSULE    Take 20 mg by mouth daily    ONDANSETRON (ZOFRAN ODT) 4 MG DISINTEGRATING TABLET    Take 1 tablet by mouth every 8 hours as needed for Nausea    PANTOPRAZOLE (PROTONIX) 40 MG TABLET    Take 40 mg by mouth in the morning. POLYETHYLENE GLYCOL (GLYCOLAX) 17 G PACKET    Take 17 g by mouth 2 times daily    VENLAFAXINE (EFFEXOR XR) 150 MG EXTENDED RELEASE CAPSULE    Take 150 mg by mouth every evening          ALLERGIES     Latex    FAMILYHISTORY       Family History   Problem Relation Age of Onset    Cancer Mother     Heart Disease Father           SOCIAL HISTORY       Social History     Tobacco Use    Smoking status: Former     Years: 5.00     Types: Cigarettes    Smokeless tobacco: Never   Vaping Use    Vaping Use: Never used   Substance Use Topics    Alcohol use: No    Drug use: No       SCREENINGS    [place] Coma Scale  Eye Opening: Spontaneous  Best Verbal Response: Oriented  Best Motor Response: Obeys commands  [place] Coma Scale Score: 15        PHYSICAL EXAM    (up to 7 for level 4, 8 or more for level 5)     ED Triage Vitals [11/08/22 1731]   BP Temp Temp Source Heart Rate Resp SpO2 Height Weight   (!) 185/74 98 °F (36.7 °C) Oral 67 17 95 % 5' 4\" (1.626 m) 131 lb 2.8 oz (59.5 kg)       Physical Exam  Vitals and nursing note reviewed. Constitutional:       General: She is not in acute distress. Appearance: Normal appearance. She is not ill-appearing. HENT:      Head: Normocephalic and atraumatic. Right Ear: External ear normal.      Left Ear: External ear normal.      Nose: Nose normal.      Mouth/Throat:      Pharynx: Oropharynx is clear. Eyes:      General:         Right eye: No discharge. Left eye: No discharge. Extraocular Movements: Extraocular movements intact. Conjunctiva/sclera: Conjunctivae normal.      Pupils: Pupils are equal, round, and reactive to light. Cardiovascular:      Rate and Rhythm: Normal rate and regular rhythm. Pulses: Normal pulses. Heart sounds: Normal heart sounds. Pulmonary:      Effort: Pulmonary effort is normal. No respiratory distress. Breath sounds: Normal breath sounds. Abdominal:      General: Bowel sounds are normal.      Palpations: Abdomen is soft. Tenderness: There is no abdominal tenderness. There is no right CVA tenderness or left CVA tenderness. Musculoskeletal:         General: Normal range of motion. Cervical back: Normal range of motion and neck supple. No rigidity or tenderness. Right lower leg: No edema. Left lower leg: No edema. Skin:     General: Skin is warm and dry. Capillary Refill: Capillary refill takes less than 2 seconds. Neurological:      General: No focal deficit present. Mental Status: She is alert and oriented to person, place, and time. Psychiatric:         Mood and Affect: Mood normal.         Behavior: Behavior normal.         Thought Content:  Thought content normal.         Judgment: Judgment normal.       DIAGNOSTIC RESULTS   LABS:    Labs Reviewed   CBC WITH AUTO DIFFERENTIAL - Abnormal; Notable for the following components:       Result Value    RBC 3.91 (*)     All other components within normal limits   URINALYSIS WITH REFLEX TO CULTURE   COMPREHENSIVE METABOLIC PANEL W/ REFLEX TO MG FOR LOW K       When ordered only abnormal lab results are displayed. All other labs were within normal range or not returned as of this dictation. EKG: When ordered, EKG's are interpreted by the Emergency Department Physician in the absence of a cardiologist.  Please see their note for interpretation of EKG. RADIOLOGY:   Non-plain film images such as CT, Ultrasound and MRI are read by the radiologist. Plain radiographic images are visualized and preliminarily interpreted by the ED Provider with the below findings:        Interpretation per the Radiologist below, if available at the time of this note:    No orders to display     No results found. PROCEDURES   Unless otherwise noted below, none     Procedures    CRITICAL CARE TIME   none    CONSULTS:  None      EMERGENCY DEPARTMENT COURSE and DIFFERENTIAL DIAGNOSIS/MDM:   Vitals:    Vitals:    11/08/22 1731   BP: (!) 185/74   Pulse: 67   Resp: 17   Temp: 98 °F (36.7 °C)   TempSrc: Oral   SpO2: 95%   Weight: 131 lb 2.8 oz (59.5 kg)   Height: 5' 4\" (1.626 m)       Patient was given the following medications:  Medications - No data to display      Is this patient to be included in the SEP-1 Core Measure due to severe sepsis or septic shock? No   Exclusion criteria - the patient is NOT to be included for SEP-1 Core Measure due to: Infection is not suspected    Previous records reviewed in order to gain further information regarding patient's PMH is well as her HPI. Nursing notes reviewed. This is an 82-year-old female who presents to the emergency department today reporting urinary frequency especially at night. Patient reports having to get up multiple times during the night to change her depends. Patient states that last night she became concerned because she had an episode of left-sided flank pain. Patient states that flank pain did resolve without intervention. Physical exam complete. Patient arrives nontoxic, afebrile, hypertensive.   Patient denies known history of hypertension. Patient denies having any headache, chest pain, or trouble breathing. Baseline laboratory studies obtained. Kidney function is within normal limits. Urinalysis is negative for infection. Patient reassessed. She has remained stable throughout ED visit. At this time there is no evidence of any life-threatening or emergent conditions requiring immediate intervention. Low suspicion for sepsis, pyelonephritis, obstructive uropathy, or other acute pathology. Patient is given urology referral and agrees to follow-up as directed. She also agrees to follow-up with her PCP within the next 2 to 3 days. She agrees return for high fever, incessant vomiting, severe pain, any other worsening symptoms. Patient is discharged home in stable condition. FINAL IMPRESSION      1. Nocturia    2. Flank pain    3. Elevated systolic blood pressure reading without diagnosis of hypertension          DISPOSITION/PLAN   DISPOSITION Decision To Discharge 11/08/2022 07:38:27 PM      PATIENT REFERRED TO:  Gauri Mark, [address] [place] [address] [id number]  [facility]  [phone]    In 1 day  urologist    Levy Adams.  [facility] [id number]  [address] [facility] [place] [address]  [phone]    In 2 days  As needed    DISCHARGE MEDICATIONS:  New Prescriptions    No medications on file       DISCONTINUED MEDICATIONS:  Discontinued Medications    No medications on file              (Please note that portions of this note were completed with a voice recognition program.  Efforts were made to edit the dictations but occasionally words are mis-transcribed.)    LATASHA Gomes CNP (electronically signed)            LATASHA Gomes CNP  11/08/22 1942

## 2023-02-13 ENCOUNTER — APPOINTMENT (OUTPATIENT)
Dept: GENERAL RADIOLOGY | Age: 88
End: 2023-02-13
Payer: MEDICARE

## 2023-02-13 ENCOUNTER — HOSPITAL ENCOUNTER (EMERGENCY)
Age: 88
Discharge: HOME | End: 2023-02-14
Payer: MEDICARE

## 2023-02-13 DIAGNOSIS — S20.211A RIB CONTUSION, RIGHT, INITIAL ENCOUNTER: Primary | ICD-10-CM

## 2023-02-13 PROCEDURE — 71101 X-RAY EXAM UNILAT RIBS/CHEST: CPT

## 2023-02-13 PROCEDURE — 99283 EMERGENCY DEPT VISIT LOW MDM: CPT

## 2023-02-13 RX ORDER — LIDOCAINE 4 G/G
1 PATCH TOPICAL ONCE
Status: DISCONTINUED | OUTPATIENT
Start: 2023-02-14 | End: 2023-02-14 | Stop reason: HOSPADM

## 2023-02-13 RX ORDER — ACETAMINOPHEN 500 MG
1000 TABLET ORAL ONCE
Status: COMPLETED | OUTPATIENT
Start: 2023-02-14 | End: 2023-02-14

## 2023-02-14 VITALS
BODY MASS INDEX: 22.52 KG/M2 | HEIGHT: 64 IN | DIASTOLIC BLOOD PRESSURE: 73 MMHG | RESPIRATION RATE: 18 BRPM | TEMPERATURE: 97 F | HEART RATE: 73 BPM | SYSTOLIC BLOOD PRESSURE: 133 MMHG | OXYGEN SATURATION: 97 %

## 2023-02-14 PROCEDURE — 6370000000 HC RX 637 (ALT 250 FOR IP)

## 2023-02-14 RX ORDER — LIDOCAINE 50 MG/G
1 PATCH TOPICAL DAILY
Qty: 10 PATCH | Refills: 0 | Status: SHIPPED | OUTPATIENT
Start: 2023-02-14 | End: 2023-02-24

## 2023-02-14 RX ADMIN — ACETAMINOPHEN 1000 MG: 500 TABLET ORAL at 00:09

## 2023-02-14 RX ADMIN — IBUPROFEN 600 MG: 400 TABLET ORAL at 00:09

## 2023-02-14 ASSESSMENT — ENCOUNTER SYMPTOMS
ABDOMINAL PAIN: 0
VOMITING: 0
SORE THROAT: 0
DIARRHEA: 0
RHINORRHEA: 0
BACK PAIN: 0
EYE PAIN: 0
SHORTNESS OF BREATH: 0
COUGH: 0
CONSTIPATION: 0
NAUSEA: 0

## 2023-02-14 ASSESSMENT — PAIN DESCRIPTION - LOCATION: LOCATION: RIB CAGE

## 2023-02-14 ASSESSMENT — PAIN DESCRIPTION - ORIENTATION: ORIENTATION: RIGHT

## 2023-02-14 ASSESSMENT — PAIN SCALES - GENERAL: PAINLEVEL_OUTOF10: 9

## 2023-02-14 NOTE — DISCHARGE INSTRUCTIONS
Your x-ray today did not show any rib fracture. Please continue to use the incentive spirometer and lidocaine patches he has we discussed. Please take over-the-counter Tylenol/Motrin as directed by the bottle. Please call your family doctor first thing in the morning and discuss your fall and ED results with them.   Return to The ED for any worsening symptoms

## 2023-02-14 NOTE — ED PROVIDER NOTES
[address]        Pt Name: Mark Malloy  MRN: [id number]  [place] 1935  Date of evaluation: 2/13/2023  Provider: LATASHA Mena - FLOYD  PCP: Stacy Garzon MD  Note Started: 12:52 AM EST 2/14/23      FLOR. I have evaluated this patient. My supervising physician was available for consultation. CHIEF COMPLAINT       Chief Complaint   Patient presents with    Fall     States her  was falling and she attempted to catch him; states hit R side of ribs on walker handle and thinks it may be broken; denies blood thinners       HISTORY OF PRESENT ILLNESS: 1 or more Elements     History From: Patient, daughter  Limitations to history : Patient is hard of hearing    Mark Malloy is a 82 y.o. female who presents to the ED with concern for fall this afternoon. The patient was with her  and was stepping off a curb. The  lost his balance pushing the patient onto his walker. She hit her right lateral ribs against the handle of his walker and this afternoon and has had pain since. Pain is pleuritic. Concerned about a broken rib. Nursing Notes were all reviewed and agreed with or any disagreements were addressed in the HPI. REVIEW OF SYSTEMS :      Review of Systems   Constitutional:  Negative for chills, diaphoresis and fever. HENT:  Negative for congestion, rhinorrhea and sore throat. Eyes:  Negative for pain and visual disturbance. Respiratory:  Negative for cough and shortness of breath. Cardiovascular:  Negative for chest pain and leg swelling. Gastrointestinal:  Negative for abdominal pain, constipation, diarrhea, nausea and vomiting. Genitourinary:  Negative for frequency and hematuria. Musculoskeletal:  Positive for arthralgias. Negative for back pain and neck pain. Skin:  Negative for rash and wound. Neurological:  Negative for dizziness and light-headedness.      Positives and Pertinent negatives as per HPI. SURGICAL HISTORY     Past Surgical History:   Procedure Laterality Date     SECTION       SECTION      x`s    KNEE SURGERY Left 2020    INCISION AND DRAINAGE LEFT KNEE performed by Rick Flannery MD at [facility] 2021    EGD BIOPSY performed by Arden Cheadle, MD at [address] [place] [address]  2021    [address] [facility] 2021 [facility]       Discharge Medication List as of 2023 12:14 AM        CONTINUE these medications which have NOT CHANGED    Details   ondansetron (ZOFRAN ODT) 4 MG disintegrating tablet Take 1 tablet by mouth every 8 hours as needed for Nausea, Disp-20 tablet, R-0Normal      Bismuth Subsalicylate 749 MG TABS bismuth subsalicylate 247 MG Oral Tablet    prn    ActiveHistorical Med      pantoprazole (PROTONIX) 40 MG tablet Take 40 mg by mouth in the morning. Historical Med      mirtazapine (REMERON) 15 MG tablet TAKE 1 TABLET BY MOUTH AT BEDTIME. Historical Med      calcium citrate-vitamin D (CITRICAL + D) 315-250 MG-UNIT TABS per tablet Take 1 tablet by mouth every morning (before breakfast)Historical Med      gabapentin (NEURONTIN) 100 MG capsule Take 200 mg by mouth every evening. Historical Med      polyethylene glycol (GLYCOLAX) 17 g packet Take 17 g by mouth 2 times dailyHistorical Med      omeprazole (PRILOSEC) 20 MG delayed release capsule Take 20 mg by mouth dailyHistorical Med      anastrozole (ARIMIDEX) 1 MG tablet Take 1 mg by mouth every evening Historical Med      albuterol sulfate  (90 Base) MCG/ACT inhaler Inhale 2 puffs into the lungs every 6 hours as needed for WheezingHistorical Med      fluticasone-salmeterol (ADVAIR HFA) 115-21 MCG/ACT inhaler Inhale 2 puffs into the lungs 2 times dailyHistorical Med      Ergocalciferol (VITAMIN D2 PO) Take 50,000 Units by mouth Twice a Week Twice weekly Historical Med      alendronate (FOSAMAX) 70 MG tablet Take 70 mg by mouth every 7 daysHistorical Med      clonazePAM (KLONOPIN) 0.5 MG tablet Take 0.5 mg by mouth 2 times daily. Historical Med      venlafaxine (EFFEXOR XR) 150 MG extended release capsule Take 150 mg by mouth every evening Historical Med             ALLERGIES     Latex    FAMILYHISTORY       Family History   Problem Relation Age of Onset    Cancer Mother     Heart Disease Father         SOCIAL HISTORY       Social History     Tobacco Use    Smoking status: Former     Years: 5.00     Types: Cigarettes    Smokeless tobacco: Never   Vaping Use    Vaping Use: Never used   Substance Use Topics    Alcohol use: No    Drug use: No       SCREENINGS        Varun Coma Scale  Eye Opening: Spontaneous  Best Verbal Response: Oriented  Best Motor Response: Obeys commands  [place] Coma Scale Score: 15                CIWA Assessment  BP: 133/73  Heart Rate: 73           PHYSICAL EXAM  1 or more Elements     ED Triage Vitals [02/13/23 2231]   BP Temp Temp Source Heart Rate Resp SpO2 Height Weight   132/76 97 °F (36.1 °C) Oral 65 16 95 % 5' 4\" (1.626 m) --       Physical Exam  Vitals and nursing note reviewed. Constitutional:       Appearance: Normal appearance. She is normal weight. She is not ill-appearing, toxic-appearing or diaphoretic. HENT:      Head: Normocephalic and atraumatic. Right Ear: External ear normal.      Left Ear: External ear normal.      Nose: Nose normal. No congestion or rhinorrhea. Mouth/Throat:      Mouth: Mucous membranes are moist.      Pharynx: Oropharynx is clear. No oropharyngeal exudate or posterior oropharyngeal erythema. Eyes:      General: No scleral icterus. Right eye: No discharge. Left eye: No discharge. Extraocular Movements: Extraocular movements intact. Conjunctiva/sclera: Conjunctivae normal.      Pupils: Pupils are equal, round, and reactive to light.    Cardiovascular:      Rate and Rhythm: Normal rate and regular rhythm. Pulses: Normal pulses. Heart sounds: Normal heart sounds. No murmur heard. No friction rub. No gallop. Pulmonary:      Effort: Pulmonary effort is normal. No respiratory distress. Breath sounds: Normal breath sounds. No stridor. No wheezing, rhonchi or rales. Chest:          Comments: She is tender to palpation to the right lateral ribs around 9 through 11. No bruising noted. Good air movement  Abdominal:      General: Abdomen is flat. Bowel sounds are normal. There is no distension. Palpations: Abdomen is soft. Tenderness: There is no abdominal tenderness. There is no right CVA tenderness, left CVA tenderness or guarding. Musculoskeletal:         General: Normal range of motion. Cervical back: Normal range of motion and neck supple. No rigidity or tenderness. Skin:     General: Skin is warm and dry. Capillary Refill: Capillary refill takes less than 2 seconds. Coloration: Skin is not jaundiced or pale. Findings: No rash. Neurological:      General: No focal deficit present. Mental Status: She is alert and oriented to person, place, and time. Mental status is at baseline. Psychiatric:         Mood and Affect: Mood normal.         Behavior: Behavior normal.         DIAGNOSTIC RESULTS   LABS:    Labs Reviewed - No data to display    When ordered only abnormal lab results are displayed. All other labs were within normal range or not returned as of this dictation. EKG: When ordered, EKG's are interpreted by the Emergency Department Physician in the absence of a cardiologist.  Please see their note for interpretation of EKG.     RADIOLOGY:   Non-plain film images such as CT, Ultrasound and MRI are read by the radiologist. Plain radiographic images are visualized and preliminarily interpreted by the ED Provider with the below findings:        Interpretation per the Radiologist below, if available at the time of this note:    XR [address] CHEST (MIN 3 VIEWS)   Final Result   No acute abnormality seen and no rib fracture identified. XR RIBS RIGHT INCLUDE CHEST (MIN 3 VIEWS)    Result Date: 2/13/2023  EXAMINATION: 5 XRAY VIEWS OF THE RIGHT RIBS WITH FRONTAL XRAY VIEW OF THE CHEST 2/13/2023 11:03 pm COMPARISON: None. HISTORY: ORDERING SYSTEM PROVIDED HISTORY: rib injury TECHNOLOGIST PROVIDED HISTORY: Reason for exam:->rib injury Reason for Exam: fell into husbands walker handle, hurts mid to lower right rib area FINDINGS: The bones are osteopenic. No fracture is seen. The ribs are intact. The heart is normal.  The lungs are clear. No consolidation, effusion, or pneumothorax is seen. There are calcified granulomas right lung base. No acute abnormality seen and no rib fracture identified. No results found. PROCEDURES   Unless otherwise noted below, none     Procedures    CRITICAL CARE TIME (.cctime)       PAST MEDICAL HISTORY      has a past medical history of Asthma, Cancer ([facility] [address] CKD (chronic kidney disease), COVID, Depression, GERD (gastroesophageal reflux disease), Neuropathy, Osteoporosis, Pernicious anemia, S/P thyroid biopsy, and Scoliosis. EMERGENCY DEPARTMENT COURSE and DIFFERENTIAL DIAGNOSIS/MDM:   Vitals:    Vitals:    02/13/23 2231 02/14/23 0015   BP: 132/76 133/73   Pulse: 65 73   Resp: 16 18   Temp: 97 °F (36.1 °C)    TempSrc: Oral    SpO2: 95% 97%   Height: 5' 4\" (1.626 m)        Patient was given the following medications:  Medications   lidocaine 4 % external patch 1 patch (1 patch TransDERmal Patch Applied 2/14/23 0011)   acetaminophen (TYLENOL) tablet 1,000 mg (1,000 mg Oral Given 2/14/23 0009)   ibuprofen (ADVIL;MOTRIN) tablet 600 mg (600 mg Oral Given 2/14/23 0009)             Is this patient to be included in the SEP-1 Core Measure due to severe sepsis or septic shock? No   Exclusion criteria - the patient is NOT to be included for SEP-1 Core Measure due to:   Infection is not suspected    Chronic Conditions affecting care:    has a past medical history of Asthma, Cancer ([address] CKD (chronic kidney disease), COVID, Depression, GERD (gastroesophageal reflux disease), Neuropathy, Osteoporosis, Pernicious anemia, S/P thyroid biopsy, and Scoliosis. CONSULTS: (Who and What was discussed)  None          Records Reviewed (External and Source)     CC/HPI Summary, DDx, ED Course, and Reassessment:     Differential Diagnosis: Rib fractures, Pulmonary Contusion, Cardiac contusion, Pneumothorax, Pneumomediastinum, Hemothorax, other. Patient's vitals are stable. She does have pleuritic pain on inspiration. As such imaging is obtained. No acute fractures as above. She is given dose of Tylenol, Motrin and a lidocaine patch and is doing well in the emergency department. She was given an incentive spirometer and taught how to use it. No flail chest.  No hypoxia. No other injuries noted. No abdominal pain, no hematuria as such low concern for liver, spleen or kidney injury at this time. Return precautions discussed. Patient and daughter agreeable    This is a very pleasant patient who unfortunately has sustained a probable rib fracture. They do not have a flail chest on exam and are not hypoxic. On head to toe exam there are no other significant injuries found. On review of their x-rays, no pneumothorax or significant pleural effusion was seen. Being that their abdominal exam is completely benign and there is no report of hematuria, I do not suspect a liver, splenic or renal injury at this time. There is no significant evidence of any process requiring immediate surgical intervention or admission at this time. The patient was counseled that splinting is not indicated for this type of injury and instead they are to do incentive spirometry throughout the day to decrease the risk of complications such as pneumonia.     The patient is at low risk for mortality based on demographic, history and clinical factors. Given the best available information and clinical assessment, I estimate the risk of hospitalization to be greater than risk of treatment at home. I have explained to the patient that the risk could rapidly change, given precautions for return and instructions. Explained to patient that the risk for mortality is low based on demographic, history and clinical factors. I discussed with patient the results of evaluation in the ED, diagnosis, care, and prognosis. The plan is to discharge to home. Patient is in agreement with plan and questions have been answered. I also discussed with patient the reasons which may require a return visit and the importance of follow-up care. The patient is well-appearing, nontoxic, and improved at the time of discharge. Patient agrees to call to arrange follow-up care as directed. Patient understands to return immediately for worsening/change in symptoms. Disposition Considerations (tests considered but not done, Admit vs D/C, Shared Decision Making, Pt Expectation of Test or Tx.):       I am the Primary Clinician of Record. FINAL IMPRESSION      1. Rib contusion, right, initial encounter          DISPOSITION/PLAN     DISPOSITION Decision To Discharge 02/14/2023 12:01:57 AM      PATIENT REFERRED TO:  Marquis Banks.  [facility] [id number]  [facility] [id number]    Call in 1 day  Follow up on your recent ED visit      DISCHARGE MEDICATIONS:  Discharge Medication List as of 2/14/2023 12:14 AM        START taking these medications    Details   lidocaine (LIDODERM) 5 % Place 1 patch onto the skin daily for 10 days 12 hours on, 12 hours off., Disp-10 patch, R-0Normal             DISCONTINUED MEDICATIONS:  Discharge Medication List as of 2/14/2023 12:14 AM                 (Please note that portions of this note were completed with a voice recognition program.  Efforts were made to edit the dictations but occasionally words are mis-transcribed.)    LATASHA Lambert CNP (electronically signed)       LATASHA Lambert CNP  02/14/23 0101

## 2023-04-05 ENCOUNTER — HOSPITAL ENCOUNTER (OUTPATIENT)
Dept: ULTRASOUND IMAGING | Age: 88
Discharge: HOME | End: 2023-04-05
Payer: MEDICARE

## 2023-04-05 DIAGNOSIS — R32 URINARY INCONTINENCE, UNSPECIFIED TYPE: ICD-10-CM

## 2023-04-05 DIAGNOSIS — R35.1 NOCTURIA: ICD-10-CM

## 2023-04-05 PROCEDURE — 76770 US EXAM ABDO BACK WALL COMP: CPT

## 2023-09-25 ENCOUNTER — OFFICE VISIT (OUTPATIENT)
Dept: BREAST CENTER | Age: 88
End: 2023-09-25

## 2023-09-25 ENCOUNTER — HOSPITAL ENCOUNTER (OUTPATIENT)
Dept: WOMENS IMAGING | Age: 88
Discharge: HOME | End: 2023-09-25
Payer: MEDICARE

## 2023-09-25 VITALS
BODY MASS INDEX: 22.2 KG/M2 | DIASTOLIC BLOOD PRESSURE: 84 MMHG | HEART RATE: 91 BPM | HEIGHT: 64 IN | RESPIRATION RATE: 16 BRPM | SYSTOLIC BLOOD PRESSURE: 139 MMHG | OXYGEN SATURATION: 97 % | WEIGHT: 130 LBS

## 2023-09-25 DIAGNOSIS — Z90.12 S/P PARTIAL MASTECTOMY, LEFT: ICD-10-CM

## 2023-09-25 DIAGNOSIS — Z85.3 HISTORY OF BREAST CANCER: Primary | ICD-10-CM

## 2023-09-25 DIAGNOSIS — Z12.31 BREAST CANCER SCREENING BY MAMMOGRAM: ICD-10-CM

## 2023-09-25 DIAGNOSIS — Z12.31 ENCOUNTER FOR SCREENING MAMMOGRAM FOR BREAST CANCER: ICD-10-CM

## 2023-09-25 PROCEDURE — 77067 SCR MAMMO BI INCL CAD: CPT

## 2023-09-25 RX ORDER — MELOXICAM 15 MG/1
15 TABLET ORAL DAILY
COMMUNITY
Start: 2023-07-03

## 2023-09-25 NOTE — PATIENT INSTRUCTIONS
Mammogram reviewed, no concerning findings  Breast exam performed, no palpable masses. Continue self breast exams    Healthy Lifestyle Recommendations: healthy diet (decrease consumption of red meat, increase fresh fruits and vegetables), decreased alcohol consumption (less than 4 drinks/week), adequate sleep (goal 6-8 hours), routine exercise (goal 150 minutes/week or greater), weight control.      Return:Around 9/26/24 bilateral screen mammogram and breast exam  Discuss with SOM Paula about if it's ok to stop the anastrozole medication

## 2023-09-25 NOTE — PROGRESS NOTES
2023    Chief Complaint   Patient presents with    1 Year Follow Up     Breast exam        HPI Giovanni Noguera is here for annual follow up for breast check secondary to personal history of breast cancer. She is s/p left lumpectomy, snbx, Andi Jos 3/2018. 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 Microinvasion had been noted on prior bx. TisN0. ER/AZ positive. Postop radiation. On Arimidex and tolerating it well. She has no breast related concerns today. She states that she does not perform routine self breast evaluations and has not noticed any new abnormalities such as masses, skin changes, color changes, nipple discharge, or changes to the nipple-areolar complex.      Bilateral screening mammogram today BIRADS 1B         Current Outpatient Medications:     meloxicam (MOBIC) 15 MG tablet, Take 1 tablet by mouth daily, Disp: , Rfl:     mirabegron (MYRBETRIQ) 50 MG TB24, SMARTSI By Mouth Daily, Disp: , Rfl:     ondansetron (ZOFRAN ODT) 4 MG disintegrating tablet, Take 1 tablet by mouth every 8 hours as needed for Nausea, Disp: 20 tablet, Rfl: 0    Bismuth Subsalicylate 234 MG TABS, bismuth subsalicylate 217 MG Oral Tablet    prn    Active, Disp: , Rfl:     pantoprazole (PROTONIX) 40 MG tablet, Take 1 tablet by mouth daily, Disp: , Rfl:     mirtazapine (REMERON) 15 MG tablet, TAKE 1 TABLET BY MOUTH AT BEDTIME., Disp: , Rfl:     calcium citrate-vitamin D (CITRICAL + D) 315-250 MG-UNIT TABS per tablet, Take 1 tablet by mouth every morning (before breakfast), Disp: , Rfl:     gabapentin (NEURONTIN) 100 MG capsule, Take 2 capsules by mouth every evening., Disp: , Rfl:     polyethylene glycol (GLYCOLAX) 17 g packet, Take 1 packet by mouth 2 times daily, Disp: , Rfl:     omeprazole (PRILOSEC) 20 MG delayed release capsule, Take 1 capsule by mouth daily, Disp: , Rfl:     anastrozole (ARIMIDEX) 1 MG tablet, Take 1 tablet by mouth every evening, Disp: , Rfl:     albuterol sulfate  (90 Base) MCG/ACT

## 2023-10-06 ENCOUNTER — TELEPHONE (OUTPATIENT)
Dept: BREAST CENTER | Age: 88
End: 2023-10-06

## 2023-10-06 NOTE — TELEPHONE ENCOUNTER
Called Daughter Murali Plan- [phone] to schedule appt.   Appointment has been scheduled & mammogram for 09/30/24  1:40pm mammogram Dr. General Gunderson following     If patient can not come on this date please reschedule the next week   Thank you

## 2024-09-11 NOTE — TELEPHONE ENCOUNTER
Daughter called back, appointment confirmed Breath Sounds equal & clear to percussion & auscultation, no accessory muscle use

## 2024-10-22 ENCOUNTER — HOSPITAL ENCOUNTER (INPATIENT)
Age: 89
LOS: 3 days | Discharge: NURSING, REHAB OR LONG-TERM CARE | End: 2024-10-25
Attending: STUDENT IN AN ORGANIZED HEALTH CARE EDUCATION/TRAINING PROGRAM | Admitting: INTERNAL MEDICINE
Payer: MEDICARE

## 2024-10-22 ENCOUNTER — APPOINTMENT (OUTPATIENT)
Dept: CT IMAGING | Age: 89
End: 2024-10-22
Payer: MEDICARE

## 2024-10-22 ENCOUNTER — APPOINTMENT (OUTPATIENT)
Dept: GENERAL RADIOLOGY | Age: 89
End: 2024-10-22
Payer: MEDICARE
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
## 2024-10-22 DIAGNOSIS — R55 PRE-SYNCOPE: ICD-10-CM

## 2024-10-22 DIAGNOSIS — R55 SYNCOPE, UNSPECIFIED SYNCOPE TYPE: ICD-10-CM

## 2024-10-22 DIAGNOSIS — S32.009A LUMBAR TRANSVERSE PROCESS FRACTURE, CLOSED, INITIAL ENCOUNTER (HCC): ICD-10-CM

## 2024-10-22 DIAGNOSIS — R42 DIZZINESS: ICD-10-CM

## 2024-10-22 LAB
ALBUMIN SERPL-MCNC: 3.7 G/DL (ref 3.4–5)
ALBUMIN/GLOB SERPL: 1.5 {RATIO} (ref 1.1–2.2)
ALP SERPL-CCNC: 74 U/L (ref 40–129)
ALT SERPL-CCNC: 13 U/L (ref 10–40)
ANION GAP SERPL CALCULATED.3IONS-SCNC: 8 MMOL/L (ref 3–16)
AST SERPL-CCNC: 26 U/L (ref 15–37)
BASOPHILS # BLD: 0 K/UL (ref 0–0.2)
BASOPHILS NFR BLD: 0.6 %
BILIRUB SERPL-MCNC: 0.4 MG/DL (ref 0–1)
BUN SERPL-MCNC: 23 MG/DL (ref 7–20)
CALCIUM SERPL-MCNC: 10.5 MG/DL (ref 8.3–10.6)
CHLORIDE SERPL-SCNC: 106 MMOL/L (ref 99–110)
CO2 SERPL-SCNC: 28 MMOL/L (ref 21–32)
CREAT SERPL-MCNC: 1 MG/DL (ref 0.6–1.2)
CRP SERPL-MCNC: <3 MG/L (ref 0–5.1)
DEPRECATED RDW RBC AUTO: 14.5 % (ref 12.4–15.4)
EOSINOPHIL # BLD: 0.2 K/UL (ref 0–0.6)
EOSINOPHIL NFR BLD: 3.6 %
ERYTHROCYTE [SEDIMENTATION RATE] IN BLOOD BY WESTERGREN METHOD: 19 MM/HR (ref 0–30)
GFR SERPLBLD CREATININE-BSD FMLA CKD-EPI: 54 ML/MIN/{1.73_M2}
GLUCOSE BLD-MCNC: 78 MG/DL (ref 70–99)
GLUCOSE SERPL-MCNC: 79 MG/DL (ref 70–99)
HCT VFR BLD AUTO: 37.3 % (ref 36–48)
HGB BLD-MCNC: 12.2 G/DL (ref 12–16)
INR PPP: 0.99 (ref 0.85–1.15)
LACTATE BLDV-SCNC: 1.1 MMOL/L (ref 0.4–2)
LYMPHOCYTES # BLD: 1 K/UL (ref 1–5.1)
LYMPHOCYTES NFR BLD: 20.6 %
MAGNESIUM SERPL-MCNC: 2.24 MG/DL (ref 1.8–2.4)
MCH RBC QN AUTO: 31.5 PG (ref 26–34)
MCHC RBC AUTO-ENTMCNC: 32.8 G/DL (ref 31–36)
MCV RBC AUTO: 95.9 FL (ref 80–100)
MONOCYTES # BLD: 0.7 K/UL (ref 0–1.3)
MONOCYTES NFR BLD: 14.3 %
NEUTROPHILS # BLD: 3.1 K/UL (ref 1.7–7.7)
NEUTROPHILS NFR BLD: 60.9 %
NT-PROBNP SERPL-MCNC: 432 PG/ML (ref 0–449)
PERFORMED ON: NORMAL
PLATELET # BLD AUTO: 148 K/UL (ref 135–450)
PMV BLD AUTO: 9 FL (ref 5–10.5)
POTASSIUM SERPL-SCNC: 4.8 MMOL/L (ref 3.5–5.1)
POTASSIUM SERPL-SCNC: 4.8 MMOL/L (ref 3.5–5.1)
PROT SERPL-MCNC: 6.1 G/DL (ref 6.4–8.2)
PROTHROMBIN TIME: 13.3 SEC (ref 11.9–14.9)
RBC # BLD AUTO: 3.89 M/UL (ref 4–5.2)
SODIUM SERPL-SCNC: 142 MMOL/L (ref 136–145)
TROPONIN, HIGH SENSITIVITY: 45 NG/L (ref 0–14)
TROPONIN, HIGH SENSITIVITY: 45 NG/L (ref 0–14)
WBC # BLD AUTO: 5.1 K/UL (ref 4–11)

## 2024-10-22 PROCEDURE — 85610 PROTHROMBIN TIME: CPT

## 2024-10-22 PROCEDURE — 99285 EMERGENCY DEPT VISIT HI MDM: CPT

## 2024-10-22 PROCEDURE — 70450 CT HEAD/BRAIN W/O DYE: CPT

## 2024-10-22 PROCEDURE — 6370000000 HC RX 637 (ALT 250 FOR IP): Performed by: INTERNAL MEDICINE

## 2024-10-22 PROCEDURE — 72125 CT NECK SPINE W/O DYE: CPT

## 2024-10-22 PROCEDURE — 94760 N-INVAS EAR/PLS OXIMETRY 1: CPT

## 2024-10-22 PROCEDURE — 84484 ASSAY OF TROPONIN QUANT: CPT

## 2024-10-22 PROCEDURE — 93005 ELECTROCARDIOGRAM TRACING: CPT | Performed by: STUDENT IN AN ORGANIZED HEALTH CARE EDUCATION/TRAINING PROGRAM

## 2024-10-22 PROCEDURE — 83880 ASSAY OF NATRIURETIC PEPTIDE: CPT

## 2024-10-22 PROCEDURE — 86140 C-REACTIVE PROTEIN: CPT

## 2024-10-22 PROCEDURE — 70498 CT ANGIOGRAPHY NECK: CPT

## 2024-10-22 PROCEDURE — 6360000002 HC RX W HCPCS: Performed by: INTERNAL MEDICINE

## 2024-10-22 PROCEDURE — 71250 CT THORAX DX C-: CPT

## 2024-10-22 PROCEDURE — 80053 COMPREHEN METABOLIC PANEL: CPT

## 2024-10-22 PROCEDURE — 4A03X5D MEASUREMENT OF ARTERIAL FLOW, INTRACRANIAL, EXTERNAL APPROACH: ICD-10-PCS | Performed by: INTERNAL MEDICINE

## 2024-10-22 PROCEDURE — 71045 X-RAY EXAM CHEST 1 VIEW: CPT

## 2024-10-22 PROCEDURE — 6360000004 HC RX CONTRAST MEDICATION: Performed by: STUDENT IN AN ORGANIZED HEALTH CARE EDUCATION/TRAINING PROGRAM

## 2024-10-22 PROCEDURE — 94640 AIRWAY INHALATION TREATMENT: CPT

## 2024-10-22 PROCEDURE — 83735 ASSAY OF MAGNESIUM: CPT

## 2024-10-22 PROCEDURE — 6370000000 HC RX 637 (ALT 250 FOR IP): Performed by: NURSE PRACTITIONER

## 2024-10-22 PROCEDURE — 99223 1ST HOSP IP/OBS HIGH 75: CPT | Performed by: INTERNAL MEDICINE

## 2024-10-22 PROCEDURE — 2580000003 HC RX 258: Performed by: INTERNAL MEDICINE

## 2024-10-22 PROCEDURE — 85025 COMPLETE CBC W/AUTO DIFF WBC: CPT

## 2024-10-22 PROCEDURE — 85652 RBC SED RATE AUTOMATED: CPT

## 2024-10-22 PROCEDURE — 1200000000 HC SEMI PRIVATE

## 2024-10-22 PROCEDURE — 83605 ASSAY OF LACTIC ACID: CPT

## 2024-10-22 RX ORDER — HYDRALAZINE HYDROCHLORIDE 25 MG/1
25 TABLET, FILM COATED ORAL ONCE
Status: COMPLETED | OUTPATIENT
Start: 2024-10-22 | End: 2024-10-22

## 2024-10-22 RX ORDER — ACETAMINOPHEN 325 MG/1
650 TABLET ORAL
COMMUNITY

## 2024-10-22 RX ORDER — POTASSIUM CHLORIDE 7.45 MG/ML
10 INJECTION INTRAVENOUS PRN
Status: ACTIVE | OUTPATIENT
Start: 2024-10-22 | End: 2024-10-24

## 2024-10-22 RX ORDER — ONDANSETRON 2 MG/ML
4 INJECTION INTRAMUSCULAR; INTRAVENOUS EVERY 6 HOURS PRN
Status: DISCONTINUED | OUTPATIENT
Start: 2024-10-22 | End: 2024-10-25 | Stop reason: HOSPADM

## 2024-10-22 RX ORDER — OMEPRAZOLE 40 MG/1
40 CAPSULE, DELAYED RELEASE ORAL DAILY
COMMUNITY

## 2024-10-22 RX ORDER — MAGNESIUM SULFATE IN WATER 40 MG/ML
2000 INJECTION, SOLUTION INTRAVENOUS PRN
Status: DISCONTINUED | OUTPATIENT
Start: 2024-10-22 | End: 2024-10-25 | Stop reason: HOSPADM

## 2024-10-22 RX ORDER — ENOXAPARIN SODIUM 100 MG/ML
40 INJECTION SUBCUTANEOUS NIGHTLY
Status: DISCONTINUED | OUTPATIENT
Start: 2024-10-22 | End: 2024-10-25 | Stop reason: HOSPADM

## 2024-10-22 RX ORDER — ACETAMINOPHEN 325 MG/1
650 TABLET ORAL
Status: DISCONTINUED | OUTPATIENT
Start: 2024-10-22 | End: 2024-10-25 | Stop reason: HOSPADM

## 2024-10-22 RX ORDER — IOPAMIDOL 755 MG/ML
75 INJECTION, SOLUTION INTRAVASCULAR
Status: COMPLETED | OUTPATIENT
Start: 2024-10-22 | End: 2024-10-22

## 2024-10-22 RX ORDER — GABAPENTIN 100 MG/1
100 CAPSULE ORAL 2 TIMES DAILY
COMMUNITY

## 2024-10-22 RX ORDER — TRIAMTERENE AND HYDROCHLOROTHIAZIDE 37.5; 25 MG/1; MG/1
1 TABLET ORAL DAILY
COMMUNITY
End: 2024-10-22

## 2024-10-22 RX ORDER — POLYETHYLENE GLYCOL 3350 17 G/17G
17 POWDER, FOR SOLUTION ORAL DAILY PRN
Status: DISCONTINUED | OUTPATIENT
Start: 2024-10-22 | End: 2024-10-25 | Stop reason: HOSPADM

## 2024-10-22 RX ORDER — ERGOCALCIFEROL 1.25 MG/1
50000 CAPSULE, LIQUID FILLED ORAL
COMMUNITY
End: 2024-10-22

## 2024-10-22 RX ORDER — ACETAMINOPHEN 325 MG/1
650 TABLET ORAL EVERY 6 HOURS PRN
Status: DISCONTINUED | OUTPATIENT
Start: 2024-10-22 | End: 2024-10-25 | Stop reason: HOSPADM

## 2024-10-22 RX ORDER — SODIUM CHLORIDE 9 MG/ML
INJECTION, SOLUTION INTRAVENOUS PRN
Status: DISCONTINUED | OUTPATIENT
Start: 2024-10-22 | End: 2024-10-25 | Stop reason: HOSPADM

## 2024-10-22 RX ORDER — MIRABEGRON 50 MG/1
50 TABLET, FILM COATED, EXTENDED RELEASE ORAL DAILY
COMMUNITY

## 2024-10-22 RX ORDER — SODIUM CHLORIDE 0.9 % (FLUSH) 0.9 %
5-40 SYRINGE (ML) INJECTION PRN
Status: DISCONTINUED | OUTPATIENT
Start: 2024-10-22 | End: 2024-10-25 | Stop reason: HOSPADM

## 2024-10-22 RX ORDER — BUDESONIDE AND FORMOTEROL FUMARATE DIHYDRATE 80; 4.5 UG/1; UG/1
2 AEROSOL RESPIRATORY (INHALATION)
Status: DISCONTINUED | OUTPATIENT
Start: 2024-10-22 | End: 2024-10-25 | Stop reason: HOSPADM

## 2024-10-22 RX ORDER — ACETAMINOPHEN 650 MG/1
650 SUPPOSITORY RECTAL EVERY 6 HOURS PRN
Status: DISCONTINUED | OUTPATIENT
Start: 2024-10-22 | End: 2024-10-25 | Stop reason: HOSPADM

## 2024-10-22 RX ORDER — PANTOPRAZOLE SODIUM 40 MG/1
40 TABLET, DELAYED RELEASE ORAL
Status: DISCONTINUED | OUTPATIENT
Start: 2024-10-22 | End: 2024-10-25 | Stop reason: HOSPADM

## 2024-10-22 RX ORDER — GABAPENTIN 300 MG/1
300 CAPSULE ORAL 2 TIMES DAILY
COMMUNITY
End: 2024-10-22

## 2024-10-22 RX ORDER — POTASSIUM CHLORIDE 1500 MG/1
40 TABLET, EXTENDED RELEASE ORAL PRN
Status: ACTIVE | OUTPATIENT
Start: 2024-10-22 | End: 2024-10-24

## 2024-10-22 RX ORDER — ONDANSETRON 4 MG/1
4 TABLET, ORALLY DISINTEGRATING ORAL EVERY 8 HOURS PRN
Status: DISCONTINUED | OUTPATIENT
Start: 2024-10-22 | End: 2024-10-25 | Stop reason: HOSPADM

## 2024-10-22 RX ORDER — CLONAZEPAM 0.5 MG/1
0.5 TABLET ORAL 2 TIMES DAILY PRN
COMMUNITY

## 2024-10-22 RX ORDER — SODIUM CHLORIDE 0.9 % (FLUSH) 0.9 %
5-40 SYRINGE (ML) INJECTION EVERY 12 HOURS SCHEDULED
Status: DISCONTINUED | OUTPATIENT
Start: 2024-10-22 | End: 2024-10-25 | Stop reason: HOSPADM

## 2024-10-22 RX ORDER — ALBUTEROL SULFATE 90 UG/1
2 INHALANT RESPIRATORY (INHALATION) EVERY 6 HOURS PRN
Status: DISCONTINUED | OUTPATIENT
Start: 2024-10-22 | End: 2024-10-25 | Stop reason: HOSPADM

## 2024-10-22 RX ADMIN — IOPAMIDOL 75 ML: 755 INJECTION, SOLUTION INTRAVENOUS at 08:09

## 2024-10-22 RX ADMIN — ONDANSETRON 4 MG: 4 TABLET, ORALLY DISINTEGRATING ORAL at 21:53

## 2024-10-22 RX ADMIN — SODIUM CHLORIDE, PRESERVATIVE FREE 10 ML: 5 INJECTION INTRAVENOUS at 20:06

## 2024-10-22 RX ADMIN — CEFAZOLIN 2000 MG: 2 INJECTION, POWDER, FOR SOLUTION INTRAVENOUS at 21:44

## 2024-10-22 RX ADMIN — ACETAMINOPHEN 650 MG: 325 TABLET ORAL at 15:02

## 2024-10-22 RX ADMIN — CEFAZOLIN 2000 MG: 2 INJECTION, POWDER, FOR SOLUTION INTRAVENOUS at 16:19

## 2024-10-22 RX ADMIN — BUDESONIDE AND FORMOTEROL FUMARATE DIHYDRATE 2 PUFF: 80; 4.5 AEROSOL RESPIRATORY (INHALATION) at 19:55

## 2024-10-22 RX ADMIN — HYDRALAZINE HYDROCHLORIDE 25 MG: 25 TABLET ORAL at 20:06

## 2024-10-22 ASSESSMENT — PAIN SCALES - GENERAL
PAINLEVEL_OUTOF10: 2
PAINLEVEL_OUTOF10: 7
PAINLEVEL_OUTOF10: 5

## 2024-10-22 ASSESSMENT — PAIN DESCRIPTION - DESCRIPTORS
DESCRIPTORS: ACHING
DESCRIPTORS: ACHING

## 2024-10-22 ASSESSMENT — PAIN DESCRIPTION - LOCATION
LOCATION: HEAD
LOCATION: HEAD
LOCATION: BACK

## 2024-10-22 ASSESSMENT — PAIN - FUNCTIONAL ASSESSMENT
PAIN_FUNCTIONAL_ASSESSMENT: 0-10
PAIN_FUNCTIONAL_ASSESSMENT: ACTIVITIES ARE NOT PREVENTED

## 2024-10-22 ASSESSMENT — PAIN DESCRIPTION - FREQUENCY: FREQUENCY: CONTINUOUS

## 2024-10-22 ASSESSMENT — PAIN DESCRIPTION - ORIENTATION
ORIENTATION: ANTERIOR
ORIENTATION: MID;LOWER

## 2024-10-22 ASSESSMENT — PAIN DESCRIPTION - ONSET: ONSET: ON-GOING

## 2024-10-22 ASSESSMENT — PAIN DESCRIPTION - PAIN TYPE: TYPE: ACUTE PAIN

## 2024-10-22 NOTE — ED PROVIDER NOTES
EMERGENCY DEPARTMENT ENCOUNTER      CHIEF COMPLAINT    Fall (Pt presents to the ED with c/c of fall this morning hitting posterior head. Pt endorses gait imbalance starting this at 0600, blurry vision x2 days. Denies any blood thinner, no LOC. )      HPI    Krystina Bajwa is a 89 y.o. female with past medical history significant for osteoporosis who presents fall   Last known well at 12:01 AM today, woke up around 6 AM began having gait instability and difficulty walking resulted in fall now complaining of hip pain head pain and continues to feel off balance  She did not describe vertigo but she did tell me she had a funny sensation in her head she otherwise denies fevers chills or any other ill symptoms at this time  She does report having worsening bilateral leg swelling and redness  But denies fevers    PAST MEDICAL HISTORY    Past Medical History:   Diagnosis Date    Asthma     Cancer (HCC)     Breast cancer on left side     CKD (chronic kidney disease)     COVID     Depression     GERD (gastroesophageal reflux disease)     Neuropathy     Osteoporosis     Pernicious anemia     S/P thyroid biopsy     Scoliosis        SURGICAL HISTORY    Past Surgical History:   Procedure Laterality Date     SECTION       SECTION      x`s    KNEE SURGERY Left 2020    INCISION AND DRAINAGE LEFT KNEE performed by Daria Pineda MD at [facility] OR    UPPER GASTROINTESTINAL ENDOSCOPY N/A 2021    EGD BIOPSY performed by Elaina Grimm MD at [facility] ENDOSCOPY    US THYROID BIOPSY  2021    US THYROID BIOPSY 2021 [facility] ULTRASOUND       CURRENT MEDICATIONS          ALLERGIES    Allergies   Allergen Reactions    Latex Hives and Itching       Family history reviewed and noncontributory other than:  Family History   Problem Relation Age of Onset    Cancer Mother     Heart Disease Father        Social history reviewed and noncontributory other than:  Social History     Socioeconomic History    Marital status:

## 2024-10-22 NOTE — ED NOTES
Pt total bed change at this time. Brief changed. Alyce care provided. Pt daughter present. Call light within reach.  Bed in lowest position. No needs voiced at this time.

## 2024-10-22 NOTE — PROGRESS NOTES
Medication Reconciliation    List of medications patient is currently taking is complete.     Source of information: 1. Conversation with patient/daughter at bedside                                       2. EPIC records     Notes regarding home medications:   1. Patient did not receive any of her home medications prior to arrival to the ER today.  2. Patient's Gabapentin was recently increased to 300 mg po BID.  Patient and daughter feel this increase cause quite a bit of sedation and dizziness and they wish to go back to the 100 mg po BID dose at this time.  3. Patient recently completed a course of Cephalexin but did not take it appropriately (prescribed 1 capsule po QID - patient was taking just 2 capsules po QHS)    Serafin Hanks RPH, PharmD, BCPS  10/22/2024 11:07 AM

## 2024-10-22 NOTE — ED TRIAGE NOTES
Pt presents to the ED with c/c of fall this morning hitting posterior head. Pt endorses gait imbalance starting this at 0600, blurry vision x2 days. Denies any blood thinner, no LOC.

## 2024-10-22 NOTE — CONSULTS
Referring Physician: * No referring provider recorded for this case *  Reason for Consultation: ?Presyncope, troponin elevation  Chief Complaint: I felt weak and fell.      Subjective:   History of Present Illness:  Krystina Bajwa is a 89 y.o. patient with prior history of CKD, hypertension, neuropathy, asthma who presented to the hospital with complaints of bilateral leg weakness..  She wanted to go to the bathroom and she felt weak in her feet and she made multiple attempts to get up and was unable to do so.  She finally tried to get up but she lost her balance and fell.  She denied any dizziness or loss of consciousness.  She presented to the emergency room and she has been admitted for further evaluation.  She denies any chest tightness or pressure but her troponin is mildly elevated.    I have been asked to provide consultation regarding further management and testing.    Review of Systems:   All 12 point review of symptoms completed. Pertinent positives identified in the HPI, all other review of symptoms negative as below.    Past Medical History:   has a past medical history of Asthma, Cancer (HCC), CKD (chronic kidney disease), COVID, Depression, GERD (gastroesophageal reflux disease), Neuropathy, Osteoporosis, Pernicious anemia, S/P thyroid biopsy, and Scoliosis.    Surgical History:   has a past surgical history that includes  section;  section; knee surgery (Left, 2020); US BIOPSY THYROID (2021); and Upper gastrointestinal endoscopy (N/A, 2021).     Social History:   reports that she has quit smoking. Her smoking use included cigarettes. She has never used smokeless tobacco. She reports that she does not drink alcohol and does not use drugs.     Family History:  family history includes Cancer in her mother; Heart Disease in her father.      Home Medications:  Were reviewed and are listed in nursing record and/or below  Prior to Admission medications    Medication Sig  history, physical exam, review of data including labs, imaging, development and implementation of treatment plan and coordination of complex care. Counseled on risk factor modifications.      Thank you for allowing us to participate in the care of Krystina Bajwa.     Chris Mckenzie MD [facility] 10/22/2024 2:10 PM  [facility] [place]  10/22/2024 2:10 PM

## 2024-10-22 NOTE — CARE COORDINATION
Case Management Assessment  Initial Evaluation    Date/Time of Evaluation: 10/22/2024 10:42 AM  Assessment Completed by: MICHAEL Morel    If patient is discharged prior to next notation, then this note serves as note for discharge by case management.    Patient Name: Krystina Bajwa                   YOB: 1935  Diagnosis: No admission diagnoses are documented for this encounter.                   Date / Time: 10/22/2024  7:30 AM    Patient Admission Status: Emergency   Readmission Risk (Low < 19, Mod (19-27), High > 27): No data recorded  Current PCP: Jassi Pollock MD  PCP verified by CM? Yes    Chart Reviewed: Yes      History Provided by: Patient  Patient Orientation: Alert and Oriented    Patient Cognition: Alert    Hospitalization in the last 30 days (Readmission):  No    If yes, Readmission Assessment in  Navigator will be completed.    Advance Directives:      Code Status: Prior   Patient's Primary Decision Maker is: Legal Next of Kin    Primary Decision Maker: Magdy Bajwa Jr - Spouse - [phone]    Secondary Decision Maker: Debby Gilbert - Child - [phone]    Discharge Planning:    Patient lives with: Alone Type of Home: Skilled Nursing Facility  Primary Care Giver: Self  Patient Support Systems include: Family Members, Children   Current Financial resources: Medicare  Current community resources: ECF/Home Care  Current services prior to admission: Durable Medical Equipment            Current DME: Bedside Commode, Walker            Type of Home Care services:  None    ADLS  Prior functional level: Independent in ADLs/IADLs  Current functional level: Assistance with the following:, Housework, Shopping, Mobility    PT AM-PAC:   /24  OT AM-PAC:   /24    Family can provide assistance at DC: Yes  Would you like Case Management to discuss the discharge plan with any other family members/significant others, and if so, who? Yes (daughter)  Plans to Return to Present Housing:

## 2024-10-22 NOTE — H&P
V2.0  History and Physical      Name:  Krystina Bajwa /Age/Sex: 1935  (89 y.o. female)   MRN & CSN:  [id number] & [id number] Encounter Date/Time: 10/22/2024 12:10 PM EDT   [facility] PCP: Jassi Pollock MD       Hospital Day: 1    Assessment and Plan:   Krystina Bajwa is a 89 y.o. female with a pmh of asthma, CKD, GERD, neuropathy, scoliosis who presents with Pre-syncope and fall.  Patient reports feeling extremely week lately.  Today when she was trying to get out of the bed she had this problem she had to try a couple of times to be able to finally stand up.  And when she was walking she will felt unsteady, she felt that she is going to pass out and unfortunately she did fall to her back.  And did hit her head on the back.  Patient reports she her gabapentin dose increased recently  due to chronic pain problems.  She started increased dose last night.  Per daughter patient is having gait instability and difficulty walking so family is in the process of nursing home placement.  Patient denies chest pain but reported palpitation.  Denies shortness of breath.  Patient reports increasing pain, redness and swelling on bilateral lower legs. trauma workup in ER included CT head, CT cervical, thoracic and L-spine CTA head and neck, found to have L2 fracture.  Patient admitted for further evaluation and care    Hospital Problems             Last Modified POA    * (Principal) Pre-syncope 10/22/2024 Yes       Plan:  Presyncope, and fall: with mild elevation troponin, EKG no acute ischemic changes, reported palpitations, continue telemetry, consult cardiology  Worsening Unsteady gait, generalized weakness, and difficulty ambulation: PT, OT, social work consult for placement  Acute left L2 transverse process fracture, consult neurosurgery  Increasing pain, redness and swelling on bilateral lower legs, concern for cellulitis, start cefazolin  Thyroid nodule on imaging, heterogeneous thyroid gland,  PORTABLE    Result Date: 10/22/2024  EXAMINATION: ONE XRAY VIEW OF THE CHEST 10/22/2024 8:17 am COMPARISON: 09/23/2022 HISTORY: ORDERING SYSTEM PROVIDED HISTORY: Stroke eval TECHNOLOGIST PROVIDED HISTORY: Reason for exam:->Stroke eval Reason for Exam: Stroke eval FINDINGS: The lungs are without acute focal process.  There is no effusion or pneumothorax. The cardiomediastinal silhouette is stable. The osseous structures are stable.     No acute process.         Electronically signed by Vilma Anaya MD on 10/22/2024 at 12:10 PM

## 2024-10-22 NOTE — CARE COORDINATION
Discharge Planning:  [facility] will have skilled bed for patient on Friday morning- spoke with Brenda, Patient 's  is in LTC there.

## 2024-10-22 NOTE — PROGRESS NOTES
Physical Therapy  Attempt    Hold evaluation until clarification of activity/mobility restrictions (new L2 fx?).     Electronically signed by Jesus Lackey, PT [id number] on 10/22/2024 at 1:41 PM

## 2024-10-22 NOTE — CONSULTS
[place] neurosurgery note    Consulted for L2 TP fx.     No neurosurgical intervention or bracing is needed for transverse process fractures.  Pain control per primary.  NO neurosurgical follow up needed.     DEMARCO Ruiz MD

## 2024-10-22 NOTE — ED NOTES
Pt checked and changed at this time by Tammy CORREA RN. Reported this nurse that pt tolerated well. Pt clean and dry at this time.

## 2024-10-22 NOTE — ACP (ADVANCE CARE PLANNING)
Advance Care Planning   Healthcare Decision Maker:    Primary Decision Maker: Magdy Bajwa Jr - Spouse - [phone]    Secondary Decision Maker: Debby Gilbert - Child - [phone]      Today we documented Decision Maker(s) consistent with Legal Next of Kin hierarchy.       Electronically signed by MICHAEL Morel on 10/22/2024 at 10:37 AM

## 2024-10-22 NOTE — FLOWSHEET NOTE
10/22/24 1917   Vital Signs   BP (!) 174/84   MAP (Calculated) 114   MAP (mmHg) 110   BP Location Left upper arm   BP Method Automatic     Messaged provider regarding bp. Pt does not have any bp meds ordered and states she takes nothing at home. Hydralazine ordered.     Electronically signed by MAN GERARDO RN on 10/22/24 at 7:41 PM EDT

## 2024-10-22 NOTE — ED NOTES
Pt moved to RN bed at this time from CT. Pt resting well. Call light within reach. Bed in lowest position. No needs voiced at this time. Pt updated on plan of care. No s/sx of distress noted at this time. Labs collected at this time. Pt tolerated well.

## 2024-10-23 ENCOUNTER — APPOINTMENT (OUTPATIENT)
Age: 89
End: 2024-10-23
Attending: INTERNAL MEDICINE
Payer: MEDICARE

## 2024-10-23 LAB
ANION GAP SERPL CALCULATED.3IONS-SCNC: 6 MMOL/L (ref 3–16)
BASOPHILS # BLD: 0 K/UL (ref 0–0.2)
BASOPHILS NFR BLD: 0.8 %
BUN SERPL-MCNC: 25 MG/DL (ref 7–20)
CALCIUM SERPL-MCNC: 9.5 MG/DL (ref 8.3–10.6)
CHLORIDE SERPL-SCNC: 109 MMOL/L (ref 99–110)
CO2 SERPL-SCNC: 29 MMOL/L (ref 21–32)
CREAT SERPL-MCNC: 1 MG/DL (ref 0.6–1.2)
DEPRECATED RDW RBC AUTO: 14.2 % (ref 12.4–15.4)
ECHO AO ROOT DIAM: 3.3 CM
ECHO AO ROOT INDEX: 1.94 CM/M2
ECHO AV AREA PEAK VELOCITY: 1.6 CM2
ECHO AV AREA VTI: 1.6 CM2
ECHO AV AREA/BSA PEAK VELOCITY: 0.9 CM2/M2
ECHO AV AREA/BSA VTI: 0.9 CM2/M2
ECHO AV MEAN GRADIENT: 7 MMHG
ECHO AV MEAN VELOCITY: 1.3 M/S
ECHO AV PEAK GRADIENT: 13 MMHG
ECHO AV PEAK VELOCITY: 1.8 M/S
ECHO AV VELOCITY RATIO: 0.56
ECHO AV VTI: 39.7 CM
ECHO BSA: 1.72 M2
ECHO EST RA PRESSURE: 15 MMHG
ECHO IVC PROX: 2.1 CM
ECHO LA AREA 2C: 21.2 CM2
ECHO LA AREA 4C: 19.8 CM2
ECHO LA DIAMETER INDEX: 2.29 CM/M2
ECHO LA DIAMETER: 3.9 CM
ECHO LA MAJOR AXIS: 5.5 CM
ECHO LA MINOR AXIS: 4.9 CM
ECHO LA TO AORTIC ROOT RATIO: 1.18
ECHO LA VOL BP: 67 ML (ref 22–52)
ECHO LA VOL MOD A2C: 71 ML (ref 22–52)
ECHO LA VOL MOD A4C: 57 ML (ref 22–52)
ECHO LA VOL/BSA BIPLANE: 39 ML/M2 (ref 16–34)
ECHO LA VOLUME INDEX MOD A2C: 42 ML/M2 (ref 16–34)
ECHO LA VOLUME INDEX MOD A4C: 34 ML/M2 (ref 16–34)
ECHO LV E' LATERAL VELOCITY: 6 CM/S
ECHO LV E' SEPTAL VELOCITY: 6.2 CM/S
ECHO LV EF PHYSICIAN: 70 %
ECHO LV FRACTIONAL SHORTENING: 34 % (ref 28–44)
ECHO LV INTERNAL DIMENSION DIASTOLE INDEX: 2.41 CM/M2
ECHO LV INTERNAL DIMENSION DIASTOLIC: 4.1 CM (ref 3.9–5.3)
ECHO LV INTERNAL DIMENSION SYSTOLIC INDEX: 1.59 CM/M2
ECHO LV INTERNAL DIMENSION SYSTOLIC: 2.7 CM
ECHO LV IVSD: 1.4 CM (ref 0.6–0.9)
ECHO LV MASS 2D: 204.9 G (ref 67–162)
ECHO LV MASS INDEX 2D: 120.5 G/M2 (ref 43–95)
ECHO LV POSTERIOR WALL DIASTOLIC: 1.3 CM (ref 0.6–0.9)
ECHO LV RELATIVE WALL THICKNESS RATIO: 0.63
ECHO LVOT AREA: 2.8 CM2
ECHO LVOT AV VTI INDEX: 0.56
ECHO LVOT DIAM: 1.9 CM
ECHO LVOT MEAN GRADIENT: 2 MMHG
ECHO LVOT PEAK GRADIENT: 4 MMHG
ECHO LVOT PEAK VELOCITY: 1 M/S
ECHO LVOT STROKE VOLUME INDEX: 37 ML/M2
ECHO LVOT SV: 62.9 ML
ECHO LVOT VTI: 22.2 CM
ECHO MV A VELOCITY: 0.87 M/S
ECHO MV E VELOCITY: 0.7 M/S
ECHO MV E/A RATIO: 0.8
ECHO MV E/E' LATERAL: 11.67
ECHO MV E/E' RATIO (AVERAGED): 11.48
ECHO MV E/E' SEPTAL: 11.29
ECHO PV MAX VELOCITY: 1 M/S
ECHO PV PEAK GRADIENT: 4 MMHG
ECHO RIGHT VENTRICULAR SYSTOLIC PRESSURE (RVSP): 53 MMHG
ECHO RV FREE WALL PEAK S': 17 CM/S
ECHO RV TAPSE: 2.7 CM (ref 1.7–?)
ECHO TV REGURGITANT MAX VELOCITY: 3.08 M/S
ECHO TV REGURGITANT PEAK GRADIENT: 38 MMHG
EKG ATRIAL RATE: 79 BPM
EKG ATRIAL RATE: 81 BPM
EKG DIAGNOSIS: NORMAL
EKG DIAGNOSIS: NORMAL
EKG P AXIS: 44 DEGREES
EKG P AXIS: 69 DEGREES
EKG P-R INTERVAL: 104 MS
EKG P-R INTERVAL: 98 MS
EKG Q-T INTERVAL: 394 MS
EKG Q-T INTERVAL: 406 MS
EKG QRS DURATION: 130 MS
EKG QRS DURATION: 136 MS
EKG QTC CALCULATION (BAZETT): 457 MS
EKG QTC CALCULATION (BAZETT): 465 MS
EKG R AXIS: 59 DEGREES
EKG R AXIS: 63 DEGREES
EKG T AXIS: 19 DEGREES
EKG T AXIS: 39 DEGREES
EKG VENTRICULAR RATE: 79 BPM
EKG VENTRICULAR RATE: 81 BPM
EOSINOPHIL # BLD: 0.2 K/UL (ref 0–0.6)
EOSINOPHIL NFR BLD: 4 %
GFR SERPLBLD CREATININE-BSD FMLA CKD-EPI: 54 ML/MIN/{1.73_M2}
GLUCOSE BLD-MCNC: 156 MG/DL (ref 70–99)
GLUCOSE SERPL-MCNC: 89 MG/DL (ref 70–99)
HCT VFR BLD AUTO: 33.7 % (ref 36–48)
HGB BLD-MCNC: 11 G/DL (ref 12–16)
LYMPHOCYTES # BLD: 1.1 K/UL (ref 1–5.1)
LYMPHOCYTES NFR BLD: 26.1 %
MCH RBC QN AUTO: 31 PG (ref 26–34)
MCHC RBC AUTO-ENTMCNC: 32.6 G/DL (ref 31–36)
MCV RBC AUTO: 95.1 FL (ref 80–100)
MONOCYTES # BLD: 0.8 K/UL (ref 0–1.3)
MONOCYTES NFR BLD: 18.6 %
NEUTROPHILS # BLD: 2.1 K/UL (ref 1.7–7.7)
NEUTROPHILS NFR BLD: 50.5 %
PERFORMED ON: ABNORMAL
PLATELET # BLD AUTO: 153 K/UL (ref 135–450)
PMV BLD AUTO: 9.3 FL (ref 5–10.5)
POTASSIUM SERPL-SCNC: 4.5 MMOL/L (ref 3.5–5.1)
RBC # BLD AUTO: 3.54 M/UL (ref 4–5.2)
SODIUM SERPL-SCNC: 144 MMOL/L (ref 136–145)
TROPONIN, HIGH SENSITIVITY: 44 NG/L (ref 0–14)
WBC # BLD AUTO: 4.2 K/UL (ref 4–11)

## 2024-10-23 PROCEDURE — 93005 ELECTROCARDIOGRAM TRACING: CPT | Performed by: INTERNAL MEDICINE

## 2024-10-23 PROCEDURE — 94760 N-INVAS EAR/PLS OXIMETRY 1: CPT

## 2024-10-23 PROCEDURE — 2580000003 HC RX 258: Performed by: INTERNAL MEDICINE

## 2024-10-23 PROCEDURE — 6370000000 HC RX 637 (ALT 250 FOR IP): Performed by: INTERNAL MEDICINE

## 2024-10-23 PROCEDURE — 6360000002 HC RX W HCPCS: Performed by: INTERNAL MEDICINE

## 2024-10-23 PROCEDURE — 97116 GAIT TRAINING THERAPY: CPT | Performed by: PHYSICAL THERAPIST

## 2024-10-23 PROCEDURE — 1200000000 HC SEMI PRIVATE

## 2024-10-23 PROCEDURE — 36415 COLL VENOUS BLD VENIPUNCTURE: CPT

## 2024-10-23 PROCEDURE — 97162 PT EVAL MOD COMPLEX 30 MIN: CPT | Performed by: PHYSICAL THERAPIST

## 2024-10-23 PROCEDURE — 6370000000 HC RX 637 (ALT 250 FOR IP): Performed by: NURSE PRACTITIONER

## 2024-10-23 PROCEDURE — 93010 ELECTROCARDIOGRAM REPORT: CPT | Performed by: STUDENT IN AN ORGANIZED HEALTH CARE EDUCATION/TRAINING PROGRAM

## 2024-10-23 PROCEDURE — 97530 THERAPEUTIC ACTIVITIES: CPT

## 2024-10-23 PROCEDURE — 94640 AIRWAY INHALATION TREATMENT: CPT

## 2024-10-23 PROCEDURE — 99232 SBSQ HOSP IP/OBS MODERATE 35: CPT | Performed by: NURSE PRACTITIONER

## 2024-10-23 PROCEDURE — 80048 BASIC METABOLIC PNL TOTAL CA: CPT

## 2024-10-23 PROCEDURE — 97166 OT EVAL MOD COMPLEX 45 MIN: CPT

## 2024-10-23 PROCEDURE — 93306 TTE W/DOPPLER COMPLETE: CPT

## 2024-10-23 PROCEDURE — 93010 ELECTROCARDIOGRAM REPORT: CPT | Performed by: INTERNAL MEDICINE

## 2024-10-23 PROCEDURE — 97530 THERAPEUTIC ACTIVITIES: CPT | Performed by: PHYSICAL THERAPIST

## 2024-10-23 PROCEDURE — 84484 ASSAY OF TROPONIN QUANT: CPT

## 2024-10-23 PROCEDURE — 93306 TTE W/DOPPLER COMPLETE: CPT | Performed by: INTERNAL MEDICINE

## 2024-10-23 PROCEDURE — 85025 COMPLETE CBC W/AUTO DIFF WBC: CPT

## 2024-10-23 PROCEDURE — 97535 SELF CARE MNGMENT TRAINING: CPT

## 2024-10-23 RX ORDER — MELOXICAM 7.5 MG/1
15 TABLET ORAL DAILY
Status: DISCONTINUED | OUTPATIENT
Start: 2024-10-23 | End: 2024-10-25 | Stop reason: HOSPADM

## 2024-10-23 RX ORDER — FUROSEMIDE 20 MG/1
20 TABLET ORAL DAILY
Status: DISCONTINUED | OUTPATIENT
Start: 2024-10-23 | End: 2024-10-25 | Stop reason: HOSPADM

## 2024-10-23 RX ORDER — GABAPENTIN 100 MG/1
100 CAPSULE ORAL 2 TIMES DAILY
Status: DISCONTINUED | OUTPATIENT
Start: 2024-10-23 | End: 2024-10-25 | Stop reason: HOSPADM

## 2024-10-23 RX ORDER — TROSPIUM CHLORIDE 20 MG/1
20 TABLET, FILM COATED ORAL NIGHTLY
Status: DISCONTINUED | OUTPATIENT
Start: 2024-10-23 | End: 2024-10-25 | Stop reason: HOSPADM

## 2024-10-23 RX ORDER — OXYCODONE HYDROCHLORIDE 5 MG/1
5 TABLET ORAL EVERY 6 HOURS PRN
Status: DISCONTINUED | OUTPATIENT
Start: 2024-10-23 | End: 2024-10-25 | Stop reason: HOSPADM

## 2024-10-23 RX ORDER — CLONAZEPAM 0.5 MG/1
0.5 TABLET ORAL 2 TIMES DAILY PRN
Status: DISCONTINUED | OUTPATIENT
Start: 2024-10-23 | End: 2024-10-25 | Stop reason: HOSPADM

## 2024-10-23 RX ORDER — FAMOTIDINE 20 MG/1
20 TABLET, FILM COATED ORAL 2 TIMES DAILY
Status: DISCONTINUED | OUTPATIENT
Start: 2024-10-23 | End: 2024-10-23

## 2024-10-23 RX ADMIN — ACETAMINOPHEN 650 MG: 325 TABLET ORAL at 10:56

## 2024-10-23 RX ADMIN — BUDESONIDE AND FORMOTEROL FUMARATE DIHYDRATE 2 PUFF: 80; 4.5 AEROSOL RESPIRATORY (INHALATION) at 19:53

## 2024-10-23 RX ADMIN — CEFAZOLIN 2000 MG: 2 INJECTION, POWDER, FOR SOLUTION INTRAVENOUS at 20:52

## 2024-10-23 RX ADMIN — CLONAZEPAM 0.5 MG: 0.5 TABLET ORAL at 10:23

## 2024-10-23 RX ADMIN — OXYCODONE 5 MG: 5 TABLET ORAL at 10:23

## 2024-10-23 RX ADMIN — CEFAZOLIN 2000 MG: 2 INJECTION, POWDER, FOR SOLUTION INTRAVENOUS at 05:16

## 2024-10-23 RX ADMIN — FUROSEMIDE 20 MG: 20 TABLET ORAL at 15:56

## 2024-10-23 RX ADMIN — SODIUM CHLORIDE, PRESERVATIVE FREE 10 ML: 5 INJECTION INTRAVENOUS at 20:54

## 2024-10-23 RX ADMIN — GABAPENTIN 100 MG: 100 CAPSULE ORAL at 10:56

## 2024-10-23 RX ADMIN — SODIUM CHLORIDE, PRESERVATIVE FREE 10 ML: 5 INJECTION INTRAVENOUS at 09:30

## 2024-10-23 RX ADMIN — TROSPIUM CHLORIDE 20 MG: 20 TABLET, FILM COATED ORAL at 20:52

## 2024-10-23 RX ADMIN — ENOXAPARIN SODIUM 40 MG: 100 INJECTION SUBCUTANEOUS at 20:52

## 2024-10-23 RX ADMIN — PANTOPRAZOLE SODIUM 40 MG: 40 TABLET, DELAYED RELEASE ORAL at 05:16

## 2024-10-23 RX ADMIN — BUDESONIDE AND FORMOTEROL FUMARATE DIHYDRATE 2 PUFF: 80; 4.5 AEROSOL RESPIRATORY (INHALATION) at 09:10

## 2024-10-23 RX ADMIN — CEFAZOLIN 2000 MG: 2 INJECTION, POWDER, FOR SOLUTION INTRAVENOUS at 15:56

## 2024-10-23 RX ADMIN — GABAPENTIN 100 MG: 100 CAPSULE ORAL at 20:52

## 2024-10-23 RX ADMIN — MELOXICAM 15 MG: 7.5 TABLET ORAL at 10:57

## 2024-10-23 ASSESSMENT — PAIN SCALES - GENERAL
PAINLEVEL_OUTOF10: 4
PAINLEVEL_OUTOF10: 5

## 2024-10-23 ASSESSMENT — PAIN DESCRIPTION - ORIENTATION: ORIENTATION: RIGHT;LEFT

## 2024-10-23 ASSESSMENT — PAIN DESCRIPTION - DESCRIPTORS: DESCRIPTORS: STABBING

## 2024-10-23 ASSESSMENT — PAIN DESCRIPTION - LOCATION: LOCATION: CHEST;ABDOMEN

## 2024-10-23 NOTE — PROGRESS NOTES
4 Eyes Skin Assessment     NAME:  Krystina Bajwa  YOB: 1935  MEDICAL RECORD NUMBER:  [id number]    The patient is being assessed for  Admission    I agree that at least one RN has performed a thorough Head to Toe Skin Assessment on the patient. ALL assessment sites listed below have been assessed.      Areas assessed by both nurses:    Head, Face, Ears, Shoulders, Back, Chest, Arms, Elbows, Hands, Sacrum. Buttock, Coccyx, Ischium, Legs. Feet and Heels, and Under Medical Devices         Does the Patient have a Wound? No noted wound(s)       Benson Prevention initiated by RN: Yes  Wound Care Orders initiated by RN: no    Pressure Injury (Stage 3,4, Unstageable, DTI, NWPT, and Complex wounds) if present, place Wound referral order by RN under : No    New Ostomies, if present place, Ostomy referral order under : No     Nurse 1 eSignature: Electronically signed by MAN GERARDO RN on 10/22/24 at 10:39 PM EDT    **SHARE this note so that the co-signing nurse can place an eSignature**    Nurse 2 eSignature: Electronically signed by Kimberly Lockhart RN on 10/22/24 at 10:40 PM EDT

## 2024-10-23 NOTE — PROGRESS NOTES
Physical Therapy  Facility/Department: [facility] 5 PROGRESSIVE CARE  Physical Therapy Initial Assessment    Name: Krystina Bajwa  : 1935  MRN: [id number]  Date of Service: 10/23/2024    Discharge Recommendations:  Subacute/Skilled Nursing Facility   PT Equipment Recommendations  Equipment Needed: No      Krystina Bajwa scored a 16/24 on the AM-PAC short mobility form. Current research shows that an AM-PAC score of 17 or less is typically not associated with a discharge to the patient's home setting. Based on the patient's AM-PAC score and their current functional mobility deficits, it is recommended that the patient have 3-5 sessions per week of Physical Therapy at d/c to increase the patient's independence.  Please see assessment section for further patient specific details.    If patient discharges prior to next session this note will serve as a discharge summary.  Please see below for the latest assessment towards goals.      Patient Diagnosis(es): The primary encounter diagnosis was Fall, initial encounter. Diagnoses of Dizziness, Lumbar transverse process fracture, closed, initial encounter (HCC), Pre-syncope, and Syncope, unspecified syncope type were also pertinent to this visit.  Past Medical History:  has a past medical history of Asthma, Cancer (HCC), CKD (chronic kidney disease), COVID, Depression, GERD (gastroesophageal reflux disease), Neuropathy, Osteoporosis, Pernicious anemia, S/P thyroid biopsy, and Scoliosis.  Past Surgical History:  has a past surgical history that includes  section;  section; knee surgery (Left, 2020); US BIOPSY THYROID (2021); and Upper gastrointestinal endoscopy (N/A, 2021).    Assessment  Body Structures, Functions, Activity Limitations Requiring Skilled Therapeutic Intervention: Decreased functional mobility   Assessment: pt is an 89 yo female who was ad to hosp with prior history of CKD, hypertension, neuropathy, asthma who presented to  [facility] fx.  Response To Previous Treatment: Not applicable  Family / Caregiver Present: No  Referring Practitioner: Dr Anaya  Referral Date : 10/22/24  Follows Commands: Within Functional Limits  Subjective  Subjective: pt has some anxiety; agreeable to working with therapy         Social/Functional History  Social/Functional History  Lives With: Alone (spouse in LTC)  Type of Home: House  Home Layout: Laundry in basement, Two level, Bed/Bath upstairs (2 story + basement)  Home Access: Stairs to enter with rails  Entrance Stairs - Number of Steps: 5 MATTHEW; no bathroom main floor; bed/bath upstairs; 7 steps upstairs and 7 steps down to laundry  Bathroom Shower/Tub: Walk-in shower  Home Equipment: Walker - 4-Wheeled, Walker - Rolling  Has the patient had two or more falls in the past year or any fall with injury in the past year?:  (one fall PTA)  ADL Assistance: Independent  Homemaking Assistance:  (family help with household task; light cooking with microwave meals)  Ambulation Assistance: Independent (with 4WW)  Transfer Assistance: Independent  Active : No (family drive)  Additional Comments: Pt reports spouse is in LTC at [facility] and pt hopeful to discharge to [facility] at discharge.  Vision/Hearing  Vision  Vision Exceptions: Wears glasses at all times  Hearing  Hearing: Exceptions to WFL  Hearing Exceptions: Hard of hearing/hearing concerns;No hearing aid    Cognition   Orientation  Overall Orientation Status: Within Functional Limits  Cognition  Overall Cognitive Status: Exceptions  Arousal/Alertness: Appears intact  Following Commands: Appears intact  Attention Span: Attends with cues to redirect  Memory: Appears intact  Safety Judgement: Impaired  Problem Solving: Impaired  Insights: Impaired  Initiation: Requires cues for some  Cognition Comment: anxiety    Objective    AROM RLE (degrees)  RLE AROM: WFL  AROM LLE (degrees)  LLE AROM : WFL  Strength RLE  Strength RLE: WFL  Strength LLE  Strength LLE: WFL

## 2024-10-23 NOTE — PROGRESS NOTES
V2.0    [facility] [place]      Name:  Krystina Bajwa /Age/Sex: 1935  (89 y.o. female)   MRN & CSN:  [id number] & [id number] Encounter Date/Time: 10/23/2024 3:39 PM EDT   Location:  [id number]/5127-01 PCP: Jassi Pollock MD     Attending:Vilma Anaya MD       Hospital Day: 2    Assessment and Recommendations   Krystina Bajwa is a 89 y.o. female from NH with a pmh of asthma, CKD, GERD, neuropathy, scoliosis who presents with Pre-syncope and fall. Found to have bilateral lower ext cellulitis.       Plan:   Presyncope, and fall: Due to BLE weakness and imbalance.   -Mild elevation troponin, EKG no acute ischemic changes, denies chest pain. Non cardiac.   Worsening Unsteady gait, generalized weakness, and difficulty ambulation: PT, OT, social work consult for placement, DC plan to [facility], bed available Friday.   Acute left L2 transverse process fracture, consulted neurosurgery, non surgical, no need brace, cont pain management   Increasing pain, redness and swelling on bilateral lower legs, concern for cellulitis, cont cefazolin  Thyroid nodule on imaging, heterogeneous thyroid gland, outpatient follow-up with endocrinology for this matter  GERD, continue PPI  Chronic pain syndrome, neuropathy, resumed gabapentin   Asthma with no exacerbation, continue inhalers         Diet ADULT DIET; Regular   DVT Prophylaxis [x] Lovenox, []  Heparin, [] SCDs, [] Ambulation,  [] Eliquis, [] Xarelto  [] Coumadin   Code Status Full Code             Personally reviewed Lab Studies and Imaging     Discussed management of the case with , discharge plan to [facility]     Medical Decision Making:  The following items were considered in medical decision making:  Discussion of patient care with other providers  Reviewed clinical lab tests  Reviewed radiology tests  Reviewed other diagnostic tests/interventions  Independent review of radiologic images  Microbiology cultures and other micro tests reviewed   PRN  ondansetron, 4 mg, Q8H PRN   Or  ondansetron, 4 mg, Q6H PRN  polyethylene glycol, 17 g, Daily PRN  acetaminophen, 650 mg, Q6H PRN   Or  acetaminophen, 650 mg, Q6H PRN  albuterol sulfate HFA, 2 puff, Q6H PRN        Labs and Imaging   Echo (TTE) complete (PRN contrast/bubble/strain/3D)    Result Date: 10/23/2024    Left Ventricle: Normal left ventricular systolic function. EF by visual approximation is 70%. Left ventricle size is normal. Findings consistent with moderate concentric hypertrophy. Normal wall motion. Abnormal diastolic function.   Right Ventricle: Right ventricle size is normal. Normal systolic function   Aortic Valve: Mild to moderate regurgitation.   Mitral Valve: Mild regurgitation.   Tricuspid Valve: Mild regurgitation. The estimated RVSP is 53 mmHg. Moderately elevated RVSP, consistent with moderate pulmonary hypertension.   Left Atrium: Left atrium is mildly dilated. Left atrial volume index is mildly increased (35-41 mL/m2). LA Vol Index is  39 ml/m2.   Image quality is adequate.     CT LUMBAR SPINE BONY RECONSTRUCTION    Result Date: 10/22/2024  EXAMINATION: CT OF THE CHEST, ABDOMEN, AND PELVIS WITHOUT CONTRAST; CT OF THE THORACIC SPINE WITHOUT CONTRAST; CT OF THE LUMBAR SPINE WITHOUT CONTRAST 10/22/2024 8:04 am TECHNIQUE: CT of the chest, abdomen and pelvis was performed without the administration of intravenous contrast. Multiplanar reformatted images are provided for review. Automated exposure control, iterative reconstruction, and/or weight based adjustment of the mA/kV was utilized to reduce the radiation dose to as low as reasonably achievable.; CT of the thoracic spine was performed without the administration of intravenous contrast. Multiplanar reformatted images are provided for review. Automated exposure control, iterative reconstruction, and/or weight based adjustment of the mA/kV was utilized to reduce the radiation dose to as low as reasonably achievable.; CT of the lumbar spine

## 2024-10-23 NOTE — PROGRESS NOTES
[facility]   Daily Progress Note      Admit Date:  10/22/2024    CC: weakness    HPI:   Krystina Bajwa is a 89 y.o. female with PMH CKD, HTN, neuropathy, childhood murmur - resolved    Presented to [facility] ED with BLE weakness and imbalance  + chronic back pain, recently increased neurontin dose a few days prior to onset of symptoms.    Sitting up in chair today  Continues w back pain and generalized weakness and fatigue.   Denies CP or SOB. On RA.    Review of Systems:   General: Denies fever, chills  RESP: Denies cough, sputum, dyspnea, wheeze, snoring  CARD: Denies palpitations,  murmur  GI:Denies nausea, vomiting, heartburn, loss of appetite, change in bowels  VASC: Denies claudication, leg cramps  NEURO: Denies numbness, tingling, weakness,change in mood or memory  HEME: Denies abn bruising, bleeding, anemia    Objective:   BP (!) 119/55   Pulse 82   Temp 97.8 °F (36.6 °C) (Oral)   Resp 18   Ht 1.626 m (5' 4\")   Wt 65.3 kg (144 lb)   SpO2 92%   BMI 24.72 kg/m²         Intake/Output Summary (Last 24 hours) at 10/23/2024 1338  Last data filed at 10/22/2024 2246  Gross per 24 hour   Intake 240 ml   Output 1650 ml   Net -1410 ml     I/O since adm:     WEIGHT:Admit Weight - Scale: 72.8 kg (160 lb 7.9 oz)         Today  Weight - Scale: 65.3 kg (144 lb)   DRY WEIGHT:  Wt Readings from Last 3 Encounters:   10/23/24 65.3 kg (144 lb)   09/25/23 59 kg (130 lb)   11/08/22 59.5 kg (131 lb 2.8 oz)       Physical Exam:  GEN: Appears frail,  no acute distress  SKIN: Pink, warm, dry.   LUNG: AP diameter normal. Clear bilateral. No wheeze, rales, or ronchi. Respiratory effort normal.  HEART: S1S2 A/R. No JVD. No carotid bruit. No murmur, rub or gallop.  ABD: Soft, nontender. +BS X 4 quads. No hepatomegaly.   EXT: Radial and pedal pulses 2+ and symmetric. Without varicosities. No edema.  MUSCSKEL: Good ROM X4 extremities. No deformity.   NEURO: A/O X3. Calm and cooperative.     Telemetry: NSR    Medications:     gabapentin  100 mg Oral BID    meloxicam  15 mg Oral Daily    trospium  20 mg Oral Nightly    sodium chloride flush  5-40 mL IntraVENous 2 times per day    enoxaparin  40 mg SubCUTAneous Nightly    acetaminophen  650 mg Oral Lunch    budesonide-formoterol  2 puff Inhalation BID RT    pantoprazole  40 mg Oral QAM AC    ceFAZolin  2,000 mg IntraVENous Q8H      sodium chloride       oxyCODONE, clonazePAM, sodium chloride flush, sodium chloride, potassium chloride **OR** potassium alternative oral replacement **OR** potassium chloride, magnesium sulfate, ondansetron **OR** ondansetron, polyethylene glycol, acetaminophen **OR** acetaminophen, albuterol sulfate HFA    Lab Data: I have reviewed all labs below today.   CBC:   Recent Labs     10/22/24  0754 10/23/24  0501   WBC 5.1 4.2   HGB 12.2 11.0*   HCT 37.3 33.7*   MCV 95.9 95.1    153     BMP:   Recent Labs     10/22/24  0754 10/23/24  0501    144   K 4.8  4.8 4.5    109   CO2 28 29   BUN 23* 25*   CREATININE 1.0 1.0     GLUCOSE:   Recent Labs     10/22/24  0754 10/23/24  0501   GLUCOSE 79 89     LIVER PROFILE:   Lab Results   Component Value Date/Time    AST 26 10/22/2024 07:54 AM    ALT 13 10/22/2024 07:54 AM    LIPASE 11.0 02/10/2018 01:45 PM    BILIDIR <0.2 11/03/2021 03:40 AM    BILITOT 0.4 10/22/2024 07:54 AM    ALKPHOS 74 10/22/2024 07:54 AM     PT/INR:   Lab Results   Component Value Date/Time    PROTIME 13.3 10/22/2024 07:54 AM    INR 0.99 10/22/2024 07:54 AM    INR 0.94 07/31/2020 06:29 AM     APTT:   Lab Results   Component Value Date/Time    APTT 31.5 07/31/2020 06:29 AM     Pro-BNP:    Lab Results   Component Value Date/Time    PROBNP 432 10/22/2024 07:54 AM    PROBNP 157 09/23/2022 11:43 PM     Parameters:   > 450 pg/mL under age 50  > 900 pg/mL ages 50-75  > 1800 pg/mL over age 75      FASTING LIPID PANEL:No results found for: \"CHOL\", \"HDL\", \"TRIG\"  Cardiac Enzymes:   Troponin, High Sensitivity (ng/L)   Date Value   10/22/2024 45

## 2024-10-23 NOTE — PROGRESS NOTES
Physician Progress Note      PATIENT:               ALONDRA TORRES  [facility] #:                  [id number]  :                       1935  ADMIT DATE:       10/22/2024 7:30 AM  DISCH DATE:  RESPONDING  PROVIDER #:        Vilma Anaya MD          QUERY TEXT:    Pt admitted with weakness and noted decreased functional mobility by PT and   OT.  If possible, please document in the progress notes and discharge summary   if you are evaluating and / or treating any of the following:  The medical record reflects the following:  Risk Factors: 89 year old female presenting with weakness after fall  Clinical Indicators: 10/22 H&P- Patient reports feeling extremely week lately.    Today when she was trying to get out of the bed she had this problem she had   to try a couple of times to be able to finally stand up... Worsening Unsteady   gait, generalized weakness, and difficulty ambulation: PT, OT, social work   consult for placement. 10/23 OT progress note- Assessment- Performance   deficits / Impairments: Decreased functional mobility; Decreased safe   awareness; Decreased balance; Decreased ADL status; Decreased endurance;   Decreased high-level IADLs; Decreased strength. 10/23 PT   Treatment: Labs, imaging, PT/OT  Options provided:  -- Age Related Physical Debility  -- Other - I will add my own diagnosis  -- Disagree - Not applicable / Not valid  -- Disagree - Clinically unable to determine / Unknown  -- Refer to Clinical Documentation Reviewer    PROVIDER RESPONSE TEXT:    This patient has age related physical debility.    Query created by: Rossy Hernandez on 10/23/2024 4:34 PM      Electronically signed by:  Vilma Anaya MD 10/23/2024 5:56 PM

## 2024-10-23 NOTE — CARE COORDINATION
DISCHARGE PLANNING:    DC plan to [facility].  No precert required.  Bed will be available Friday 10/25.  Will need HENS and transport.    #[phone]  Electronically signed by Blossom Fisher RN on 10/23/2024 at 11:50 AM

## 2024-10-23 NOTE — PROGRESS NOTES
Occupational Therapy  Facility/Department: [facility] 5 PROGRESSIVE CARE  Occupational Therapy Initial Assessment    Name: Krystina Bajwa  : 1935  MRN: [id number]  Date of Service: 10/23/2024    Discharge Recommendations:  Patient would benefit from continued therapy after discharge, 3-5 sessions per week  OT Equipment Recommendations  Other: defer to [place] facility     Krystina Bajwa scored a 15/24 on the AM-PAC ADL Inpatient form. Current research shows that an AM-PAC score of 17 or less is typically not associated with a discharge to the patient's home setting. Based on the patient's AM-PAC score and their current ADL deficits, it is recommended that the patient have 3-5 sessions per week of Occupational Therapy at d/c to increase the patient's independence.  Please see assessment section for further patient specific details.    If patient discharges prior to next session this note will serve as a discharge summary.  Please see below for the latest assessment towards goals.        Patient Diagnosis(es): The primary encounter diagnosis was Fall, initial encounter. Diagnoses of Dizziness, Lumbar transverse process fracture, closed, initial encounter (HCC), Pre-syncope, and Syncope, unspecified syncope type were also pertinent to this visit.  Past Medical History:  has a past medical history of Asthma, Cancer (HCC), CKD (chronic kidney disease), COVID, Depression, GERD (gastroesophageal reflux disease), Neuropathy, Osteoporosis, Pernicious anemia, S/P thyroid biopsy, and Scoliosis.  Past Surgical History:  has a past surgical history that includes  section;  section; knee surgery (Left, 2020); US BIOPSY THYROID (2021); and Upper gastrointestinal endoscopy (N/A, 2021).    Assessment  Performance deficits / Impairments: Decreased functional mobility ;Decreased safe awareness;Decreased balance;Decreased ADL status;Decreased endurance;Decreased high-level IADLs;Decreased

## 2024-10-24 LAB
GLUCOSE BLD-MCNC: 105 MG/DL (ref 70–99)
PERFORMED ON: ABNORMAL

## 2024-10-24 PROCEDURE — 6360000002 HC RX W HCPCS: Performed by: INTERNAL MEDICINE

## 2024-10-24 PROCEDURE — 6370000000 HC RX 637 (ALT 250 FOR IP): Performed by: INTERNAL MEDICINE

## 2024-10-24 PROCEDURE — 6370000000 HC RX 637 (ALT 250 FOR IP): Performed by: NURSE PRACTITIONER

## 2024-10-24 PROCEDURE — 2580000003 HC RX 258: Performed by: INTERNAL MEDICINE

## 2024-10-24 PROCEDURE — 94640 AIRWAY INHALATION TREATMENT: CPT

## 2024-10-24 PROCEDURE — 94760 N-INVAS EAR/PLS OXIMETRY 1: CPT

## 2024-10-24 PROCEDURE — 97530 THERAPEUTIC ACTIVITIES: CPT

## 2024-10-24 PROCEDURE — 6370000000 HC RX 637 (ALT 250 FOR IP): Performed by: STUDENT IN AN ORGANIZED HEALTH CARE EDUCATION/TRAINING PROGRAM

## 2024-10-24 PROCEDURE — 97116 GAIT TRAINING THERAPY: CPT

## 2024-10-24 PROCEDURE — 97110 THERAPEUTIC EXERCISES: CPT

## 2024-10-24 PROCEDURE — 1200000000 HC SEMI PRIVATE

## 2024-10-24 RX ORDER — DOCUSATE SODIUM 100 MG/1
100 CAPSULE, LIQUID FILLED ORAL DAILY
Status: DISCONTINUED | OUTPATIENT
Start: 2024-10-24 | End: 2024-10-25 | Stop reason: HOSPADM

## 2024-10-24 RX ORDER — SENNOSIDES A AND B 8.6 MG/1
1 TABLET, FILM COATED ORAL NIGHTLY
Status: DISCONTINUED | OUTPATIENT
Start: 2024-10-24 | End: 2024-10-25 | Stop reason: HOSPADM

## 2024-10-24 RX ADMIN — BUDESONIDE AND FORMOTEROL FUMARATE DIHYDRATE 2 PUFF: 80; 4.5 AEROSOL RESPIRATORY (INHALATION) at 20:55

## 2024-10-24 RX ADMIN — ALBUTEROL SULFATE 2 PUFF: 90 AEROSOL, METERED RESPIRATORY (INHALATION) at 08:12

## 2024-10-24 RX ADMIN — GABAPENTIN 100 MG: 100 CAPSULE ORAL at 08:03

## 2024-10-24 RX ADMIN — PANTOPRAZOLE SODIUM 40 MG: 40 TABLET, DELAYED RELEASE ORAL at 06:06

## 2024-10-24 RX ADMIN — Medication 1 LOZENGE: at 21:08

## 2024-10-24 RX ADMIN — CEFAZOLIN 2000 MG: 2 INJECTION, POWDER, FOR SOLUTION INTRAVENOUS at 13:03

## 2024-10-24 RX ADMIN — FUROSEMIDE 20 MG: 20 TABLET ORAL at 08:03

## 2024-10-24 RX ADMIN — CLONAZEPAM 0.5 MG: 0.5 TABLET ORAL at 11:12

## 2024-10-24 RX ADMIN — GABAPENTIN 100 MG: 100 CAPSULE ORAL at 20:09

## 2024-10-24 RX ADMIN — ONDANSETRON 4 MG: 4 TABLET, ORALLY DISINTEGRATING ORAL at 08:07

## 2024-10-24 RX ADMIN — CEFAZOLIN 2000 MG: 2 INJECTION, POWDER, FOR SOLUTION INTRAVENOUS at 21:08

## 2024-10-24 RX ADMIN — CEFAZOLIN 2000 MG: 2 INJECTION, POWDER, FOR SOLUTION INTRAVENOUS at 06:06

## 2024-10-24 RX ADMIN — TROSPIUM CHLORIDE 20 MG: 20 TABLET, FILM COATED ORAL at 20:09

## 2024-10-24 RX ADMIN — BUDESONIDE AND FORMOTEROL FUMARATE DIHYDRATE 2 PUFF: 80; 4.5 AEROSOL RESPIRATORY (INHALATION) at 08:12

## 2024-10-24 RX ADMIN — DOCUSATE SODIUM 100 MG: 100 CAPSULE, LIQUID FILLED ORAL at 17:44

## 2024-10-24 RX ADMIN — SENNOSIDES 8.6 MG: 8.6 TABLET, FILM COATED ORAL at 20:09

## 2024-10-24 RX ADMIN — SODIUM CHLORIDE, PRESERVATIVE FREE 10 ML: 5 INJECTION INTRAVENOUS at 20:10

## 2024-10-24 RX ADMIN — CLONAZEPAM 0.5 MG: 0.5 TABLET ORAL at 20:09

## 2024-10-24 RX ADMIN — MELOXICAM 15 MG: 7.5 TABLET ORAL at 08:02

## 2024-10-24 RX ADMIN — POLYETHYLENE GLYCOL 3350 17 G: 17 POWDER, FOR SOLUTION ORAL at 08:06

## 2024-10-24 RX ADMIN — ENOXAPARIN SODIUM 40 MG: 100 INJECTION SUBCUTANEOUS at 20:09

## 2024-10-24 RX ADMIN — SODIUM CHLORIDE, PRESERVATIVE FREE 10 ML: 5 INJECTION INTRAVENOUS at 08:03

## 2024-10-24 RX ADMIN — ACETAMINOPHEN 650 MG: 325 TABLET ORAL at 11:08

## 2024-10-24 ASSESSMENT — PAIN DESCRIPTION - LOCATION
LOCATION: BACK
LOCATION: BACK;BUTTOCKS
LOCATION: THROAT

## 2024-10-24 ASSESSMENT — PAIN DESCRIPTION - ORIENTATION: ORIENTATION: LEFT;RIGHT

## 2024-10-24 ASSESSMENT — PAIN SCALES - GENERAL
PAINLEVEL_OUTOF10: 2
PAINLEVEL_OUTOF10: 6
PAINLEVEL_OUTOF10: 0
PAINLEVEL_OUTOF10: 4
PAINLEVEL_OUTOF10: 4
PAINLEVEL_OUTOF10: 2
PAINLEVEL_OUTOF10: 0

## 2024-10-24 ASSESSMENT — PAIN DESCRIPTION - DESCRIPTORS
DESCRIPTORS: ACHING
DESCRIPTORS: ACHING;DISCOMFORT
DESCRIPTORS: SORE

## 2024-10-24 ASSESSMENT — PAIN DESCRIPTION - PAIN TYPE: TYPE: ACUTE PAIN

## 2024-10-24 ASSESSMENT — PAIN DESCRIPTION - ONSET: ONSET: ON-GOING

## 2024-10-24 ASSESSMENT — PAIN DESCRIPTION - FREQUENCY: FREQUENCY: CONTINUOUS

## 2024-10-24 NOTE — PROGRESS NOTES
V2.0    [facility] Progress Note      Name:  Krystina Bajwa /Age/Sex: 1935  (89 y.o. female)   MRN & CSN:  [id number] & [id number] Encounter Date/Time: 10/24/2024 2:15 PM EDT   Location:  [id number]/5127-01 PCP: Jassi Pollock MD     Attending:Wilber Pineda DO       Hospital Day: 3    Assessment and Recommendations   Krystina Bajwa is a 89 y.o. female with pertinent PMHx of CKD, asthma, GERD, neuropathy, scoliosis who presented with presyncope and fall.    Presyncope  Fall  -Secondary to bilateral lower extremity weakness and imbalance  -Possible gabapentin contributing as this was recently increased  -Cardiology was consulted do not believe this to be cardiac origin    Lower extremity cellulitis  -Cefazolin    L2 transverse process compression fracture  -Neurosurgery was consulted, recommended no neurosurgical intervention or bracing    Generalized weakness  -PT and OT following, recommending SNF with continued therapy      Chronic diastolic heart failure without acute exacerbation  -Lasix 20 mg daily    Asthma without exacerbation  -Continue Symbicort    Thyroid nodule  -Incidental finding, measuring up to 18 mm  -Recommend outpatient follow-up thyroid ultrasound with PCP    Diet ADULT DIET; Regular   DVT Prophylaxis [x] Lovenox, []  Heparin, [] SCDs, [] Ambulation,  [] Eliquis, [] Xarelto  [] Coumadin   Code Status Full Code   Disposition Expected Disposition: SNF  Estimated Date of Discharge: 10/25           Subjective:     Chief Complaint: Near syncope and fall    Patient was seen and examined today sitting up in bed  Vitals reviewed, labs reviewed, nursing notes reviewed  Says she feels okay today, still having some back pain  Has not had a bowel movement in a few days and does not feel like the MiraLAX here is helping  Denies any chest pain or shortness of breath  Does states that her leg still hurts    Review of Systems:      Pertinent positives and negatives discussed in HPI    Objective:

## 2024-10-24 NOTE — PLAN OF CARE
Problem: Discharge Planning  Goal: Discharge to home or other facility with appropriate resources  10/24/2024 0954 by Brenda Hogan RN  Outcome: Progressing     Problem: Pain  Goal: Verbalizes/displays adequate comfort level or baseline comfort level  10/24/2024 0954 by Brenda Hogan RN  Outcome: Progressing     Problem: Safety - Adult  Goal: Free from fall injury  10/24/2024 0954 by Brenda Hogan RN  Outcome: Progressing     Problem: ABCDS Injury Assessment  Goal: Absence of physical injury  10/24/2024 0954 by Brenda Hogan RN  Outcome: Progressing     Problem: Skin/Tissue Integrity  Goal: Absence of new skin breakdown  Description: 1.  Monitor for areas of redness and/or skin breakdown  2.  Assess vascular access sites hourly  3.  Every 4-6 hours minimum:  Change oxygen saturation probe site  4.  Every 4-6 hours:  If on nasal continuous positive airway pressure, respiratory therapy assess nares and determine need for appliance change or resting period.  10/24/2024 0954 by Brenda Hogan RN  Outcome: Progressing

## 2024-10-24 NOTE — PROGRESS NOTES
Physical Therapy  Facility/Department: [email or web address] 5 PROGRESSIVE CARE  Physical Therapy treatment session    Name: Krystina Bajwa  : 1935  MRN: [id number]  Date of Service: 10/24/2024    Discharge Recommendations:  Patient would benefit from continued therapy after discharge (3-5x/wk)   PT Equipment Recommendations  Equipment Needed: No    Krystina Bajwa scored a 17/24 on the AM-PAC short mobility form. Current research shows that an AM-PAC score of 17 or less is typically not associated with a discharge to the patient's home setting. Based on the patient's AM-PAC score and their current functional mobility deficits, it is recommended that the patient have 3-5 sessions per week of Physical Therapy at d/c to increase the patient's independence.  Please see assessment section for further patient specific details.    If patient discharges prior to next session this note will serve as a discharge summary.  Please see below for the latest assessment towards goals.        Patient Diagnosis(es): The primary encounter diagnosis was Fall, initial encounter. Diagnoses of Dizziness, Lumbar transverse process fracture, closed, initial encounter (HCC), Pre-syncope, and Syncope, unspecified syncope type were also pertinent to this visit.  Past Medical History:  has a past medical history of Asthma, Cancer (HCC), CKD (chronic kidney disease), COVID, Depression, GERD (gastroesophageal reflux disease), Neuropathy, Osteoporosis, Pernicious anemia, S/P thyroid biopsy, and Scoliosis.  Past Surgical History:  has a past surgical history that includes  section;  section; knee surgery (Left, 2020); US BIOPSY THYROID (2021); and Upper gastrointestinal endoscopy (N/A, 2021).    Assessment  Body Structures, Functions, Activity Limitations Requiring Skilled Therapeutic Intervention: Decreased functional mobility   Assessment: pt is an 89 yo female who was ad to hosp with prior history of CKD, hypertension,

## 2024-10-24 NOTE — PLAN OF CARE
Problem: Discharge Planning  Goal: Discharge to home or other facility with appropriate resources  10/23/2024 2154 by Melina June RN  Outcome: Progressing  10/23/2024 1333 by Zuleika Dominguez RN  Outcome: Progressing     Problem: Pain  Goal: Verbalizes/displays adequate comfort level or baseline comfort level  10/23/2024 2154 by Melina June RN  Outcome: Progressing  10/23/2024 1333 by Zuleika Dominguez RN  Outcome: Progressing     Problem: Safety - Adult  Goal: Free from fall injury  10/23/2024 2154 by Melina June RN  Outcome: Progressing  10/23/2024 1333 by Zuleika Dominguez RN  Outcome: Progressing     Problem: ABCDS Injury Assessment  Goal: Absence of physical injury  10/23/2024 2154 by Melina June RN  Outcome: Progressing  10/23/2024 1333 by Zuleika Dominguez RN  Outcome: Progressing     Problem: Skin/Tissue Integrity  Goal: Absence of new skin breakdown  Description: 1.  Monitor for areas of redness and/or skin breakdown  2.  Assess vascular access sites hourly  3.  Every 4-6 hours minimum:  Change oxygen saturation probe site  4.  Every 4-6 hours:  If on nasal continuous positive airway pressure, respiratory therapy assess nares and determine need for appliance change or resting period.  10/23/2024 2154 by Melina June RN  Outcome: Progressing  10/23/2024 1333 by Zuleika Dominguez RN  Outcome: Progressing

## 2024-10-25 VITALS
WEIGHT: 147.71 LBS | TEMPERATURE: 98.3 F | RESPIRATION RATE: 21 BRPM | SYSTOLIC BLOOD PRESSURE: 152 MMHG | HEIGHT: 64 IN | BODY MASS INDEX: 25.22 KG/M2 | OXYGEN SATURATION: 92 % | DIASTOLIC BLOOD PRESSURE: 68 MMHG | HEART RATE: 66 BPM

## 2024-10-25 LAB
ANION GAP SERPL CALCULATED.3IONS-SCNC: 6 MMOL/L (ref 3–16)
BASOPHILS # BLD: 0 K/UL (ref 0–0.2)
BASOPHILS NFR BLD: 0.7 %
BUN SERPL-MCNC: 29 MG/DL (ref 7–20)
CALCIUM SERPL-MCNC: 9.4 MG/DL (ref 8.3–10.6)
CHLORIDE SERPL-SCNC: 104 MMOL/L (ref 99–110)
CO2 SERPL-SCNC: 28 MMOL/L (ref 21–32)
CREAT SERPL-MCNC: 1 MG/DL (ref 0.6–1.2)
DEPRECATED RDW RBC AUTO: 14 % (ref 12.4–15.4)
EOSINOPHIL # BLD: 0.2 K/UL (ref 0–0.6)
EOSINOPHIL NFR BLD: 4.2 %
GFR SERPLBLD CREATININE-BSD FMLA CKD-EPI: 54 ML/MIN/{1.73_M2}
GLUCOSE SERPL-MCNC: 78 MG/DL (ref 70–99)
HCT VFR BLD AUTO: 32.6 % (ref 36–48)
HGB BLD-MCNC: 10.7 G/DL (ref 12–16)
LYMPHOCYTES # BLD: 1.2 K/UL (ref 1–5.1)
LYMPHOCYTES NFR BLD: 28.8 %
MCH RBC QN AUTO: 31.2 PG (ref 26–34)
MCHC RBC AUTO-ENTMCNC: 32.8 G/DL (ref 31–36)
MCV RBC AUTO: 95.1 FL (ref 80–100)
MONOCYTES # BLD: 0.7 K/UL (ref 0–1.3)
MONOCYTES NFR BLD: 15.5 %
NEUTROPHILS # BLD: 2.2 K/UL (ref 1.7–7.7)
NEUTROPHILS NFR BLD: 50.8 %
PLATELET # BLD AUTO: 137 K/UL (ref 135–450)
PMV BLD AUTO: 9 FL (ref 5–10.5)
POTASSIUM SERPL-SCNC: 4.4 MMOL/L (ref 3.5–5.1)
RBC # BLD AUTO: 3.43 M/UL (ref 4–5.2)
SODIUM SERPL-SCNC: 138 MMOL/L (ref 136–145)
WBC # BLD AUTO: 4.3 K/UL (ref 4–11)

## 2024-10-25 PROCEDURE — 80048 BASIC METABOLIC PNL TOTAL CA: CPT

## 2024-10-25 PROCEDURE — 97535 SELF CARE MNGMENT TRAINING: CPT

## 2024-10-25 PROCEDURE — 6360000002 HC RX W HCPCS: Performed by: INTERNAL MEDICINE

## 2024-10-25 PROCEDURE — 94640 AIRWAY INHALATION TREATMENT: CPT

## 2024-10-25 PROCEDURE — 6370000000 HC RX 637 (ALT 250 FOR IP): Performed by: NURSE PRACTITIONER

## 2024-10-25 PROCEDURE — 6370000000 HC RX 637 (ALT 250 FOR IP): Performed by: INTERNAL MEDICINE

## 2024-10-25 PROCEDURE — 36415 COLL VENOUS BLD VENIPUNCTURE: CPT

## 2024-10-25 PROCEDURE — 6370000000 HC RX 637 (ALT 250 FOR IP): Performed by: STUDENT IN AN ORGANIZED HEALTH CARE EDUCATION/TRAINING PROGRAM

## 2024-10-25 PROCEDURE — 94760 N-INVAS EAR/PLS OXIMETRY 1: CPT

## 2024-10-25 PROCEDURE — 2580000003 HC RX 258: Performed by: INTERNAL MEDICINE

## 2024-10-25 PROCEDURE — 97530 THERAPEUTIC ACTIVITIES: CPT

## 2024-10-25 PROCEDURE — 85025 COMPLETE CBC W/AUTO DIFF WBC: CPT

## 2024-10-25 PROCEDURE — 97110 THERAPEUTIC EXERCISES: CPT

## 2024-10-25 RX ORDER — CEPHALEXIN 500 MG/1
500 CAPSULE ORAL 4 TIMES DAILY
Qty: 28 CAPSULE | Refills: 0
Start: 2024-10-25 | End: 2024-11-01

## 2024-10-25 RX ADMIN — PANTOPRAZOLE SODIUM 40 MG: 40 TABLET, DELAYED RELEASE ORAL at 05:53

## 2024-10-25 RX ADMIN — DOCUSATE SODIUM 100 MG: 100 CAPSULE, LIQUID FILLED ORAL at 08:43

## 2024-10-25 RX ADMIN — CEFAZOLIN 2000 MG: 2 INJECTION, POWDER, FOR SOLUTION INTRAVENOUS at 05:53

## 2024-10-25 RX ADMIN — CLONAZEPAM 0.5 MG: 0.5 TABLET ORAL at 09:39

## 2024-10-25 RX ADMIN — ACETAMINOPHEN 650 MG: 325 TABLET ORAL at 11:47

## 2024-10-25 RX ADMIN — BUDESONIDE AND FORMOTEROL FUMARATE DIHYDRATE 2 PUFF: 80; 4.5 AEROSOL RESPIRATORY (INHALATION) at 08:08

## 2024-10-25 RX ADMIN — GABAPENTIN 100 MG: 100 CAPSULE ORAL at 08:43

## 2024-10-25 RX ADMIN — MELOXICAM 15 MG: 7.5 TABLET ORAL at 08:43

## 2024-10-25 RX ADMIN — FUROSEMIDE 20 MG: 20 TABLET ORAL at 08:43

## 2024-10-25 RX ADMIN — SODIUM CHLORIDE, PRESERVATIVE FREE 10 ML: 5 INJECTION INTRAVENOUS at 08:45

## 2024-10-25 ASSESSMENT — PAIN - FUNCTIONAL ASSESSMENT: PAIN_FUNCTIONAL_ASSESSMENT: ACTIVITIES ARE NOT PREVENTED

## 2024-10-25 ASSESSMENT — PAIN SCALES - GENERAL
PAINLEVEL_OUTOF10: 4
PAINLEVEL_OUTOF10: 5

## 2024-10-25 NOTE — DISCHARGE INSTR - COC
Pre-syncope R55       Isolation/Infection:   Isolation            No Isolation          Patient Infection Status       None to display                     Nurse Assessment:  Last Vital Signs: BP (!) 152/68   Pulse 66   Temp 98.3 °F (36.8 °C) (Oral)   Resp 21   Ht 1.626 m (5' 4\")   Wt 67 kg (147 lb 11.3 oz)   SpO2 92%   BMI 25.35 kg/m²     Last documented pain score (0-10 scale): Pain Level: 5  Last Weight:   Wt Readings from Last 1 Encounters:   10/25/24 67 kg (147 lb 11.3 oz)     Mental Status:  oriented and alert    IV Access:  - None    Nursing Mobility/ADLs:  Walking   Assisted  Transfer  Assisted  Bathing  Assisted  Dressing  Assisted  Toileting  Assisted  Feeding  Independent  Med Admin  Assisted  Med Delivery   whole    Wound Care Documentation and Therapy:  Incision 07/31/20 Knee Anterior;Left (Active)   Number of days: 1547        Elimination:  Continence:   Bowel: Yes  Bladder: Yes  Urinary Catheter: None   Colostomy/Ileostomy/Ileal Conduit: No       Date of Last BM: 10/21    Intake/Output Summary (Last 24 hours) at 10/25/2024 1044  Last data filed at 10/24/2024 1316  Gross per 24 hour   Intake 20 ml   Output 1000 ml   Net -980 ml     I/O last 3 completed shifts:  In: 260 [P.O.:260]  Out: 1000 [Urine:1000]    Safety Concerns:     History of Falls (last 30 days) and At Risk for Falls    Impairments/Disabilities:      None    Nutrition Therapy:  Current Nutrition Therapy:   - Oral Diet:  General    Routes of Feeding: Oral  Liquids: Thin Liquids  Daily Fluid Restriction: no  Last Modified Barium Swallow with Video (Video Swallowing Test): not done    Treatments at the Time of Hospital Discharge:   Respiratory Treatments: yes  Oxygen Therapy:  is not on home oxygen therapy.  Ventilator:    - No ventilator support    Rehab Therapies: Physical Therapy and Occupational Therapy  Weight Bearing Status/Restrictions: No weight bearing restrictions  Other Medical Equipment (for information only, NOT a DME  order):  walker  Other Treatments: na    Patient's personal belongings (please select all that are sent with patient):  Glasses, clothes, ring, cell phone    RN SIGNATURE:  Electronically signed by Mimi Aly RN on 10/25/24 at 12:29 PM EDT    CASE MANAGEMENT/SOCIAL WORK SECTION    Inpatient Status Date: 10/25/24    Readmission Risk Assessment Score:  Readmission Risk              Risk of Unplanned Readmission:  12         Discharging to Facility/ Agency   Name: [facility]  Address:  [address] [place] [address]   Phone:  [phone]  Fax:  [phone]     / signature: Electronically signed by Blossom Fisher RN on 10/25/24 at 11:04 AM EDT    PHYSICIAN SECTION    Prognosis: Good    Condition at Discharge: Stable    Rehab Potential (if transferring to Rehab): N/a    Recommended Labs or Other Treatments After Discharge: none    Physician Certification: I certify the above information and transfer of Krystina Bajwa  is necessary for the continuing treatment of the diagnosis listed and that she requires Skilled Nursing Facility for less 30 days.     Update Admission H&P: No change in H&P    PHYSICIAN SIGNATURE:  Electronically signed by Wilber Pineda DO on 10/25/24 at 10:45 AM EDT

## 2024-10-25 NOTE — CARE COORDINATION
Case Management Discharge Note          Date / Time of Note: 10/25/2024 11:02 AM                  Patient Name: Krystina Bajwa   YOB: 1935  Diagnosis: Dizziness [R42]  Pre-syncope [R55]  Fall, initial encounter [W19.XXXA]  Lumbar transverse process fracture, closed, initial encounter ([facility]) [S32.009A]   Date / Time: 10/22/2024  7:30 AM    Financial:  Payor: MEDICARE / Plan: MEDICARE PART A AND B / Product Type: *No Product type* /      Pharmacy:    [facility] [address] - P [phone] - F [phone]  [facility]  [place] [address]  Phone: [phone] Fax: [phone]    [facility] [address] - P [phone] - F [phone]  [address] [place] [address]  Phone: [phone] Fax: [phone]      Assistance purchasing medications?: Potential Assistance Purchasing Medications: No  Assistance provided by Case Management: None at this time    DISCHARGE Disposition: Skilled Nursing Facility (SNF)    Nursing Facility:   Discharging to Facility/ Agency   Name: [facility]  Address:  [address] [place] [address]   Phone:  [phone]  Fax:  [phone]     LOC at discharge: Skilled Nursing  KARISSA Completed: Yes             Notification completed in HENS/PAS?:  Yes : CM has completed HENS online through secure website for SNF admission at [facility].   Document ID #: [id number]    Transportation:  Transportation PLAN for discharge: EMS transportation   Mode of Transport: Ambulance stretcher - [facility]  Reason for medical transport: Bed confined: Meets the following criteria 1) unable to get out of bed without assistance or ambulate, 2) unable to safely sit up in a wheelchair, 3) unable to maintain erect seating position in a chair for time needed for transport  Name of Transport Company: [facility]  Phone: [phone]  Time of Transport: 3  pm    Transport form completed: Yes    IMM Completed:   Yes, Case management has presented and reviewed IMM letter #2.       IMM Letter date given:: 10/25/24  IMM Letter time given:: 1125.   Patient and/or family/POA verbalized understanding of their medicare rights and appeal process if needed. Patient and/or family/POA has signed, initialed and placed the date and time on IMM letter #2 on the the appropriate lines. Copy of letter offered and they are aware that the original copy of IMM letter #2 is available prior to discharge from the paper chart on the unit.  Electronic documentation has been entered into epic for IMM letter #2 and original paper copy has been added to the paper chart at the nurses station.     Additional CM Notes:   DC order noted.  Spoke to patient at bedside.  Patient will notify family. Transportation arranged with [facility] at 3 pm.  Admission liaison notified of discharge and transport.  Bedside nurse made aware.     The Plan for Transition of Care is related to the following treatment goals of Dizziness [R42]  Pre-syncope [R55]  Fall, initial encounter [W19.XXXA]  Lumbar transverse process fracture, closed, initial encounter (HCC) [S32.009A]    The Patient and/or patient representative Krystina and her family were provided with a choice of provider and agrees with the discharge plan Yes    Freedom of choice list was provided with basic dialogue that supports the patient's individualized plan of care/goals and shares the quality data associated with the providers. Yes    Blossom Fisher RN  [facility]   Case Management Department  Ph: [phone]

## 2024-10-25 NOTE — PROGRESS NOTES
Physical Therapy  Facility/Department: [address] PROGRESSIVE CARE  Physical Therapy Treatment Note    Name: Krystina Bajwa  : 1935  MRN: [id number]  Date of Service: 10/25/2024    Discharge Recommendations:  Patient would benefit from continued therapy after discharge, Subacute/Skilled Nursing Facility (3-5x/wk)   PT Equipment Recommendations  Equipment Needed: No    Krystina Bajwa scored a 17/24 on the AM-PAC short mobility form. Current research shows that an AM-PAC score of 17 or less is typically not associated with a discharge to the patient's home setting. Based on the patient's AM-PAC score and their current functional mobility deficits, it is recommended that the patient have 3-5 sessions per week of Physical Therapy at d/c to increase the patient's independence.  Please see assessment section for further patient specific details.    If patient discharges prior to next session this note will serve as a discharge summary.  Please see below for the latest assessment towards goals.       Patient Diagnosis(es): The primary encounter diagnosis was Fall, initial encounter. Diagnoses of Dizziness, Lumbar transverse process fracture, closed, initial encounter (HCC), Pre-syncope, and Syncope, unspecified syncope type were also pertinent to this visit.  Past Medical History:  has a past medical history of Asthma, Cancer (HCC), CKD (chronic kidney disease), COVID, Depression, GERD (gastroesophageal reflux disease), Neuropathy, Osteoporosis, Pernicious anemia, S/P thyroid biopsy, and Scoliosis.  Past Surgical History:  has a past surgical history that includes  section;  section; knee surgery (Left, 2020); US BIOPSY THYROID (2021); and Upper gastrointestinal endoscopy (N/A, 2021).    Assessment  Body Structures, Functions, Activity Limitations Requiring Skilled Therapeutic Intervention: Decreased functional mobility     Assessment: pt is an 89 yo female who was ad to hosp with  was unable to do so.  She finally tried to get up but she lost her balance and fell.  Per neurosurgery, no braceor intervention needed for L2 TP fx.  Response To Previous Treatment: Patient with no complaints from previous session.  Family / Caregiver Present: No  Referring Practitioner: Dr Anaya  Referral Date : 10/22/24  Follows Commands: Within Functional Limits  Subjective  Subjective: Pt found sitting in recliner upon arrival. Pleasant and agreeable to PT treatment. Reports chronic back pain.         Social/Functional History  Social/Functional History  Lives With: Alone (spouse in LTC)  Type of Home: House  Home Layout: Laundry in basement, Two level, Bed/Bath upstairs (2 story + basement)  Home Access: Stairs to enter with rails  Entrance Stairs - Number of Steps: 5 MATTHEW; no bathroom main floor; bed/bath upstairs; 7 steps upstairs and 7 steps down to laundry  Bathroom Shower/Tub: Walk-in shower  Home Equipment: Walker - 4-Wheeled, Walker - Rolling  Has the patient had two or more falls in the past year or any fall with injury in the past year?:  (one fall PTA)  ADL Assistance: Independent  Homemaking Assistance:  (family help with household task; light cooking with microwave meals)  Ambulation Assistance: Independent (with 4WW)  Transfer Assistance: Independent  Active : No (family drive)  Additional Comments: Pt reports spouse is in LTC at [facility] and pt hopeful to [facility] to [facility] at discharge.  Vision/Hearing  Vision  Vision Exceptions: Wears glasses at all times  Hearing  Hearing: Exceptions to WFL  Hearing Exceptions: Hard of hearing/hearing concerns;No hearing aid    Cognition   Orientation  Overall Orientation Status: Within Functional Limits  Cognition  Overall Cognitive Status: Exceptions  Arousal/Alertness: Appears intact  Following Commands: Appears intact  Attention Span: Attends with cues to redirect  Memory: Appears intact  Safety Judgement: Impaired  Problem Solving:

## 2024-10-25 NOTE — PLAN OF CARE
Problem: Discharge Planning  Goal: Discharge to home or other facility with appropriate resources  Outcome: Progressing     Problem: Pain  Goal: Verbalizes/displays adequate comfort level or baseline comfort level  Outcome: Progressing     Problem: Safety - Adult  Goal: Free from fall injury  Outcome: Progressing     Problem: ABCDS Injury Assessment  Goal: Absence of physical injury  Outcome: Progressing     Problem: Skin/Tissue Integrity  Goal: Absence of new skin breakdown  Description: 1.  Monitor for areas of redness and/or skin breakdown  2.  Assess vascular access sites hourly  3.  Every 4-6 hours minimum:  Change oxygen saturation probe site  4.  Every 4-6 hours:  If on nasal continuous positive airway pressure, respiratory therapy assess nares and determine need for appliance change or resting period.  Outcome: Progressing   Alert and oriented x4 Resp. Easy and even Sats. WNL on RA Lungs clear but diminished Cough and deep breathing exercises encouraged c/o sore throat with new rx. With good effect. Purwik in place with good output Turns and repositions self

## 2024-10-25 NOTE — PROGRESS NOTES
Occupational Therapy  Facility/Department: [facility] 5W PROGRESSIVE CARE  Occupational Therapy Daily Treatment    Name: Krystina Bajwa  : 1935  MRN: [id number]  Date of Service: 10/25/2024    Discharge Recommendations:  Patient would benefit from continued therapy after discharge, 3-5 sessions per week  OT Equipment Recommendations  Other: defer to MA facility  Krystina Bajwa scored a 15/24 on the AM-PAC ADL Inpatient form. Current research shows that an AM-PAC score of 17 or less is typically not associated with a discharge to the patient's home setting. Based on the patient's AM-PAC score and their current ADL deficits, it is recommended that the patient have 3-5 sessions per week of Occupational Therapy at d/c to increase the patient's independence.  Please see assessment section for further patient specific details.    If patient discharges prior to next session this note will serve as a discharge summary.  Please see below for the latest assessment towards goals.       Patient Diagnosis(es): The primary encounter diagnosis was Fall, initial encounter. Diagnoses of Dizziness, Lumbar transverse process fracture, closed, initial encounter (HCC), Pre-syncope, and Syncope, unspecified syncope type were also pertinent to this visit.  Past Medical History:  has a past medical history of Asthma, Cancer (HCC), CKD (chronic kidney disease), COVID, Depression, GERD (gastroesophageal reflux disease), Neuropathy, Osteoporosis, Pernicious anemia, S/P thyroid biopsy, and Scoliosis.  Past Surgical History:  has a past surgical history that includes  section;  section; knee surgery (Left, 2020); US BIOPSY THYROID (2021); and Upper gastrointestinal endoscopy (N/A, 2021).           Assessment  Performance deficits / Impairments: Decreased functional mobility ;Decreased safe awareness;Decreased balance;Decreased ADL status;Decreased endurance;Decreased high-level IADLs;Decreased

## 2024-10-25 NOTE — DISCHARGE SUMMARY
CHEST PORTABLE    Result Date: 10/22/2024  EXAMINATION: ONE XRAY VIEW OF THE CHEST 10/22/2024 8:17 am COMPARISON: 09/23/2022 HISTORY: ORDERING SYSTEM PROVIDED HISTORY: Stroke eval TECHNOLOGIST PROVIDED HISTORY: Reason for exam:->Stroke eval Reason for Exam: Stroke eval FINDINGS: The lungs are without acute focal process.  There is no effusion or pneumothorax. The cardiomediastinal silhouette is stable. The osseous structures are stable.     No acute process.       CBC:   Recent Labs     10/23/24  0501 10/25/24  0512   WBC 4.2 4.3   HGB 11.0* 10.7*    137     BMP:    Recent Labs     10/23/24  0501 10/25/24  0512    138   K 4.5 4.4    104   CO2 29 28   BUN 25* 29*   CREATININE 1.0 1.0   GLUCOSE 89 78     Hepatic: No results for input(s): \"AST\", \"ALT\", \"BILITOT\", \"ALKPHOS\" in the last 72 hours.    Invalid input(s): \"ALB\"  Lipids: No results found for: \"CHOL\", \"HDL\", \"TRIG\"  Hemoglobin A1C: No results found for: \"LABA1C\"  TSH:   Lab Results   Component Value Date/Time    TSH 1.59 11/04/2021 11:39 AM     Troponin: No results found for: \"TROPONINT\"  Lactic Acid: No results for input(s): \"LACTA\" in the last 72 hours.  BNP: No results for input(s): \"PROBNP\" in the last 72 hours.  UA:  Lab Results   Component Value Date/Time    NITRU Negative 11/08/2022 06:50 PM    COLORU Yellow 11/08/2022 06:50 PM    PHUR 7.0 11/08/2022 06:50 PM    WBCUA 1 02/10/2018 01:45 PM    RBCUA 3 02/10/2018 01:45 PM    CLARITYU Clear 11/08/2022 06:50 PM    LEUKOCYTESUR Negative 11/08/2022 06:50 PM    UROBILINOGEN 0.2 11/08/2022 06:50 PM    BILIRUBINUR Negative 11/08/2022 06:50 PM    BLOODU Negative 11/08/2022 06:50 PM    GLUCOSEU Negative 11/08/2022 06:50 PM    KETUA Negative 11/08/2022 06:50 PM     Urine Cultures: No results found for: \"LABURIN\"  Blood Cultures: No results found for: \"BC\"  No results found for: \"BLOODCULT2\"  Organism: No results found for: \"ORG\"    Time Spent Discharging patient 39 minutes    Electronically signed  by Wilber Pineda, DO on 10/25/2024 at 1:22 PM

## 2025-01-20 NOTE — PROGRESS NOTES
Consent signed for EGD tomorrow with Dr. Shira Fishman. While this RN was obtaining consent, patient states that she does not want to be a full code and does not wish to be on a ventilator. Dr. Alexandria Valentine notified and this RN attempted to get a hold of social work      [phone]: this RN spoke with patients daughter Lyndsay Stovall did confirm that her mother wishes to be a DNR.  This RN communicated this with Dr. Alexandria Valentine     Electronically signed by Mary Hernández RN on 11/4/2021 at 3:48 PM Medication: atorvastin  passed protocol.   Last office visit date: 4/12/24  Next appointment scheduled?: Yes   Number of refills given: 0

## 2025-08-29 ENCOUNTER — APPOINTMENT (OUTPATIENT)
Dept: CT IMAGING | Age: 89
DRG: 312 | End: 2025-08-29
Payer: MEDICARE

## 2025-08-29 ENCOUNTER — APPOINTMENT (OUTPATIENT)
Dept: GENERAL RADIOLOGY | Age: 89
DRG: 312 | End: 2025-08-29
Payer: MEDICARE

## 2025-08-29 ENCOUNTER — HOSPITAL ENCOUNTER (INPATIENT)
Age: 89
LOS: 1 days | Discharge: NURSING, REHAB OR LONG-TERM CARE | DRG: 312 | End: 2025-08-31
Attending: EMERGENCY MEDICINE | Admitting: INTERNAL MEDICINE
Payer: MEDICARE

## 2025-08-29 DIAGNOSIS — R55 PRE-SYNCOPE: ICD-10-CM

## 2025-08-29 DIAGNOSIS — S70.02XA CONTUSION OF LEFT HIP, INITIAL ENCOUNTER: ICD-10-CM

## 2025-08-29 DIAGNOSIS — I47.10 PSVT (PAROXYSMAL SUPRAVENTRICULAR TACHYCARDIA): ICD-10-CM

## 2025-08-29 DIAGNOSIS — S09.90XA MINOR HEAD INJURY, INITIAL ENCOUNTER: ICD-10-CM
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
## 2025-08-29 DIAGNOSIS — R55 SYNCOPE, UNSPECIFIED SYNCOPE TYPE: ICD-10-CM

## 2025-08-29 PROBLEM — R42 LIGHTHEADEDNESS: Status: ACTIVE | Noted: 2025-08-29

## 2025-08-29 PROBLEM — I27.20 PULMONARY HYPERTENSION (HCC): Status: ACTIVE | Noted: 2025-08-29

## 2025-08-29 PROBLEM — R79.89 ELEVATED TROPONIN: Status: ACTIVE | Noted: 2025-08-29

## 2025-08-29 PROBLEM — I50.32 CHRONIC DIASTOLIC HEART FAILURE (HCC): Status: ACTIVE | Noted: 2025-08-29

## 2025-08-29 LAB
ALBUMIN SERPL-MCNC: 4.1 G/DL (ref 3.4–5)
ALBUMIN/GLOB SERPL: 1.5 {RATIO} (ref 1.1–2.2)
ALP SERPL-CCNC: 72 U/L (ref 40–129)
ALT SERPL-CCNC: 13 U/L (ref 10–40)
ANION GAP SERPL CALCULATED.3IONS-SCNC: 13 MMOL/L (ref 3–16)
AST SERPL-CCNC: 24 U/L (ref 15–37)
BASOPHILS # BLD: 0.1 K/UL (ref 0–0.2)
BASOPHILS NFR BLD: 0.9 %
BILIRUB SERPL-MCNC: 0.4 MG/DL (ref 0–1)
BUN SERPL-MCNC: 30 MG/DL (ref 7–20)
CALCIUM SERPL-MCNC: 10.5 MG/DL (ref 8.3–10.6)
CHLORIDE SERPL-SCNC: 103 MMOL/L (ref 99–110)
CO2 SERPL-SCNC: 24 MMOL/L (ref 21–32)
CREAT SERPL-MCNC: 1.2 MG/DL (ref 0.6–1.2)
DEPRECATED RDW RBC AUTO: 14.4 % (ref 12.4–15.4)
EKG ATRIAL RATE: 72 BPM
EKG DIAGNOSIS: NORMAL
EKG P AXIS: 64 DEGREES
EKG P-R INTERVAL: 138 MS
EKG Q-T INTERVAL: 438 MS
EKG QRS DURATION: 140 MS
EKG QTC CALCULATION (BAZETT): 479 MS
EKG R AXIS: 63 DEGREES
EKG T AXIS: 32 DEGREES
EKG VENTRICULAR RATE: 72 BPM
EOSINOPHIL # BLD: 0.3 K/UL (ref 0–0.6)
EOSINOPHIL NFR BLD: 3.6 %
GFR SERPLBLD CREATININE-BSD FMLA CKD-EPI: 44 ML/MIN/{1.73_M2}
GLUCOSE SERPL-MCNC: 97 MG/DL (ref 70–99)
HCT VFR BLD AUTO: 41.2 % (ref 36–48)
HGB BLD-MCNC: 13.5 G/DL (ref 12–16)
LYMPHOCYTES # BLD: 1.8 K/UL (ref 1–5.1)
LYMPHOCYTES NFR BLD: 23.5 %
MCH RBC QN AUTO: 31.7 PG (ref 26–34)
MCHC RBC AUTO-ENTMCNC: 32.8 G/DL (ref 31–36)
MCV RBC AUTO: 96.8 FL (ref 80–100)
MONOCYTES # BLD: 0.8 K/UL (ref 0–1.3)
MONOCYTES NFR BLD: 10.1 %
NEUTROPHILS # BLD: 4.8 K/UL (ref 1.7–7.7)
NEUTROPHILS NFR BLD: 61.9 %
NT-PROBNP SERPL-MCNC: 226 PG/ML (ref 0–449)
PLATELET # BLD AUTO: 197 K/UL (ref 135–450)
PMV BLD AUTO: 8.3 FL (ref 5–10.5)
POTASSIUM SERPL-SCNC: 4 MMOL/L (ref 3.5–5.1)
PROT SERPL-MCNC: 6.9 G/DL (ref 6.4–8.2)
RBC # BLD AUTO: 4.25 M/UL (ref 4–5.2)
SODIUM SERPL-SCNC: 140 MMOL/L (ref 136–145)
TROPONIN, HIGH SENSITIVITY: 61 NG/L (ref 0–14)
TROPONIN, HIGH SENSITIVITY: 64 NG/L (ref 0–14)
WBC # BLD AUTO: 7.8 K/UL (ref 4–11)

## 2025-08-29 PROCEDURE — 6370000000 HC RX 637 (ALT 250 FOR IP): Performed by: EMERGENCY MEDICINE

## 2025-08-29 PROCEDURE — 84484 ASSAY OF TROPONIN QUANT: CPT

## 2025-08-29 PROCEDURE — 96372 THER/PROPH/DIAG INJ SC/IM: CPT

## 2025-08-29 PROCEDURE — 93010 ELECTROCARDIOGRAM REPORT: CPT | Performed by: INTERNAL MEDICINE

## 2025-08-29 PROCEDURE — 99223 1ST HOSP IP/OBS HIGH 75: CPT | Performed by: INTERNAL MEDICINE

## 2025-08-29 PROCEDURE — 6360000002 HC RX W HCPCS: Performed by: INTERNAL MEDICINE

## 2025-08-29 PROCEDURE — G0378 HOSPITAL OBSERVATION PER HR: HCPCS

## 2025-08-29 PROCEDURE — 99285 EMERGENCY DEPT VISIT HI MDM: CPT

## 2025-08-29 PROCEDURE — 6370000000 HC RX 637 (ALT 250 FOR IP): Performed by: INTERNAL MEDICINE

## 2025-08-29 PROCEDURE — 83880 ASSAY OF NATRIURETIC PEPTIDE: CPT

## 2025-08-29 PROCEDURE — 2500000003 HC RX 250 WO HCPCS: Performed by: INTERNAL MEDICINE

## 2025-08-29 PROCEDURE — 71045 X-RAY EXAM CHEST 1 VIEW: CPT

## 2025-08-29 PROCEDURE — 70450 CT HEAD/BRAIN W/O DYE: CPT

## 2025-08-29 PROCEDURE — 97535 SELF CARE MNGMENT TRAINING: CPT

## 2025-08-29 PROCEDURE — 94760 N-INVAS EAR/PLS OXIMETRY 1: CPT

## 2025-08-29 PROCEDURE — 85025 COMPLETE CBC W/AUTO DIFF WBC: CPT

## 2025-08-29 PROCEDURE — 94640 AIRWAY INHALATION TREATMENT: CPT

## 2025-08-29 PROCEDURE — 93005 ELECTROCARDIOGRAM TRACING: CPT | Performed by: EMERGENCY MEDICINE

## 2025-08-29 PROCEDURE — 97165 OT EVAL LOW COMPLEX 30 MIN: CPT

## 2025-08-29 PROCEDURE — 80053 COMPREHEN METABOLIC PANEL: CPT

## 2025-08-29 PROCEDURE — 73502 X-RAY EXAM HIP UNI 2-3 VIEWS: CPT

## 2025-08-29 RX ORDER — PANTOPRAZOLE SODIUM 40 MG/1
40 TABLET, DELAYED RELEASE ORAL DAILY
Status: DISCONTINUED | OUTPATIENT
Start: 2025-08-29 | End: 2025-08-31 | Stop reason: HOSPADM

## 2025-08-29 RX ORDER — POLYETHYLENE GLYCOL 3350 17 G/17G
17 POWDER, FOR SOLUTION ORAL DAILY PRN
Status: DISCONTINUED | OUTPATIENT
Start: 2025-08-29 | End: 2025-08-31 | Stop reason: HOSPADM

## 2025-08-29 RX ORDER — FAMOTIDINE 20 MG/1
20 TABLET, FILM COATED ORAL 2 TIMES DAILY PRN
COMMUNITY

## 2025-08-29 RX ORDER — GABAPENTIN 100 MG/1
100 CAPSULE ORAL 2 TIMES DAILY
Status: DISCONTINUED | OUTPATIENT
Start: 2025-08-29 | End: 2025-08-31 | Stop reason: HOSPADM

## 2025-08-29 RX ORDER — ACETAMINOPHEN 500 MG
1000 TABLET ORAL ONCE
Status: COMPLETED | OUTPATIENT
Start: 2025-08-29 | End: 2025-08-29

## 2025-08-29 RX ORDER — ONDANSETRON 2 MG/ML
4 INJECTION INTRAMUSCULAR; INTRAVENOUS EVERY 6 HOURS PRN
Status: DISCONTINUED | OUTPATIENT
Start: 2025-08-29 | End: 2025-08-31 | Stop reason: HOSPADM

## 2025-08-29 RX ORDER — CLONAZEPAM 0.5 MG/1
0.5 TABLET ORAL 2 TIMES DAILY PRN
Status: DISCONTINUED | OUTPATIENT
Start: 2025-08-29 | End: 2025-08-31 | Stop reason: HOSPADM

## 2025-08-29 RX ORDER — ALBUTEROL SULFATE 90 UG/1
2 INHALANT RESPIRATORY (INHALATION) EVERY 6 HOURS PRN
Status: DISCONTINUED | OUTPATIENT
Start: 2025-08-29 | End: 2025-08-31 | Stop reason: HOSPADM

## 2025-08-29 RX ORDER — ENOXAPARIN SODIUM 100 MG/ML
40 INJECTION SUBCUTANEOUS DAILY
Status: DISCONTINUED | OUTPATIENT
Start: 2025-08-29 | End: 2025-08-31 | Stop reason: HOSPADM

## 2025-08-29 RX ORDER — ACETAMINOPHEN 650 MG/1
650 SUPPOSITORY RECTAL EVERY 6 HOURS PRN
Status: DISCONTINUED | OUTPATIENT
Start: 2025-08-29 | End: 2025-08-31 | Stop reason: HOSPADM

## 2025-08-29 RX ORDER — ONDANSETRON 4 MG/1
4 TABLET, ORALLY DISINTEGRATING ORAL EVERY 8 HOURS PRN
Status: DISCONTINUED | OUTPATIENT
Start: 2025-08-29 | End: 2025-08-31 | Stop reason: HOSPADM

## 2025-08-29 RX ORDER — SODIUM CHLORIDE 0.9 % (FLUSH) 0.9 %
5-40 SYRINGE (ML) INJECTION PRN
Status: DISCONTINUED | OUTPATIENT
Start: 2025-08-29 | End: 2025-08-31 | Stop reason: HOSPADM

## 2025-08-29 RX ORDER — POTASSIUM CHLORIDE 1500 MG/1
40 TABLET, EXTENDED RELEASE ORAL PRN
Status: DISCONTINUED | OUTPATIENT
Start: 2025-08-29 | End: 2025-08-31 | Stop reason: HOSPADM

## 2025-08-29 RX ORDER — BUDESONIDE AND FORMOTEROL FUMARATE DIHYDRATE 80; 4.5 UG/1; UG/1
2 AEROSOL RESPIRATORY (INHALATION)
Status: DISCONTINUED | OUTPATIENT
Start: 2025-08-29 | End: 2025-08-31 | Stop reason: HOSPADM

## 2025-08-29 RX ORDER — DOCUSATE SODIUM 100 MG/1
100 CAPSULE, LIQUID FILLED ORAL 2 TIMES DAILY
COMMUNITY

## 2025-08-29 RX ORDER — POTASSIUM CHLORIDE 7.45 MG/ML
10 INJECTION INTRAVENOUS PRN
Status: DISCONTINUED | OUTPATIENT
Start: 2025-08-29 | End: 2025-08-31 | Stop reason: HOSPADM

## 2025-08-29 RX ORDER — FUROSEMIDE 20 MG/1
20 TABLET ORAL DAILY
Status: DISCONTINUED | OUTPATIENT
Start: 2025-08-29 | End: 2025-08-31 | Stop reason: HOSPADM

## 2025-08-29 RX ORDER — SENNOSIDES 8.6 MG/1
2 TABLET ORAL
COMMUNITY

## 2025-08-29 RX ORDER — ACETAMINOPHEN 325 MG/1
650 TABLET ORAL EVERY 6 HOURS PRN
Status: DISCONTINUED | OUTPATIENT
Start: 2025-08-29 | End: 2025-08-31 | Stop reason: HOSPADM

## 2025-08-29 RX ORDER — SODIUM CHLORIDE 0.9 % (FLUSH) 0.9 %
5-40 SYRINGE (ML) INJECTION EVERY 12 HOURS SCHEDULED
Status: DISCONTINUED | OUTPATIENT
Start: 2025-08-29 | End: 2025-08-31 | Stop reason: HOSPADM

## 2025-08-29 RX ORDER — FUROSEMIDE 20 MG/1
20 TABLET ORAL DAILY
COMMUNITY

## 2025-08-29 RX ORDER — METHOCARBAMOL 500 MG/1
500 TABLET, FILM COATED ORAL EVERY 12 HOURS PRN
COMMUNITY

## 2025-08-29 RX ORDER — SODIUM CHLORIDE 9 MG/ML
INJECTION, SOLUTION INTRAVENOUS PRN
Status: DISCONTINUED | OUTPATIENT
Start: 2025-08-29 | End: 2025-08-31 | Stop reason: HOSPADM

## 2025-08-29 RX ORDER — MAGNESIUM SULFATE IN WATER 40 MG/ML
2000 INJECTION, SOLUTION INTRAVENOUS PRN
Status: DISCONTINUED | OUTPATIENT
Start: 2025-08-29 | End: 2025-08-31 | Stop reason: HOSPADM

## 2025-08-29 RX ORDER — SERTRALINE HYDROCHLORIDE 25 MG/1
50 TABLET, FILM COATED ORAL DAILY
COMMUNITY

## 2025-08-29 RX ADMIN — SODIUM CHLORIDE, PRESERVATIVE FREE 10 ML: 5 INJECTION INTRAVENOUS at 11:18

## 2025-08-29 RX ADMIN — GABAPENTIN 100 MG: 100 CAPSULE ORAL at 11:18

## 2025-08-29 RX ADMIN — FUROSEMIDE 20 MG: 20 TABLET ORAL at 11:18

## 2025-08-29 RX ADMIN — ACETAMINOPHEN 650 MG: 325 TABLET ORAL at 16:18

## 2025-08-29 RX ADMIN — GABAPENTIN 100 MG: 100 CAPSULE ORAL at 20:10

## 2025-08-29 RX ADMIN — CLONAZEPAM 0.5 MG: 0.5 TABLET ORAL at 18:39

## 2025-08-29 RX ADMIN — ALBUTEROL SULFATE 2 PUFF: 90 AEROSOL, METERED RESPIRATORY (INHALATION) at 21:14

## 2025-08-29 RX ADMIN — ACETAMINOPHEN 1000 MG: 500 TABLET ORAL at 05:42

## 2025-08-29 RX ADMIN — ALBUTEROL SULFATE 2 PUFF: 90 AEROSOL, METERED RESPIRATORY (INHALATION) at 11:29

## 2025-08-29 RX ADMIN — ENOXAPARIN SODIUM 40 MG: 100 INJECTION SUBCUTANEOUS at 11:18

## 2025-08-29 RX ADMIN — PANTOPRAZOLE SODIUM 40 MG: 40 TABLET, DELAYED RELEASE ORAL at 11:18

## 2025-08-29 RX ADMIN — SERTRALINE 50 MG: 50 TABLET, FILM COATED ORAL at 11:18

## 2025-08-29 RX ADMIN — SODIUM CHLORIDE, PRESERVATIVE FREE 10 ML: 5 INJECTION INTRAVENOUS at 21:00

## 2025-08-29 RX ADMIN — ONDANSETRON 4 MG: 4 TABLET, ORALLY DISINTEGRATING ORAL at 16:18

## 2025-08-29 RX ADMIN — BUDESONIDE AND FORMOTEROL FUMARATE DIHYDRATE 2 PUFF: 80; 4.5 AEROSOL RESPIRATORY (INHALATION) at 19:19

## 2025-08-29 RX ADMIN — BUDESONIDE AND FORMOTEROL FUMARATE DIHYDRATE 2 PUFF: 80; 4.5 AEROSOL RESPIRATORY (INHALATION) at 11:29

## 2025-08-29 ASSESSMENT — PAIN - FUNCTIONAL ASSESSMENT
PAIN_FUNCTIONAL_ASSESSMENT: 0-10
PAIN_FUNCTIONAL_ASSESSMENT: ACTIVITIES ARE NOT PREVENTED
PAIN_FUNCTIONAL_ASSESSMENT: ACTIVITIES ARE NOT PREVENTED

## 2025-08-29 ASSESSMENT — PAIN DESCRIPTION - LOCATION
LOCATION: HIP
LOCATION: HIP
LOCATION: HEAD
LOCATION: HEAD
LOCATION: HEAD;NECK
LOCATION: HEAD

## 2025-08-29 ASSESSMENT — PAIN DESCRIPTION - ORIENTATION
ORIENTATION: POSTERIOR
ORIENTATION: LEFT
ORIENTATION: LEFT

## 2025-08-29 ASSESSMENT — PAIN SCALES - GENERAL
PAINLEVEL_OUTOF10: 2
PAINLEVEL_OUTOF10: 2
PAINLEVEL_OUTOF10: 4
PAINLEVEL_OUTOF10: 4
PAINLEVEL_OUTOF10: 5
PAINLEVEL_OUTOF10: 2
PAINLEVEL_OUTOF10: 0
PAINLEVEL_OUTOF10: 5
PAINLEVEL_OUTOF10: 0

## 2025-08-29 ASSESSMENT — PAIN DESCRIPTION - DESCRIPTORS
DESCRIPTORS: ACHING
DESCRIPTORS: ACHING;DULL
DESCRIPTORS: ACHING
DESCRIPTORS: ACHING

## 2025-08-29 ASSESSMENT — LIFESTYLE VARIABLES
HOW MANY STANDARD DRINKS CONTAINING ALCOHOL DO YOU HAVE ON A TYPICAL DAY: PATIENT DOES NOT DRINK
HOW OFTEN DO YOU HAVE A DRINK CONTAINING ALCOHOL: NEVER

## 2025-08-29 ASSESSMENT — PAIN DESCRIPTION - ONSET: ONSET: ON-GOING

## 2025-08-30 LAB
ALBUMIN SERPL-MCNC: 3.5 G/DL (ref 3.4–5)
ALBUMIN/GLOB SERPL: 1.6 {RATIO} (ref 1.1–2.2)
ALP SERPL-CCNC: 56 U/L (ref 40–129)
ALT SERPL-CCNC: 11 U/L (ref 10–40)
ANION GAP SERPL CALCULATED.3IONS-SCNC: 7 MMOL/L (ref 3–16)
AST SERPL-CCNC: 19 U/L (ref 15–37)
BASOPHILS # BLD: 0 K/UL (ref 0–0.2)
BASOPHILS NFR BLD: 1.1 %
BILIRUB SERPL-MCNC: 0.4 MG/DL (ref 0–1)
BUN SERPL-MCNC: 21 MG/DL (ref 7–20)
CALCIUM SERPL-MCNC: 9.8 MG/DL (ref 8.3–10.6)
CHLORIDE SERPL-SCNC: 106 MMOL/L (ref 99–110)
CO2 SERPL-SCNC: 29 MMOL/L (ref 21–32)
CREAT SERPL-MCNC: 0.9 MG/DL (ref 0.6–1.2)
DEPRECATED RDW RBC AUTO: 13.7 % (ref 12.4–15.4)
EOSINOPHIL # BLD: 0.2 K/UL (ref 0–0.6)
EOSINOPHIL NFR BLD: 4.8 %
GFR SERPLBLD CREATININE-BSD FMLA CKD-EPI: 62 ML/MIN/{1.73_M2}
GLUCOSE SERPL-MCNC: 93 MG/DL (ref 70–99)
HCT VFR BLD AUTO: 34.1 % (ref 36–48)
HGB BLD-MCNC: 11.6 G/DL (ref 12–16)
LYMPHOCYTES # BLD: 1.5 K/UL (ref 1–5.1)
LYMPHOCYTES NFR BLD: 32.8 %
MCH RBC QN AUTO: 31.8 PG (ref 26–34)
MCHC RBC AUTO-ENTMCNC: 33.9 G/DL (ref 31–36)
MCV RBC AUTO: 93.9 FL (ref 80–100)
MONOCYTES # BLD: 0.6 K/UL (ref 0–1.3)
MONOCYTES NFR BLD: 14 %
NEUTROPHILS # BLD: 2.2 K/UL (ref 1.7–7.7)
NEUTROPHILS NFR BLD: 47.3 %
PHOSPHATE SERPL-MCNC: 2.8 MG/DL (ref 2.5–4.9)
PLATELET # BLD AUTO: 170 K/UL (ref 135–450)
PMV BLD AUTO: 7.8 FL (ref 5–10.5)
POTASSIUM SERPL-SCNC: 3.8 MMOL/L (ref 3.5–5.1)
PROT SERPL-MCNC: 5.7 G/DL (ref 6.4–8.2)
RBC # BLD AUTO: 3.63 M/UL (ref 4–5.2)
SODIUM SERPL-SCNC: 142 MMOL/L (ref 136–145)
WBC # BLD AUTO: 4.6 K/UL (ref 4–11)

## 2025-08-30 PROCEDURE — 6370000000 HC RX 637 (ALT 250 FOR IP): Performed by: INTERNAL MEDICINE

## 2025-08-30 PROCEDURE — 2500000003 HC RX 250 WO HCPCS: Performed by: INTERNAL MEDICINE

## 2025-08-30 PROCEDURE — 85025 COMPLETE CBC W/AUTO DIFF WBC: CPT

## 2025-08-30 PROCEDURE — 97162 PT EVAL MOD COMPLEX 30 MIN: CPT

## 2025-08-30 PROCEDURE — 80053 COMPREHEN METABOLIC PANEL: CPT

## 2025-08-30 PROCEDURE — G0378 HOSPITAL OBSERVATION PER HR: HCPCS

## 2025-08-30 PROCEDURE — 94760 N-INVAS EAR/PLS OXIMETRY 1: CPT

## 2025-08-30 PROCEDURE — 97116 GAIT TRAINING THERAPY: CPT

## 2025-08-30 PROCEDURE — 96372 THER/PROPH/DIAG INJ SC/IM: CPT

## 2025-08-30 PROCEDURE — 94640 AIRWAY INHALATION TREATMENT: CPT

## 2025-08-30 PROCEDURE — 84100 ASSAY OF PHOSPHORUS: CPT

## 2025-08-30 PROCEDURE — 36415 COLL VENOUS BLD VENIPUNCTURE: CPT

## 2025-08-30 PROCEDURE — 6360000002 HC RX W HCPCS: Performed by: INTERNAL MEDICINE

## 2025-08-30 RX ADMIN — CLONAZEPAM 0.5 MG: 0.5 TABLET ORAL at 14:31

## 2025-08-30 RX ADMIN — ONDANSETRON 4 MG: 4 TABLET, ORALLY DISINTEGRATING ORAL at 08:54

## 2025-08-30 RX ADMIN — PANTOPRAZOLE SODIUM 40 MG: 40 TABLET, DELAYED RELEASE ORAL at 09:46

## 2025-08-30 RX ADMIN — SODIUM CHLORIDE, PRESERVATIVE FREE 10 ML: 5 INJECTION INTRAVENOUS at 09:49

## 2025-08-30 RX ADMIN — GABAPENTIN 100 MG: 100 CAPSULE ORAL at 09:46

## 2025-08-30 RX ADMIN — GABAPENTIN 100 MG: 100 CAPSULE ORAL at 20:18

## 2025-08-30 RX ADMIN — SERTRALINE 50 MG: 50 TABLET, FILM COATED ORAL at 09:46

## 2025-08-30 RX ADMIN — FUROSEMIDE 20 MG: 20 TABLET ORAL at 09:47

## 2025-08-30 RX ADMIN — SODIUM CHLORIDE, PRESERVATIVE FREE 10 ML: 5 INJECTION INTRAVENOUS at 20:20

## 2025-08-30 RX ADMIN — ACETAMINOPHEN 650 MG: 325 TABLET ORAL at 18:14

## 2025-08-30 RX ADMIN — ACETAMINOPHEN 650 MG: 325 TABLET ORAL at 09:46

## 2025-08-30 RX ADMIN — ACETAMINOPHEN 650 MG: 325 TABLET ORAL at 02:57

## 2025-08-30 RX ADMIN — BUDESONIDE AND FORMOTEROL FUMARATE DIHYDRATE 2 PUFF: 80; 4.5 AEROSOL RESPIRATORY (INHALATION) at 07:23

## 2025-08-30 RX ADMIN — BUDESONIDE AND FORMOTEROL FUMARATE DIHYDRATE 2 PUFF: 80; 4.5 AEROSOL RESPIRATORY (INHALATION) at 19:08

## 2025-08-30 RX ADMIN — ALBUTEROL SULFATE 2 PUFF: 90 AEROSOL, METERED RESPIRATORY (INHALATION) at 07:23

## 2025-08-30 RX ADMIN — ONDANSETRON 4 MG: 4 TABLET, ORALLY DISINTEGRATING ORAL at 18:14

## 2025-08-30 RX ADMIN — ENOXAPARIN SODIUM 40 MG: 100 INJECTION SUBCUTANEOUS at 09:46

## 2025-08-30 RX ADMIN — ALBUTEROL SULFATE 2 PUFF: 90 AEROSOL, METERED RESPIRATORY (INHALATION) at 19:08

## 2025-08-30 ASSESSMENT — PAIN DESCRIPTION - DESCRIPTORS
DESCRIPTORS: ACHING

## 2025-08-30 ASSESSMENT — PAIN DESCRIPTION - LOCATION
LOCATION: HEAD

## 2025-08-30 ASSESSMENT — PAIN SCALES - GENERAL
PAINLEVEL_OUTOF10: 0
PAINLEVEL_OUTOF10: 5
PAINLEVEL_OUTOF10: 4
PAINLEVEL_OUTOF10: 0
PAINLEVEL_OUTOF10: 5
PAINLEVEL_OUTOF10: 3
PAINLEVEL_OUTOF10: 2
PAINLEVEL_OUTOF10: 0

## 2025-08-30 ASSESSMENT — PAIN - FUNCTIONAL ASSESSMENT
PAIN_FUNCTIONAL_ASSESSMENT: 0-10
PAIN_FUNCTIONAL_ASSESSMENT: PREVENTS OR INTERFERES SOME ACTIVE ACTIVITIES AND ADLS
PAIN_FUNCTIONAL_ASSESSMENT: 0-10
PAIN_FUNCTIONAL_ASSESSMENT: 0-10
PAIN_FUNCTIONAL_ASSESSMENT: ACTIVITIES ARE NOT PREVENTED

## 2025-08-30 ASSESSMENT — PAIN DESCRIPTION - ORIENTATION
ORIENTATION: POSTERIOR

## 2025-08-31 VITALS
OXYGEN SATURATION: 92 % | BODY MASS INDEX: 24.35 KG/M2 | SYSTOLIC BLOOD PRESSURE: 164 MMHG | HEART RATE: 70 BPM | DIASTOLIC BLOOD PRESSURE: 83 MMHG | TEMPERATURE: 97.9 F | WEIGHT: 142.64 LBS | HEIGHT: 64 IN | RESPIRATION RATE: 20 BRPM

## 2025-08-31 PROCEDURE — 6360000002 HC RX W HCPCS: Performed by: INTERNAL MEDICINE

## 2025-08-31 PROCEDURE — 6370000000 HC RX 637 (ALT 250 FOR IP): Performed by: INTERNAL MEDICINE

## 2025-08-31 PROCEDURE — 94760 N-INVAS EAR/PLS OXIMETRY 1: CPT

## 2025-08-31 PROCEDURE — 2500000003 HC RX 250 WO HCPCS: Performed by: INTERNAL MEDICINE

## 2025-08-31 PROCEDURE — 96372 THER/PROPH/DIAG INJ SC/IM: CPT

## 2025-08-31 PROCEDURE — 2060000000 HC ICU INTERMEDIATE R&B

## 2025-08-31 PROCEDURE — 94640 AIRWAY INHALATION TREATMENT: CPT

## 2025-08-31 RX ADMIN — ALBUTEROL SULFATE 2 PUFF: 90 AEROSOL, METERED RESPIRATORY (INHALATION) at 07:43

## 2025-08-31 RX ADMIN — PANTOPRAZOLE SODIUM 40 MG: 40 TABLET, DELAYED RELEASE ORAL at 09:42

## 2025-08-31 RX ADMIN — FUROSEMIDE 20 MG: 20 TABLET ORAL at 09:42

## 2025-08-31 RX ADMIN — BUDESONIDE AND FORMOTEROL FUMARATE DIHYDRATE 2 PUFF: 80; 4.5 AEROSOL RESPIRATORY (INHALATION) at 07:43

## 2025-08-31 RX ADMIN — SERTRALINE 50 MG: 50 TABLET, FILM COATED ORAL at 09:42

## 2025-08-31 RX ADMIN — ACETAMINOPHEN 650 MG: 325 TABLET ORAL at 00:32

## 2025-08-31 RX ADMIN — SODIUM CHLORIDE, PRESERVATIVE FREE 10 ML: 5 INJECTION INTRAVENOUS at 09:43

## 2025-08-31 RX ADMIN — GABAPENTIN 100 MG: 100 CAPSULE ORAL at 09:42

## 2025-08-31 RX ADMIN — ACETAMINOPHEN 650 MG: 325 TABLET ORAL at 09:42

## 2025-08-31 RX ADMIN — ENOXAPARIN SODIUM 40 MG: 100 INJECTION SUBCUTANEOUS at 09:42

## 2025-08-31 RX ADMIN — CLONAZEPAM 0.5 MG: 0.5 TABLET ORAL at 09:42

## 2025-08-31 ASSESSMENT — PAIN DESCRIPTION - ORIENTATION: ORIENTATION: RIGHT;LEFT;MID

## 2025-08-31 ASSESSMENT — PAIN SCALES - GENERAL
PAINLEVEL_OUTOF10: 4
PAINLEVEL_OUTOF10: 1
PAINLEVEL_OUTOF10: 3
PAINLEVEL_OUTOF10: 2

## 2025-08-31 ASSESSMENT — PAIN - FUNCTIONAL ASSESSMENT
PAIN_FUNCTIONAL_ASSESSMENT: 0-10

## 2025-08-31 ASSESSMENT — PAIN DESCRIPTION - DESCRIPTORS: DESCRIPTORS: ACHING

## 2025-08-31 ASSESSMENT — PAIN DESCRIPTION - LOCATION
LOCATION: HEAD
LOCATION: HEAD;LEG
LOCATION: HEAD

## (undated) DEVICE — CANISTER, RIGID, 1200CC: Brand: MEDLINE INDUSTRIES, INC.

## (undated) DEVICE — GLOVE SURG SZ 6 THK91MIL LTX FREE SYN POLYISOPRENE ANTI

## (undated) DEVICE — GOWN SIRUS NONREIN XL W/TWL: Brand: MEDLINE INDUSTRIES, INC.

## (undated) DEVICE — BANDAGE COMPR W6INXL12FT SMOOTH FOR LIMB EXSANG ESMARCH

## (undated) DEVICE — MERCY HEALTH WEST TURNOVER: Brand: MEDLINE INDUSTRIES, INC.

## (undated) DEVICE — CHLORAPREP 26ML ORANGE

## (undated) DEVICE — SHEET,DRAPE,53X77,STERILE: Brand: MEDLINE

## (undated) DEVICE — DRAPE,U/SHT,SPLIT,FILM,60X84,STERILE: Brand: MEDLINE

## (undated) DEVICE — FORCEPS BX 240CM 2.4MM L NDL RAD JAW 4 M00513334

## (undated) DEVICE — T-DRAPE,EXTREMITY,STERILE: Brand: MEDLINE

## (undated) DEVICE — GOWN SIRUS NONREIN LG W/TWL: Brand: MEDLINE INDUSTRIES, INC.

## (undated) DEVICE — ELECTRODE PT RET AD L9FT HI MOIST COND ADH HYDRGEL CORDED

## (undated) DEVICE — GLOVE SURG SZ 6 CRM LTX FREE POLYISOPRENE POLYMER BEAD ANTI

## (undated) DEVICE — SPONGE GZ W4XL4IN COT 12 PLY TYP VII WVN C FLD DSGN

## (undated) DEVICE — PAD,ABDOMINAL,8"X7.5",STERILE,LF,1/PK: Brand: MEDLINE

## (undated) DEVICE — CONTAINER SPEC 480ML CLR POLYSTYR 10% NEUT BUFF FRMLN ZN

## (undated) DEVICE — COTTON UNDERCAST PADDING,CRIMPED FINISH: Brand: WEBRIL

## (undated) DEVICE — NEEDLE HYPO 23GA L1.5IN TURQ POLYPR HUB S STL THN WALL IM

## (undated) DEVICE — STOCKINETTE,IMPERVIOUS,12X48,STERILE: Brand: MEDLINE

## (undated) DEVICE — ENDOSCOPY KIT: Brand: MEDLINE INDUSTRIES, INC.

## (undated) DEVICE — COVER LT HNDL BLU PLAS

## (undated) DEVICE — MAJOR SET UP PK

## (undated) DEVICE — GLOVE SURG SZ 8 CRM LTX FREE POLYISOPRENE POLYMER BEAD ANTI

## (undated) DEVICE — Z DISCONTINUED USE 2275686 GLOVE SURG SZ 8 L12IN FNGR THK13MIL WHT ISOLEX POLYISOPRENE

## (undated) DEVICE — SPONGE LAP W18XL18IN WHT COT 4 PLY FLD STRUNG RADPQ DISP ST

## (undated) DEVICE — 3M™ COBAN™ NL STERILE NON-LATEX SELF-ADHERENT WRAP, 2086S, 6 IN X 5 YD (15 CM X 4,5 M), 12 ROLLS/CASE: Brand: 3M™ COBAN™

## (undated) DEVICE — GLOVE SURG SZ 6 L12IN FNGR THK87MIL DK GRN LTX FREE ISOLEX

## (undated) DEVICE — BITE BLOCK ENDOSCP AD 60 FR W/ ADJ STRP PLAS GRN BLOX

## (undated) DEVICE — SOLUTION IV IRRIG POUR BRL 0.9% SODIUM CHL 2F7124

## (undated) DEVICE — BANDAGE COMPR W6INXL10YD ST M E WHITE/BEIGE

## (undated) DEVICE — SYRINGE MED 10ML LUERLOCK TIP W/O SFTY DISP